# Patient Record
Sex: FEMALE | Race: WHITE | NOT HISPANIC OR LATINO | Employment: FULL TIME | URBAN - METROPOLITAN AREA
[De-identification: names, ages, dates, MRNs, and addresses within clinical notes are randomized per-mention and may not be internally consistent; named-entity substitution may affect disease eponyms.]

---

## 2017-05-30 ENCOUNTER — TELEPHONE (OUTPATIENT)
Dept: INTERNAL MEDICINE | Facility: CLINIC | Age: 32
End: 2017-05-30

## 2017-05-30 NOTE — TELEPHONE ENCOUNTER
----- Message from Melody Aviles MA sent at 5/29/2017  9:57 AM CDT -----  Regarding: appointment needed  Christie Montes asked me to help get her an appointment for her annual physical. Please call her at [phone] to schedule an appointment.     Thanks,  Melody Aviles MA  [facility]

## 2017-06-13 ENCOUNTER — TELEPHONE (OUTPATIENT)
Dept: INTERNAL MEDICINE | Facility: CLINIC | Age: 32
End: 2017-06-13

## 2017-06-13 NOTE — TELEPHONE ENCOUNTER
Left message for patient on the only phone number on file to call office.  Patient has to R/S appt on 6-14-17 due to Dr. Berman being out of the office.

## 2017-06-20 ENCOUNTER — OFFICE VISIT (OUTPATIENT)
Dept: OPTOMETRY | Facility: CLINIC | Age: 32
End: 2017-06-20
Payer: COMMERCIAL

## 2017-06-20 ENCOUNTER — TELEPHONE (OUTPATIENT)
Dept: INTERNAL MEDICINE | Facility: CLINIC | Age: 32
End: 2017-06-20

## 2017-06-20 DIAGNOSIS — Z46.0 FITTING AND ADJUSTMENT OF SPECTACLES AND CONTACT LENSES: Primary | ICD-10-CM

## 2017-06-20 DIAGNOSIS — H31.003 CHORIORETINAL SCAR, BILATERAL: ICD-10-CM

## 2017-06-20 DIAGNOSIS — H52.13 MYOPIA, BILATERAL: Primary | ICD-10-CM

## 2017-06-20 PROCEDURE — 99999 PR PBB SHADOW E&M-EST. PATIENT-LVL II: CPT | Mod: PBBFAC,,, | Performed by: OPTOMETRIST

## 2017-06-20 PROCEDURE — 92015 DETERMINE REFRACTIVE STATE: CPT | Mod: S$GLB,,, | Performed by: OPTOMETRIST

## 2017-06-20 PROCEDURE — 92014 COMPRE OPH EXAM EST PT 1/>: CPT | Mod: S$GLB,,, | Performed by: OPTOMETRIST

## 2017-06-20 PROCEDURE — 92310 CONTACT LENS FITTING OU: CPT | Mod: ,,, | Performed by: OPTOMETRIST

## 2017-06-20 NOTE — PROGRESS NOTES
HPI     Patient's age: 32 y.o.    Approximate date of last eye examination:  9/18/14  Name of last eye doctor seen: Dr. Esparza    Pt states that she is here for routine exam and contacts, not having any   trouble with eyes or contacts.    Wears glasses? Yes, left at home     Wears CLs?:  yes           If yes:              Type of CL worn:  [employer]              Wears full-time or part-time:  Full-time               Sleeps with contact lenses:  no                Headaches?  no  Eye pain/discomfort?  no                                                                                     Flashes?  no  Floaters?  no  Diplopia/Double vision?  no    Patient's Ocular History:         Any eye surgeries? no         Any eye injury?  no         Any treatment for eye disease?  no  Family history of eye disease?  no    Significant patient medical history:         1. Diabetes?  no       If yes, IDDM or NIDDM? no   2. HBP?  no                ! OTC eyedrops currently using:  none   ! Prescription eye meds currently using:  none        Last edited by Katrina Dawn MA on 6/20/2017  8:29 AM. (History)            Assessment /Plan     For exam results, see Encounter Report.    Myopia, bilateral    Chorioretinal scar, bilateral      Rx specs  CL fit today: good fit, vision and comfort OU   Changed Power OU   OK to order if happy     Discussed RD precautions and discussed yearly DFE to monitor    RTC 1 year

## 2017-06-20 NOTE — TELEPHONE ENCOUNTER
----- Message from Lisa Sullivan sent at 6/14/2017  7:49 AM CDT -----  Contact: Self/[phone] cell   Pt was returning a call to reschedule her appointment I offered pt an appointment for the next available she stated that date is too far out and she has already waited 3 weeks to be seen pt would like to know if she can be seen sooner. Please call and advise            Thank you

## 2017-07-06 ENCOUNTER — OFFICE VISIT (OUTPATIENT)
Dept: INTERNAL MEDICINE | Facility: CLINIC | Age: 32
End: 2017-07-06
Attending: FAMILY MEDICINE
Payer: COMMERCIAL

## 2017-07-06 ENCOUNTER — LAB VISIT (OUTPATIENT)
Dept: LAB | Facility: HOSPITAL | Age: 32
End: 2017-07-06
Attending: FAMILY MEDICINE
Payer: COMMERCIAL

## 2017-07-06 VITALS
OXYGEN SATURATION: 99 % | DIASTOLIC BLOOD PRESSURE: 60 MMHG | SYSTOLIC BLOOD PRESSURE: 90 MMHG | HEIGHT: 66 IN | HEART RATE: 67 BPM | WEIGHT: 161.19 LBS | BODY MASS INDEX: 25.9 KG/M2

## 2017-07-06 DIAGNOSIS — Z00.00 ANNUAL PHYSICAL EXAM: Primary | ICD-10-CM

## 2017-07-06 DIAGNOSIS — Z00.00 ANNUAL PHYSICAL EXAM: ICD-10-CM

## 2017-07-06 DIAGNOSIS — E01.0 THYROMEGALY: ICD-10-CM

## 2017-07-06 LAB
ANION GAP SERPL CALC-SCNC: 6 MMOL/L
BUN SERPL-MCNC: 8 MG/DL
CALCIUM SERPL-MCNC: 9.2 MG/DL
CHLORIDE SERPL-SCNC: 105 MMOL/L
CHOLEST/HDLC SERPL: 3.4 {RATIO}
CO2 SERPL-SCNC: 29 MMOL/L
CREAT SERPL-MCNC: 0.8 MG/DL
EST. GFR  (AFRICAN AMERICAN): >60 ML/MIN/1.73 M^2
EST. GFR  (NON AFRICAN AMERICAN): >60 ML/MIN/1.73 M^2
GLUCOSE SERPL-MCNC: 90 MG/DL
HDL/CHOLESTEROL RATIO: 29.7 %
HDLC SERPL-MCNC: 209 MG/DL
HDLC SERPL-MCNC: 62 MG/DL
LDLC SERPL CALC-MCNC: 137 MG/DL
NONHDLC SERPL-MCNC: 147 MG/DL
POTASSIUM SERPL-SCNC: 4.3 MMOL/L
SODIUM SERPL-SCNC: 140 MMOL/L
TRIGL SERPL-MCNC: 50 MG/DL

## 2017-07-06 PROCEDURE — 36415 COLL VENOUS BLD VENIPUNCTURE: CPT

## 2017-07-06 PROCEDURE — 99395 PREV VISIT EST AGE 18-39: CPT | Mod: S$GLB,,, | Performed by: FAMILY MEDICINE

## 2017-07-06 PROCEDURE — 99999 PR PBB SHADOW E&M-EST. PATIENT-LVL III: CPT | Mod: PBBFAC,,, | Performed by: FAMILY MEDICINE

## 2017-07-06 PROCEDURE — 80048 BASIC METABOLIC PNL TOTAL CA: CPT

## 2017-07-06 PROCEDURE — 80061 LIPID PANEL: CPT

## 2017-07-06 NOTE — PROGRESS NOTES
Subjective:       Patient ID: Christie Bay is a 32 y.o. female.    Chief Complaint: Annual Exam    Established patient for an annual wellness check/physical exam. No specific complaints, please see ROS.        Review of Systems   Constitutional: Negative for activity change, chills, fatigue, fever and unexpected weight change.   HENT: Negative for congestion, hearing loss, rhinorrhea and trouble swallowing.    Eyes: Negative for discharge, redness and visual disturbance.   Respiratory: Negative for cough, chest tightness, shortness of breath and wheezing.    Cardiovascular: Negative for chest pain, palpitations and leg swelling.   Gastrointestinal: Negative for abdominal pain, blood in stool, constipation, diarrhea and vomiting.   Endocrine: Negative for polydipsia and polyuria.   Genitourinary: Negative for difficulty urinating, dysuria, hematuria and menstrual problem.   Musculoskeletal: Negative for arthralgias, back pain, gait problem, joint swelling, myalgias and neck pain.   Skin: Negative for color change and rash.   Neurological: Positive for headaches. Negative for tremors, speech difficulty, weakness and numbness.   Hematological: Negative for adenopathy. Does not bruise/bleed easily.   Psychiatric/Behavioral: Negative for behavioral problems, confusion, dysphoric mood and sleep disturbance. The patient is not nervous/anxious.        Objective:      Physical Exam   Constitutional: She is oriented to person, place, and time. She appears well-developed and well-nourished.   Eyes: No scleral icterus.   Neck: Normal range of motion. Neck supple. No JVD present. No tracheal deviation present. No thyromegaly present.   Cardiovascular: Normal rate, normal heart sounds and intact distal pulses.  Exam reveals no gallop and no friction rub.    No murmur heard.  Pulmonary/Chest: Effort normal and breath sounds normal. No respiratory distress. She has no wheezes. She has no rales.   Abdominal: Soft.  Bowel sounds are normal. She exhibits no distension, no abdominal bruit and no mass. There is no tenderness. There is no rebound and no guarding.   Musculoskeletal: She exhibits no edema.   Lymphadenopathy:     She has no cervical adenopathy.   Neurological: She is alert and oriented to person, place, and time. She displays no tremor. No cranial nerve deficit. Coordination and gait normal.   Skin: Skin is warm and dry. No rash noted. She is not diaphoretic. No erythema.   Psychiatric: She has a normal mood and affect. Her behavior is normal. Judgment and thought content normal.   Nursing note and vitals reviewed.      Assessment:       1. Annual physical exam    2. Thyromegaly        Plan:   Christie was seen today for annual exam.    Diagnoses and all orders for this visit:    Annual physical exam  -     Basic metabolic panel; Future  -     Lipid panel; Future    Thyromegaly  -     US Soft Tissue Head Neck Thyroid; Future      See meds, orders, follow up, routing and instructions sections of encounter.  Old labs reviewed.

## 2017-07-07 ENCOUNTER — OFFICE VISIT (OUTPATIENT)
Dept: OBSTETRICS AND GYNECOLOGY | Facility: CLINIC | Age: 32
End: 2017-07-07
Payer: COMMERCIAL

## 2017-07-07 VITALS
SYSTOLIC BLOOD PRESSURE: 106 MMHG | HEIGHT: 66 IN | WEIGHT: 160.25 LBS | BODY MASS INDEX: 25.75 KG/M2 | DIASTOLIC BLOOD PRESSURE: 74 MMHG

## 2017-07-07 DIAGNOSIS — Z01.419 ENCOUNTER FOR GYNECOLOGICAL EXAMINATION WITHOUT ABNORMAL FINDING: Primary | ICD-10-CM

## 2017-07-07 DIAGNOSIS — Z31.9 PROCREATIVE MANAGEMENT: ICD-10-CM

## 2017-07-07 PROCEDURE — 88175 CYTOPATH C/V AUTO FLUID REDO: CPT

## 2017-07-07 PROCEDURE — 99999 PR PBB SHADOW E&M-EST. PATIENT-LVL III: CPT | Mod: PBBFAC,,, | Performed by: OBSTETRICS & GYNECOLOGY

## 2017-07-07 PROCEDURE — 99385 PREV VISIT NEW AGE 18-39: CPT | Mod: S$GLB,,, | Performed by: OBSTETRICS & GYNECOLOGY

## 2017-07-07 NOTE — PROGRESS NOTES
"CC: Well woman exam    Christie Bay is a 32 y.o. female  presents for a well woman exam.  She has no issues, problems, or complaints.    Past Medical History:   Diagnosis Date    History of shingles     Migraine headache     Ulcer        Past Surgical History:   Procedure Laterality Date    CHOLECYSTECTOMY      KNEE SURGERY Left     SHOULDER SURGERY Right        OB History    Para Term  AB Living   0 0 0 0 0 0   SAB TAB Ectopic Multiple Live Births   0 0 0 0               Family History   Problem Relation Age of Onset    Migraines Mother     Skin cancer Mother     Cancer Mother      skin    Hypertension Father     Diabetes Maternal Grandmother     Breast cancer Maternal Grandmother     Cancer Maternal Grandmother      breast    Brain cancer Paternal Grandfather     Cancer Paternal Grandfather      brain    Ovarian cancer Neg Hx     Colon cancer Neg Hx        Social History   Substance Use Topics    Smoking status: Never Smoker    Smokeless tobacco: Never Used    Alcohol use Yes      Comment: 2 per month.       /74   Ht 5' 6" (1.676 m)   Wt 72.7 kg (160 lb 4.4 oz)   LMP 2017 (Exact Date)   BMI 25.87 kg/m²     ROS:  GENERAL: Denies weight gain or weight loss. Feeling well overall.   SKIN: Denies rash or lesions.   HEAD: Denies head injury or headache.   NODES: Denies enlarged lymph nodes.   CHEST: Denies chest pain or shortness of breath.   CARDIOVASCULAR: Denies palpitations or left sided chest pain.   ABDOMEN: No abdominal pain, constipation, diarrhea, nausea, vomiting or rectal bleeding.   URINARY: No frequency, dysuria, hematuria, or burning on urination.  REPRODUCTIVE: See HPI.   BREASTS: The patient performs breast self-examination and denies pain, lumps, or nipple discharge.   HEMATOLOGIC: No easy bruisability or excessive bleeding.  MUSCULOSKELETAL: Denies joint pain or swelling.   NEUROLOGIC: Denies syncope or weakness.   PSYCHIATRIC: " Denies depression, anxiety or mood swings.    Physical Exam:    APPEARANCE: Well nourished, well developed, in no acute distress.  AFFECT: WNL, alert and oriented x 3  SKIN: No acne or hirsutism  NECK: Neck symmetric without masses or thyromegaly  NODES: No inguinal, cervical, axillary, or femoral lymph node enlargement  CHEST: Good respiratory effect  ABDOMEN: Soft.  No tenderness or masses.  No hepatosplenomegaly.  No hernias.  BREASTS: Symmetrical, no skin changes or visible lesions.  No palpable masses, nipple discharge bilaterally.  PELVIC: Normal external genitalia without lesions.  Normal hair distribution.  Adequate perineal body, normal urethral meatus.  Vagina moist and well rugated without lesions or discharge.  Cervix pink, without lesions, discharge or tenderness.  No significant cystocele or rectocele.  Bimanual exam shows uterus to be normal size, regular, mobile and nontender.  Adnexa without masses or tenderness.    EXTREMITIES: No edema.    ASSESSMENT AND PLAN  1. Encounter for gynecological examination without abnormal finding  Liquid-based pap smear, screening   2. Procreative management       PNV daily  Considering future fertility with Dr Manning  Patient was counseled today on A.C.S. Pap guidelines and recommendations for yearly pelvic exams, mammograms and monthly self breast exams; to see her PCP for other health maintenance.     F/U PRN

## 2017-08-01 ENCOUNTER — OFFICE VISIT (OUTPATIENT)
Dept: DERMATOLOGY | Facility: CLINIC | Age: 32
End: 2017-08-01
Payer: COMMERCIAL

## 2017-08-01 DIAGNOSIS — L81.4 LENTIGINES: ICD-10-CM

## 2017-08-01 DIAGNOSIS — D22.9 BENIGN NEVUS: ICD-10-CM

## 2017-08-01 DIAGNOSIS — L70.0 ACNE VULGARIS: ICD-10-CM

## 2017-08-01 DIAGNOSIS — D18.00 ANGIOMA: Primary | ICD-10-CM

## 2017-08-01 PROCEDURE — 99999 PR PBB SHADOW E&M-EST. PATIENT-LVL II: CPT | Mod: PBBFAC,,, | Performed by: DERMATOLOGY

## 2017-08-01 PROCEDURE — 99202 OFFICE O/P NEW SF 15 MIN: CPT | Mod: S$GLB,,, | Performed by: DERMATOLOGY

## 2017-08-01 NOTE — PROGRESS NOTES
"  Subjective:       Patient ID:  Christie Bay is a 32 y.o. female who presents for   Chief Complaint   Patient presents with    Skin Check     UBSE, no concerns     Ms. Bay is a 33 yo woman here as new patient for UBSE. She has never had a skin cancer or had any moles removed. She says her mom had a "pre-cancer" on her nose that was treated and resolved. Denies any dark/changing/painful/bleeding lesions. She is an  and spends a lot of time outdoors. She got a sunburn on her shoulders last week because she forgot to re-apply sunscreen.       Past Medical History:   Diagnosis Date    History of shingles     Migraine headache     Ulcer        Review of Systems   Constitutional: Negative for fever, chills, fatigue and malaise.   Skin: Positive for activity-related sunscreen use and recent sunburn. Negative for daily sunscreen use.   Hematologic/Lymphatic: Bruises/bleeds easily.        Objective:    Physical Exam   Constitutional: She appears well-developed and well-nourished. No distress.   Neurological: She is alert and oriented to person, place, and time. She is not disoriented.   Psychiatric: She has a normal mood and affect.   Skin:   Areas Examined (abnormalities noted in diagram):   Head / Face Inspection Performed  Neck Inspection Performed  Chest / Axilla Inspection Performed  Abdomen Inspection Performed  Back Inspection Performed  RUE Inspected  LUE Inspection Performed  RLE Inspected  LLE Inspection Performed              Diagram Legend     Erythematous scaling macule/papule c/w actinic keratosis       Vascular papule c/w angioma      Pigmented verrucoid papule/plaque c/w seborrheic keratosis      Yellow umbilicated papule c/w sebaceous hyperplasia      Irregularly shaped tan macule c/w lentigo     1-2 mm smooth white papules consistent with Milia      Movable subcutaneous cyst with punctum c/w epidermal inclusion cyst      Subcutaneous movable cyst c/w pilar cyst      " Firm pink to brown papule c/w dermatofibroma      Pedunculated fleshy papule(s) c/w skin tag(s)      Evenly pigmented macule c/w junctional nevus     Mildly variegated pigmented, slightly irregular-bordered macule c/w mildly atypical nevus      Flesh colored to evenly pigmented papule c/w intradermal nevus       Pink pearly papule/plaque c/w basal cell carcinoma      Erythematous hyperkeratotic cursted plaque c/w SCC      Surgical scar with no sign of skin cancer recurrence      Open and closed comedones      Inflammatory papules and pustules      Verrucoid papule consistent consistent with wart     Erythematous eczematous patches and plaques     Dystrophic onycholytic nail with subungual debris c/w onychomycosis     Umbilicated papule    Erythematous-base heme-crusted tan verrucoid plaque consistent with inflamed seborrheic keratosis     Erythematous Silvery Scaling Plaque c/w Psoriasis     See annotation      Assessment / Plan:        1) Solar lentigines:  - Noted on bilateral shoulders.  - Discussed that these are a marker of chronic sun exposure.  - Advised judicious sun protection with re-application of sunscreen every 2 hours.     2) Benign Nevus  Reassurance    3) Angioma  This is a benign vascular lesion. Reassurance given. No treatment required.     4) Acne  Asymptomatic.      RTC annually for TBSE

## 2017-08-01 NOTE — LETTER
August 1, 2017      Gera Berman MD  [address]  [place] [address]           [facility]  [address]  [place] [address]  Phone: [phone]  Fax: [phone]          Patient: Christie Bay   MR Number: [id number]   YOB: 1985   Date of Visit: 8/1/2017       Dear Dr. Gera Berman:    Thank you for referring Christie Bay to me for evaluation. Attached you will find relevant portions of my assessment and plan of care.    If you have questions, please do not hesitate to call me. I look forward to following Christie Bay along with you.    Sincerely,    Paradise Donovan MD    Enclosure  CC:  No Recipients    If you would like to receive this communication electronically, please contact [email or web address] or [phone] to request more information on [employer] access.    For providers and/or their staff who would like to refer a patient to [facility], please contact us through our one-stop-shop provider referral line, [facility], at [phone].    If you feel you have received this communication in error or would no longer like to receive these types of communications, please e-mail [email or web address]

## 2018-04-26 ENCOUNTER — OFFICE VISIT (OUTPATIENT)
Dept: URGENT CARE | Facility: CLINIC | Age: 33
End: 2018-04-26
Payer: COMMERCIAL

## 2018-04-26 VITALS
DIASTOLIC BLOOD PRESSURE: 78 MMHG | WEIGHT: 147 LBS | HEIGHT: 66 IN | HEART RATE: 65 BPM | OXYGEN SATURATION: 100 % | SYSTOLIC BLOOD PRESSURE: 125 MMHG | RESPIRATION RATE: 18 BRPM | TEMPERATURE: 99 F | BODY MASS INDEX: 23.63 KG/M2

## 2018-04-26 DIAGNOSIS — J06.9 URI WITH COUGH AND CONGESTION: Primary | ICD-10-CM

## 2018-04-26 DIAGNOSIS — J02.9 SORE THROAT: ICD-10-CM

## 2018-04-26 LAB
CTP QC/QA: YES
CTP QC/QA: YES
FLUAV AG NPH QL: NEGATIVE
FLUBV AG NPH QL: NEGATIVE
S PYO RRNA THROAT QL PROBE: NEGATIVE

## 2018-04-26 PROCEDURE — 87804 INFLUENZA ASSAY W/OPTIC: CPT | Mod: QW,S$GLB,, | Performed by: NURSE PRACTITIONER

## 2018-04-26 PROCEDURE — 87880 STREP A ASSAY W/OPTIC: CPT | Mod: QW,S$GLB,, | Performed by: NURSE PRACTITIONER

## 2018-04-26 PROCEDURE — 96372 THER/PROPH/DIAG INJ SC/IM: CPT | Mod: S$GLB,,, | Performed by: EMERGENCY MEDICINE

## 2018-04-26 PROCEDURE — 99214 OFFICE O/P EST MOD 30 MIN: CPT | Mod: 25,S$GLB,, | Performed by: NURSE PRACTITIONER

## 2018-04-26 RX ORDER — AZITHROMYCIN 250 MG/1
TABLET, FILM COATED ORAL
Qty: 6 TABLET | Refills: 0 | Status: SHIPPED | OUTPATIENT
Start: 2018-04-26 | End: 2019-07-31 | Stop reason: ALTCHOICE

## 2018-04-26 RX ORDER — BETAMETHASONE SODIUM PHOSPHATE AND BETAMETHASONE ACETATE 3; 3 MG/ML; MG/ML
6 INJECTION, SUSPENSION INTRA-ARTICULAR; INTRALESIONAL; INTRAMUSCULAR; SOFT TISSUE
Status: COMPLETED | OUTPATIENT
Start: 2018-04-26 | End: 2018-04-26

## 2018-04-26 RX ORDER — PROMETHAZINE HYDROCHLORIDE AND DEXTROMETHORPHAN HYDROBROMIDE 6.25; 15 MG/5ML; MG/5ML
5 SYRUP ORAL NIGHTLY PRN
Qty: 90 ML | Refills: 0 | Status: SHIPPED | OUTPATIENT
Start: 2018-04-26 | End: 2018-05-06

## 2018-04-26 RX ORDER — FLUTICASONE PROPIONATE 50 MCG
2 SPRAY, SUSPENSION (ML) NASAL DAILY
Qty: 1 BOTTLE | Refills: 0 | Status: SHIPPED | OUTPATIENT
Start: 2018-04-26 | End: 2019-07-31 | Stop reason: ALTCHOICE

## 2018-04-26 RX ADMIN — BETAMETHASONE SODIUM PHOSPHATE AND BETAMETHASONE ACETATE 6 MG: 3; 3 INJECTION, SUSPENSION INTRA-ARTICULAR; INTRALESIONAL; INTRAMUSCULAR; SOFT TISSUE at 01:04

## 2018-04-26 NOTE — PATIENT INSTRUCTIONS
YOU HAVE BEEN GIVEN A PRESCRIPTION FOR ANTIBIOTICS. IT WAS ADVISED TO DELAY THE COURSE OF ANTIBIOTICS FOR 3-5 DAYS IF SYMPTOMS DO NOT RESOLVE. IT IMPORTANT TO FOLLOW THESE INSTRUCTIONS AS ANTIBIOTIC RESISTANCE IS HIGH. YOUR SYMPTOMS WILL LIKELY RESOLVE WITHOUT THIS PRESCRIPTION.         Please drink plenty of fluids.  Please get plenty of rest.  Please return here or go to the Emergency Department for any concerns or worsening of condition.  If you were prescribed antibiotics, please take them to completion.  If you were given wait & see antibiotics, please wait 5-7 days before taking them, and only take them if your symptoms have worsened or not improved.  If you do begin taking the antibiotics, please take them to completion.  If you were prescribed a narcotic medication, do not drive or operate heavy equipment or machinery while taking these medications.  If you were given a steroid shot in the clinic and have also been given a prescription for a steroid such as Prednisone or a Medrol Dose Pack, please begin taking them tomorrow.  If you do not have Hypertension or any history of palpitations, it is ok to take over the counter Sudafed or Mucinex D or Allegra-D or Claritin-D or Zyrtec-D.  If you do take one of the above, it is ok to combine that with plain over the counter Mucinex or Allegra or Claritin or Zyrtec.  If for example you are taking Zyrtec -D, you can combine that with Mucinex, but not Mucinex-D.  If you are taking Mucinex-D, you can combine that with plain Allegra or Claritin or Zyrtec.   If you do have Hypertension or palpitations, it is safe to take Coricidin HBP for relief of sinus symptoms.  If not allergic, please take over the counter Tylenol (Acetaminophen) and/or Motrin (Ibuprofen) as directed for control of pain and/or fever.  Please follow up with your primary care doctor or specialist as needed.    If you  smoke, please stop smoking.      Viral Upper Respiratory Illness (Adult)  You have a  viral upper respiratory illness (URI), which is another term for the common cold. This illness is contagious during the first few days. It is spread through the air by coughing and sneezing. It may also be spread by direct contact (touching the sick person and then touching your own eyes, nose, or mouth). Frequent handwashing will decrease risk of spread. Most viral illnesses go away within 7 to 10 days with rest and simple home remedies. Sometimes the illness may last for several weeks. Antibiotics will not kill a virus, and they are generally not prescribed for this condition.    Home care  · If symptoms are severe, rest at home for the first 2 to 3 days. When you resume activity, don't let yourself get too tired.  · Avoid being exposed to cigarette smoke (yours or others).  · You may use acetaminophen or ibuprofen to control pain and fever, unless another medicine was prescribed. (Note: If you have chronic liver or kidney disease, have ever had a stomach ulcer or gastrointestinal bleeding, or are taking blood-thinning medicines, talk with your healthcare provider before using these medicines.) Aspirin should never be given to anyone under 18 years of age who is ill with a viral infection or fever. It may cause severe liver or brain damage.  · Your appetite may be poor, so a light diet is fine. Avoid dehydration by drinking 6 to 8 glasses of fluids per day (water, soft drinks, juices, tea, or soup). Extra fluids will help loosen secretions in the nose and lungs.  · Over-the-counter cold medicines will not shorten the length of time youre sick, but they may be helpful for the following symptoms: cough, sore throat, and nasal and sinus congestion. (Note: Do not use decongestants if you have high blood pressure.)  Follow-up care  Follow up with your healthcare provider, or as advised.  When to seek medical advice  Call your healthcare provider right away if any of these occur:  · Cough with lots of colored sputum  (mucus)  · Severe headache; face, neck, or ear pain  · Difficulty swallowing due to throat pain  · Fever of 100.4°F (38°C)  Call 911, or get immediate medical care  Call emergency services right away if any of these occur:  · Chest pain, shortness of breath, wheezing, or difficulty breathing  · Coughing up blood  · Inability to swallow due to throat pain  Date Last Reviewed: 9/13/2015  © 6252-9168 [employer]. [address] [place] [address], [place] [address]. All rights reserved. This information is not intended as a substitute for professional medical care. Always follow your healthcare professional's instructions.        Self-Care for Sore Throats    Sore throats happen for many reasons, such as colds, allergies, and infections caused by viruses or bacteria. In any case, your throat becomes red and sore. Your goal for self-care is to reduce your discomfort while giving your throat a chance to heal.  Moisten and soothe your throat  Tips include the following:  · Try a sip of water first thing after waking up.  · Keep your throat moist by drinking 6 or more glasses of clear liquids every day.  · Run a cool-air humidifier in your room overnight.  · Avoid cigarette smoke.   · Suck on throat lozenges, cough drops, hard candy, ice chips, or frozen fruit-juice bars. Use the sugar-free versions if your diet or medical condition requires them.  Gargle to ease irritation  Gargling every hour or 2 can ease irritation. Try gargling with 1 of these solutions:  · 1/4 teaspoon of salt in 1/2 cup of warm water  · An over-the-counter anesthetic gargle  Use medicine for more relief  Over-the-counter medicine can reduce sore throat symptoms. Ask your pharmacist if you have questions about which medicine to use:  · Ease pain with anesthetic sprays. Aspirin or an aspirin substitute also helps. Remember, never give aspirin to anyone 18 or younger, or if you are already taking blood thinners.   · For sore throats caused by  allergies, try antihistamines to block the allergic reaction.  · Remember: unless a sore throat is caused by a bacterial infection, antibiotics wont help you.  Prevent future sore throats  Prevention tips include the following:  · Stop smoking or reduce contact with secondhand smoke. Smoke irritates the tender throat lining.  · Limit contact with pets and with allergy-causing substances, such as pollen and mold.  · When youre around someone with a sore throat or cold, wash your hands often to keep viruses or bacteria from spreading.  · Dont strain your vocal cords.  Call your healthcare provider  Contact your healthcare provider if you have:  · A temperature over 101°F (38.3°C)  · White spots on the throat  · Great difficulty swallowing  · Trouble breathing  · A skin rash  · Recent exposure to someone else with strep bacteria  · Severe hoarseness and swollen glands in the neck or jaw   Date Last Reviewed: 8/1/2016  © 2152-6098 [employer]. [address] [place] [address], [place] [address]. All rights reserved. This information is not intended as a substitute for professional medical care. Always follow your healthcare professional's instructions.

## 2018-04-26 NOTE — PROGRESS NOTES
"Subjective:       Patient ID: Christie Bay is a 33 y.o. female.    Vitals:  height is 5' 6" (1.676 m) and weight is 66.7 kg (147 lb). Her temperature is 98.6 °F (37 °C). Her blood pressure is 125/78 and her pulse is 65. Her respiration is 18 and oxygen saturation is 100%.     Chief Complaint: Sinus Problem    Reports fever os 101.4F the other night.   S/s- URI with cough and congestion. + nasal congestion.           Sinus Problem   This is a new problem. Episode onset: 3 Days. The problem has been gradually worsening since onset. There has been no fever. Her pain is at a severity of 3/10. The pain is mild. Associated symptoms include congestion, coughing, shortness of breath and a sore throat. Pertinent negatives include no chills, ear pain, headaches or hoarse voice. Past treatments include oral decongestants. The treatment provided mild relief.     Review of Systems   Constitution: Positive for malaise/fatigue. Negative for chills and fever.   HENT: Positive for congestion and sore throat. Negative for ear pain and hoarse voice.    Eyes: Negative for discharge and redness.   Cardiovascular: Negative for chest pain, dyspnea on exertion and leg swelling.   Respiratory: Positive for cough, shortness of breath and sputum production. Negative for wheezing.    Musculoskeletal: Negative for myalgias.   Gastrointestinal: Negative for abdominal pain and nausea.   Neurological: Negative for headaches.       Objective:      Physical Exam   Constitutional: She is oriented to person, place, and time. She appears well-developed and well-nourished. She is cooperative.  Non-toxic appearance. She does not appear ill. No distress.   Afebrile   Constantly clearing throat during exam  Nonproductive cough    HENT:   Head: Normocephalic and atraumatic.   Right Ear: Hearing, tympanic membrane, external ear and ear canal normal.   Left Ear: Hearing, tympanic membrane, external ear and ear canal normal.   Nose: Mucosal " edema and rhinorrhea present. No nasal deformity. No epistaxis. Right sinus exhibits maxillary sinus tenderness (very mild). Right sinus exhibits no frontal sinus tenderness. Left sinus exhibits maxillary sinus tenderness (very mild). Left sinus exhibits no frontal sinus tenderness.   Mouth/Throat: Uvula is midline and mucous membranes are normal. No trismus in the jaw. Normal dentition. No uvula swelling. Posterior oropharyngeal edema and posterior oropharyngeal erythema present. No oropharyngeal exudate. Tonsils are 1+ on the left. No tonsillar exudate.   Eyes: Conjunctivae and lids are normal. No scleral icterus.   Sclera clear bilat   Neck: Trachea normal, normal range of motion, full passive range of motion without pain and phonation normal. Neck supple.   Cardiovascular: Normal rate, regular rhythm, normal heart sounds, intact distal pulses and normal pulses.    Pulmonary/Chest: Effort normal and breath sounds normal. No respiratory distress.   Abdominal: Soft. Normal appearance and bowel sounds are normal. She exhibits no distension. There is no tenderness.   Musculoskeletal: Normal range of motion. She exhibits no edema or deformity.   Lymphadenopathy:        Head (right side): Tonsillar adenopathy present.        Head (left side): Tonsillar adenopathy present.     She has cervical adenopathy.   Neurological: She is alert and oriented to person, place, and time. She exhibits normal muscle tone. Coordination normal.   Skin: Skin is warm, dry and intact. She is not diaphoretic. No pallor.   Psychiatric: She has a normal mood and affect. Her speech is normal and behavior is normal. Judgment and thought content normal. Cognition and memory are normal.   Nursing note and vitals reviewed.         Results for orders placed or performed in visit on 04/26/18   POCT Influenza A/B   Result Value Ref Range    Rapid Influenza A Ag Negative Negative    Rapid Influenza B Ag Negative Negative     Acceptable  Yes    POCT rapid strep A   Result Value Ref Range    Rapid Strep A Screen Negative Negative     Acceptable Yes        Assessment:       1. URI with cough and congestion    2. Sore throat        Plan:         URI with cough and congestion  -     betamethasone acetate-betamethasone sodium phosphate injection 6 mg; Inject 1 mL (6 mg total) into the muscle one time.  -     promethazine-dextromethorphan (PROMETHAZINE-DM) 6.25-15 mg/5 mL Syrp; Take 5 mLs by mouth nightly as needed.  Dispense: 90 mL; Refill: 0  -     azithromycin (ZITHROMAX Z-SONIA) 250 MG tablet; Take 2 tablets (500 mg) on  Day 1,  followed by 1 tablet (250 mg) once daily on Days 2 through 5.  Dispense: 6 tablet; Refill: 0  -     fluticasone (FLONASE) 50 mcg/actuation nasal spray; 2 sprays (100 mcg total) by Each Nare route once daily.  Dispense: 1 Bottle; Refill: 0    Sore throat  -     POCT Influenza A/B  -     POCT rapid strep A      Patient Instructions     YOU HAVE BEEN GIVEN A PRESCRIPTION FOR ANTIBIOTICS. IT WAS ADVISED TO DELAY THE COURSE OF ANTIBIOTICS FOR 3-5 DAYS IF SYMPTOMS DO NOT RESOLVE. IT IMPORTANT TO FOLLOW THESE INSTRUCTIONS AS ANTIBIOTIC RESISTANCE IS HIGH. YOUR SYMPTOMS WILL LIKELY RESOLVE WITHOUT THIS PRESCRIPTION.         Please drink plenty of fluids.  Please get plenty of rest.  Please return here or go to the Emergency Department for any concerns or worsening of condition.  If you were prescribed antibiotics, please take them to completion.  If you were given wait & see antibiotics, please wait 5-7 days before taking them, and only take them if your symptoms have worsened or not improved.  If you do begin taking the antibiotics, please take them to completion.  If you were prescribed a narcotic medication, do not drive or operate heavy equipment or machinery while taking these medications.  If you were given a steroid shot in the clinic and have also been given a prescription for a steroid such as Prednisone or a  Medrol Dose Pack, please begin taking them tomorrow.  If you do not have Hypertension or any history of palpitations, it is ok to take over the counter Sudafed or Mucinex D or Allegra-D or Claritin-D or Zyrtec-D.  If you do take one of the above, it is ok to combine that with plain over the counter Mucinex or Allegra or Claritin or Zyrtec.  If for example you are taking Zyrtec -D, you can combine that with Mucinex, but not Mucinex-D.  If you are taking Mucinex-D, you can combine that with plain Allegra or Claritin or Zyrtec.   If you do have Hypertension or palpitations, it is safe to take Coricidin HBP for relief of sinus symptoms.  If not allergic, please take over the counter Tylenol (Acetaminophen) and/or Motrin (Ibuprofen) as directed for control of pain and/or fever.  Please follow up with your primary care doctor or specialist as needed.    If you  smoke, please stop smoking.      Viral Upper Respiratory Illness (Adult)  You have a viral upper respiratory illness (URI), which is another term for the common cold. This illness is contagious during the first few days. It is spread through the air by coughing and sneezing. It may also be spread by direct contact (touching the sick person and then touching your own eyes, nose, or mouth). Frequent handwashing will decrease risk of spread. Most viral illnesses go away within 7 to 10 days with rest and simple home remedies. Sometimes the illness may last for several weeks. Antibiotics will not kill a virus, and they are generally not prescribed for this condition.    Home care  · If symptoms are severe, rest at home for the first 2 to 3 days. When you resume activity, don't let yourself get too tired.  · Avoid being exposed to cigarette smoke (yours or others).  · You may use acetaminophen or ibuprofen to control pain and fever, unless another medicine was prescribed. (Note: If you have chronic liver or kidney disease, have ever had a stomach ulcer or  gastrointestinal bleeding, or are taking blood-thinning medicines, talk with your healthcare provider before using these medicines.) Aspirin should never be given to anyone under 18 years of age who is ill with a viral infection or fever. It may cause severe liver or brain damage.  · Your appetite may be poor, so a light diet is fine. Avoid dehydration by drinking 6 to 8 glasses of fluids per day (water, soft drinks, juices, tea, or soup). Extra fluids will help loosen secretions in the nose and lungs.  · Over-the-counter cold medicines will not shorten the length of time youre sick, but they may be helpful for the following symptoms: cough, sore throat, and nasal and sinus congestion. (Note: Do not use decongestants if you have high blood pressure.)  Follow-up care  Follow up with your healthcare provider, or as advised.  When to seek medical advice  Call your healthcare provider right away if any of these occur:  · Cough with lots of colored sputum (mucus)  · Severe headache; face, neck, or ear pain  · Difficulty swallowing due to throat pain  · Fever of 100.4°F (38°C)  Call 911, or get immediate medical care  Call emergency services right away if any of these occur:  · Chest pain, shortness of breath, wheezing, or difficulty breathing  · Coughing up blood  · Inability to swallow due to throat pain  Date Last Reviewed: 9/13/2015 © 2000-2017 [employer]. [address] [place] [address]. All rights reserved. This information is not intended as a substitute for professional medical care. Always follow your healthcare professional's instructions.        Self-Care for Sore Throats    Sore throats happen for many reasons, such as colds, allergies, and infections caused by viruses or bacteria. In any case, your throat becomes red and sore. Your goal for self-care is to reduce your discomfort while giving your throat a chance to heal.  Moisten and soothe your throat  Tips include the  following:  · Try a sip of water first thing after waking up.  · Keep your throat moist by drinking 6 or more glasses of clear liquids every day.  · Run a cool-air humidifier in your room overnight.  · Avoid cigarette smoke.   · Suck on throat lozenges, cough drops, hard candy, ice chips, or frozen fruit-juice bars. Use the sugar-free versions if your diet or medical condition requires them.  Gargle to ease irritation  Gargling every hour or 2 can ease irritation. Try gargling with 1 of these solutions:  · 1/4 teaspoon of salt in 1/2 cup of warm water  · An over-the-counter anesthetic gargle  Use medicine for more relief  Over-the-counter medicine can reduce sore throat symptoms. Ask your pharmacist if you have questions about which medicine to use:  · Ease pain with anesthetic sprays. Aspirin or an aspirin substitute also helps. Remember, never give aspirin to anyone 18 or younger, or if you are already taking blood thinners.   · For sore throats caused by allergies, try antihistamines to block the allergic reaction.  · Remember: unless a sore throat is caused by a bacterial infection, antibiotics wont help you.  Prevent future sore throats  Prevention tips include the following:  · Stop smoking or reduce contact with secondhand smoke. Smoke irritates the tender throat lining.  · Limit contact with pets and with allergy-causing substances, such as pollen and mold.  · When youre around someone with a sore throat or cold, wash your hands often to keep viruses or bacteria from spreading.  · Dont strain your vocal cords.  Call your healthcare provider  Contact your healthcare provider if you have:  · A temperature over 101°F (38.3°C)  · White spots on the throat  · Great difficulty swallowing  · Trouble breathing  · A skin rash  · Recent exposure to someone else with strep bacteria  · Severe hoarseness and swollen glands in the neck or jaw   Date Last Reviewed: 8/1/2016  © 6592-0313 [employer]. [address]  [place] [address]. All rights reserved. This information is not intended as a substitute for professional medical care. Always follow your healthcare professional's instructions.

## 2018-05-07 ENCOUNTER — OFFICE VISIT (OUTPATIENT)
Dept: URGENT CARE | Facility: CLINIC | Age: 33
End: 2018-05-07
Payer: COMMERCIAL

## 2018-05-07 VITALS
RESPIRATION RATE: 18 BRPM | OXYGEN SATURATION: 100 % | TEMPERATURE: 102 F | WEIGHT: 145 LBS | DIASTOLIC BLOOD PRESSURE: 72 MMHG | HEIGHT: 66 IN | SYSTOLIC BLOOD PRESSURE: 109 MMHG | BODY MASS INDEX: 23.3 KG/M2 | HEART RATE: 109 BPM

## 2018-05-07 DIAGNOSIS — A08.4 VIRAL GASTROENTERITIS: Primary | ICD-10-CM

## 2018-05-07 PROCEDURE — 99214 OFFICE O/P EST MOD 30 MIN: CPT | Mod: S$GLB,,, | Performed by: NURSE PRACTITIONER

## 2018-05-07 PROCEDURE — 3008F BODY MASS INDEX DOCD: CPT | Mod: CPTII,S$GLB,, | Performed by: NURSE PRACTITIONER

## 2018-05-07 RX ORDER — ONDANSETRON 4 MG/1
4 TABLET, ORALLY DISINTEGRATING ORAL EVERY 6 HOURS PRN
Qty: 20 TABLET | Refills: 0 | Status: SHIPPED | OUTPATIENT
Start: 2018-05-07 | End: 2019-07-31 | Stop reason: ALTCHOICE

## 2018-05-07 RX ORDER — ACETAMINOPHEN 325 MG/1
650 TABLET ORAL
Status: COMPLETED | OUTPATIENT
Start: 2018-05-07 | End: 2018-05-07

## 2018-05-07 RX ORDER — ONDANSETRON 4 MG/1
4 TABLET, ORALLY DISINTEGRATING ORAL
Status: COMPLETED | OUTPATIENT
Start: 2018-05-07 | End: 2018-05-07

## 2018-05-07 RX ADMIN — ACETAMINOPHEN 650 MG: 325 TABLET ORAL at 01:05

## 2018-05-07 RX ADMIN — ONDANSETRON 4 MG: 4 TABLET, ORALLY DISINTEGRATING ORAL at 01:05

## 2018-05-07 NOTE — PROGRESS NOTES
"Subjective:       Patient ID: Christie Bay is a 33 y.o. female.    Vitals:  height is 5' 6" (1.676 m) and weight is 65.8 kg (145 lb). Her temperature is 102 °F (38.9 °C) (abnormal). Her blood pressure is 109/72 and her pulse is 109. Her respiration is 18 and oxygen saturation is 100%.     Chief Complaint: Nausea and Back Pain    Pt states that she woke up at 4am today with all her symptoms      Nausea   This is a new problem. The current episode started today. The problem occurs constantly. The problem has been gradually worsening. Associated symptoms include a change in bowel habit, chills, fatigue, a fever, headaches, myalgias, nausea and vomiting. Pertinent negatives include no abdominal pain, chest pain, numbness or rash. The symptoms are aggravated by drinking. She has tried drinking for the symptoms. The treatment provided no relief.   Back Pain   This is a new problem. The current episode started today. The problem occurs constantly. The problem has been gradually worsening since onset. The pain is present in the lumbar spine. The quality of the pain is described as aching. The pain is moderate. The symptoms are aggravated by twisting and standing. Associated symptoms include a fever and headaches. Pertinent negatives include no abdominal pain, bladder incontinence, bowel incontinence, chest pain, dysuria or numbness. She has tried nothing for the symptoms.     Review of Systems   Constitution: Positive for chills, fatigue, fever and malaise/fatigue.   Cardiovascular: Negative for chest pain.   Respiratory: Negative for shortness of breath.    Skin: Negative for color change and rash.   Musculoskeletal: Positive for back pain and myalgias. Negative for muscle cramps, muscle weakness and stiffness.   Gastrointestinal: Positive for change in bowel habit, diarrhea, nausea and vomiting. Negative for abdominal pain, bowel incontinence, constipation, hematochezia and melena.   Genitourinary: " Negative for bladder incontinence, dysuria, hematuria and urgency.   Neurological: Positive for headaches. Negative for disturbances in coordination and numbness.       Objective:      Physical Exam   Constitutional: She is oriented to person, place, and time. She appears well-developed and well-nourished.   HENT:   Head: Normocephalic and atraumatic.   Right Ear: External ear normal.   Left Ear: External ear normal.   Nose: Nose normal.   Mouth/Throat: Mucous membranes are normal.   Eyes: Conjunctivae and lids are normal.   Neck: Trachea normal and full passive range of motion without pain. Neck supple.   Cardiovascular: Normal rate, regular rhythm and normal heart sounds.    Pulmonary/Chest: Effort normal and breath sounds normal. No respiratory distress.   Abdominal: Soft. Normal appearance and bowel sounds are normal. She exhibits no distension, no abdominal bruit, no pulsatile midline mass and no mass. There is no tenderness. There is no rigidity, no rebound, no guarding and no CVA tenderness.   Musculoskeletal: Normal range of motion. She exhibits no edema.   Neurological: She is alert and oriented to person, place, and time. She has normal strength.   Skin: Skin is warm, dry and intact. She is not diaphoretic. No pallor.   Psychiatric: She has a normal mood and affect. Her speech is normal and behavior is normal. Judgment and thought content normal. Cognition and memory are normal.   Nursing note and vitals reviewed.          Assessment:       1. Viral gastroenteritis        Plan:         Viral gastroenteritis  -     ondansetron disintegrating tablet 4 mg; Take 1 tablet (4 mg total) by mouth one time.  -     acetaminophen tablet 650 mg; Take 2 tablets (650 mg total) by mouth one time.  -     ondansetron (ZOFRAN-ODT) 4 MG TbDL; Take 1 tablet (4 mg total) by mouth every 6 (six) hours as needed (nausea).  Dispense: 20 tablet; Refill: 0      Patient Instructions   Use gatorade/pedialyte/coconut water or  rehydration packets to help stay hydrated. Vitamin water and plain water do not contain rehydrating electrolytes.  Increase clear liquids (water, gatorade, pedialyte, broths, jello, etc) Hold off on solids for 12-18 hours. Then advance to BRAT diet (banana, rice, applesauce, tea, toast/crackers), then advance further as tolerated.  Use Peptobismol/Immodium to help alleviate your diarrhea symptoms especially if greater than 6 stools per day to avoid dehydration.      Please follow up with your Primary care provider within 2-5 days if your signs and symptoms have not resolved or worsen.     If your condition worsens or fails to improve we recommend that you receive another evaluation at the emergency room immediately or contact your primary medical clinic to discuss your concerns.   You must understand that you have received an Urgent Care treatment only and that you may be released before all of your medical problems are known or treated. You, the patient, will arrange for follow up care as instructed.         Viral Gastroenteritis (Adult)    Gastroenteritis is commonly called the stomach flu. It is most often caused by a virus that affects the stomach and intestinal tract and usually lasts from 2 to 7 days. Common viruses causing gastroenteritis include norovirus, rotavirus, and hepatitis A. Non-viral causes of gastroenteritis include bacteria, parasites, and toxins.  The danger from repeated vomiting or diarrhea is dehydration. This is the loss of too much fluid from the body. When this occurs, body fluids must be replaced. Antibiotics do not help with this illness because it is usually viral.Simple home treatment will be helpful.  Symptoms of viral gastroenteritis may include:  · Watery, loose stools  · Stomach pain or abdominal cramps  · Fever and chills  · Nausea and vomiting  · Loss of bowel control  · Headache  Home care  Gastroenteritis is transmitted by contact with the stool or vomit of an infected person.  This can occur from person to person or from contact with a contaminated surface.  Follow these guidelines when caring for yourself at home:  · If symptoms are severe, rest at home for the next 24 hours or until you are feeling better.  · Wash your hands with soap and water or use alcohol-based  to prevent the spread of infection. Wash your hands after touching anyone who is sick.  · Wash your hands or use alcohol-based  after using the toilet and before meals. Clean the toilet after each use.  Remember these tips when preparing food:  · People with diarrhea should not prepare or serve food to others. When preparing foods, wash your hands before and after.  · Wash your hands after using cutting boards, countertops, knives, or utensils that have been in contact with raw food.  · Keep uncooked meats away from cooked and ready-to-eat foods.  Medicine  You may use acetaminophen or NSAID medicines like ibuprofen or naproxen to control fever unless another medicine was given. If you have chronic liver or kidney disease, talk with your healthcare provider before using these medicines. Also talk with your provider if you've had a stomach ulcer or gastrointestinal bleeding. Don't give aspirin to anyone under 18 years of age who is ill with a fever. It may cause severe liver damage. Don't use NSAIDS is you are already taking one for another condition (like arthritis) or are on aspirin (such as for heart disease or after a stroke).  If medicine for vomiting or diarrhea are prescribed, take these only as directed. Do not take over-the-counter medicines for vomiting or diarrhea unless instructed by your healthcare provider.  Diet  Follow these guidelines for food:  · Water and liquids are important so you don't get dehydrated. Drink a small amount at a time or suck on ice chips if you are vomiting.  · If you eat, avoid fatty, greasy, spicy, or fried foods.  · Don't eat dairy if you have diarrhea. This can make  diarrhea worse.  · Avoid tobacco, alcohol, and caffeine which may worsen symptoms.  During the first 24 hours (the first full day), follow the diet below:  · Beverages. Sports drinks, soft drinks without caffeine, ginger ale, mineral water (plain or flavored), decaffeinated tea and coffee. If you are very dehydrated, sports drinks aren't a good choice. They have too much sugar and not enough electrolytes. In this case, commercially available products called oral rehydration solutions, are best.  · Soups. Eat clear broth, consommé, and bouillon.  · Desserts. Eat gelatin, popsicles, and fruit juice bars.  During the next 24 hours (the second day), you may add the following to the above:  · Hot cereal, plain toast, bread, rolls, and crackers  · Plain noodles, rice, mashed potatoes, chicken noodle or rice soup  · Unsweetened canned fruit (avoid pineapple), bananas  · Limit fat intake to less than 15 grams per day. Do this by avoiding margarine, butter, oils, mayonnaise, sauces, gravies, fried foods, peanut butter, meat, poultry, and fish.  · Limit fiber and avoid raw or cooked vegetables, fresh fruits (except bananas), and bran cereals.  · Limit caffeine and chocolate. Don't use spices or seasonings other than salt.  · Limit dairy products.  · Avoid alcohol.  During the next 24 hours:  · Gradually resume a normal diet as you feel better and your symptoms improve.  · If at any time it starts getting worse again, go back to clear liquids until you feel better.  Follow-up care  Follow up with your healthcare provider, or as advised. Call your provider if you don't get better within 24 hours or if diarrhea lasts more than a week. Also follow up if you are unable to keep down liquids and get dehydrated. If a stool (diarrhea) sample was taken, call as directed for the results.  Call 911  Call 911 if any of these occur:  · Trouble breathing  · Chest pain  · Confused  · Severe drowsiness or trouble awakening  · Fainting or loss  "of consciousness  · Rapid heart rate  · Seizure  · Stiff neck  When to seek medical advice  Call your healthcare provider right away if any of these occur:  · Abdominal pain that gets worse  · Continued vomiting (unable to keep liquids down)  · Frequent diarrhea (more than 5 times a day)  · Blood in vomit or stool (black or red color)  · Dark urine, reduced urine output, or extreme thirst  · Weakness or dizziness  · Drowsiness  · Fever of 100.4°F (38°C) oral or higher that does not get better with fever medicine  · New rash  Date Last Reviewed: 1/3/2016  © 3312-2712 [employer]. [address] [place] [id number]. All rights reserved. This information is not intended as a substitute for professional medical care. Always follow your healthcare professional's instructions.        [place] Diet  Your healthcare provider may recommend a bland diet if you have an upset stomach. It consists of foods that are mild and easy to digest. It is better to eat small frequent meals rather than 3 large meals a day.    Beverages  OK: Fruit juices, non-caffeinated teas and coffee, non-carbonated jimenez  Avoid: Carbonated beverage, caffeinated tea and coffee, all alcoholic beverages  Bread  OK: Refined white, wheat or rye bread, irasema or soda crackers, Benita toast, plain rolls, bagels  Avoid: Whole-grain bread  Cereal  OK: Refined cereals: cooked or ready to eat  Avoid: Whole-grain cereals and granola, or those containing bran, seeds or nuts  Desserts  OK: Peanut butter and all others except those to "avoid"  Avoid: Chocolate, cocoa, coconut, popcorn, nuts, seeds, jam, marmalade  Fruits  OK: Canned, cooked, frozen or fresh fruits without seeds or tough skin  Avoid: Olives, skin and seeds of fruit  Meats  OK: All fresh or preserved meat, fish and fowl  Avoid: Any that are prepared with those spices to "avoid"  Cheese and eggs  OK: Eggs, cottage cheese, cream cheese, other cheeses  Avoid: All cheeses made with those " "spices to "avoid"  Potatoes and pasta  OK: Potato, rice, macaroni, noodles, spaghetti  Avoid: None  Soups  OK: All soups without heavy seasoning  Avoid: Soups made with those spices to "avoid"  Vegetables  OK: Canned, cooked, fresh or frozen mildly flavored vegetables without seeds, skins or coarse fiber  Avoid: Vegetables prepared with those spices to "avoid"; skin and seeds of vegetables and those with coarse fiber  Spices  OK: Salt, lemon and lime juice, vinegar, all extracts, barbie, cinnamon, thyme, mace, allspice, paprika  Avoid: Chili powder, cloves, pepper, seed spices, garlic, gravy pickles, highly seasoned salad dressings  Date Last Reviewed: 11/20/2015  © 0330-6943 [employer]. [address] [place] [address]. All rights reserved. This information is not intended as a substitute for professional medical care. Always follow your healthcare professional's instructions.            "

## 2018-05-07 NOTE — PATIENT INSTRUCTIONS
Use gatorade/pedialyte/coconut water or rehydration packets to help stay hydrated. Vitamin water and plain water do not contain rehydrating electrolytes.  Increase clear liquids (water, gatorade, pedialyte, broths, jello, etc) Hold off on solids for 12-18 hours. Then advance to BRAT diet (banana, rice, applesauce, tea, toast/crackers), then advance further as tolerated.  Use Peptobismol/Immodium to help alleviate your diarrhea symptoms especially if greater than 6 stools per day to avoid dehydration.      Please follow up with your Primary care provider within 2-5 days if your signs and symptoms have not resolved or worsen.     If your condition worsens or fails to improve we recommend that you receive another evaluation at the emergency room immediately or contact your primary medical clinic to discuss your concerns.   You must understand that you have received an Urgent Care treatment only and that you may be released before all of your medical problems are known or treated. You, the patient, will arrange for follow up care as instructed.         Viral Gastroenteritis (Adult)    Gastroenteritis is commonly called the stomach flu. It is most often caused by a virus that affects the stomach and intestinal tract and usually lasts from 2 to 7 days. Common viruses causing gastroenteritis include norovirus, rotavirus, and hepatitis A. Non-viral causes of gastroenteritis include bacteria, parasites, and toxins.  The danger from repeated vomiting or diarrhea is dehydration. This is the loss of too much fluid from the body. When this occurs, body fluids must be replaced. Antibiotics do not help with this illness because it is usually viral.Simple home treatment will be helpful.  Symptoms of viral gastroenteritis may include:  · Watery, loose stools  · Stomach pain or abdominal cramps  · Fever and chills  · Nausea and vomiting  · Loss of bowel control  · Headache  Home care  Gastroenteritis is transmitted by contact with  the stool or vomit of an infected person. This can occur from person to person or from contact with a contaminated surface.  Follow these guidelines when caring for yourself at home:  · If symptoms are severe, rest at home for the next 24 hours or until you are feeling better.  · Wash your hands with soap and water or use alcohol-based  to prevent the spread of infection. Wash your hands after touching anyone who is sick.  · Wash your hands or use alcohol-based  after using the toilet and before meals. Clean the toilet after each use.  Remember these tips when preparing food:  · People with diarrhea should not prepare or serve food to others. When preparing foods, wash your hands before and after.  · Wash your hands after using cutting boards, countertops, knives, or utensils that have been in contact with raw food.  · Keep uncooked meats away from cooked and ready-to-eat foods.  Medicine  You may use acetaminophen or NSAID medicines like ibuprofen or naproxen to control fever unless another medicine was given. If you have chronic liver or kidney disease, talk with your healthcare provider before using these medicines. Also talk with your provider if you've had a stomach ulcer or gastrointestinal bleeding. Don't give aspirin to anyone under 18 years of age who is ill with a fever. It may cause severe liver damage. Don't use NSAIDS is you are already taking one for another condition (like arthritis) or are on aspirin (such as for heart disease or after a stroke).  If medicine for vomiting or diarrhea are prescribed, take these only as directed. Do not take over-the-counter medicines for vomiting or diarrhea unless instructed by your healthcare provider.  Diet  Follow these guidelines for food:  · Water and liquids are important so you don't get dehydrated. Drink a small amount at a time or suck on ice chips if you are vomiting.  · If you eat, avoid fatty, greasy, spicy, or fried foods.  · Don't eat  dairy if you have diarrhea. This can make diarrhea worse.  · Avoid tobacco, alcohol, and caffeine which may worsen symptoms.  During the first 24 hours (the first full day), follow the diet below:  · Beverages. Sports drinks, soft drinks without caffeine, ginger ale, mineral water (plain or flavored), decaffeinated tea and coffee. If you are very dehydrated, sports drinks aren't a good choice. They have too much sugar and not enough electrolytes. In this case, commercially available products called oral rehydration solutions, are best.  · Soups. Eat clear broth, consommé, and bouillon.  · Desserts. Eat gelatin, popsicles, and fruit juice bars.  During the next 24 hours (the second day), you may add the following to the above:  · Hot cereal, plain toast, bread, rolls, and crackers  · Plain noodles, rice, mashed potatoes, chicken noodle or rice soup  · Unsweetened canned fruit (avoid pineapple), bananas  · Limit fat intake to less than 15 grams per day. Do this by avoiding margarine, butter, oils, mayonnaise, sauces, gravies, fried foods, peanut butter, meat, poultry, and fish.  · Limit fiber and avoid raw or cooked vegetables, fresh fruits (except bananas), and bran cereals.  · Limit caffeine and chocolate. Don't use spices or seasonings other than salt.  · Limit dairy products.  · Avoid alcohol.  During the next 24 hours:  · Gradually resume a normal diet as you feel better and your symptoms improve.  · If at any time it starts getting worse again, go back to clear liquids until you feel better.  Follow-up care  Follow up with your healthcare provider, or as advised. Call your provider if you don't get better within 24 hours or if diarrhea lasts more than a week. Also follow up if you are unable to keep down liquids and get dehydrated. If a stool (diarrhea) sample was taken, call as directed for the results.  Call 911  Call 911 if any of these occur:  · Trouble breathing  · Chest pain  · Confused  · Severe  "drowsiness or trouble awakening  · Fainting or loss of consciousness  · Rapid heart rate  · Seizure  · Stiff neck  When to seek medical advice  Call your healthcare provider right away if any of these occur:  · Abdominal pain that gets worse  · Continued vomiting (unable to keep liquids down)  · Frequent diarrhea (more than 5 times a day)  · Blood in vomit or stool (black or red color)  · Dark urine, reduced urine output, or extreme thirst  · Weakness or dizziness  · Drowsiness  · Fever of 100.4°F (38°C) oral or higher that does not get better with fever medicine  · New rash  Date Last Reviewed: 1/3/2016  © 2777-6316 [employer]. [address] [place] [address], [place] [address]. All rights reserved. This information is not intended as a substitute for professional medical care. Always follow your healthcare professional's instructions.        [place] Diet  Your healthcare provider may recommend a bland diet if you have an upset stomach. It consists of foods that are mild and easy to digest. It is better to eat small frequent meals rather than 3 large meals a day.    Beverages  OK: Fruit juices, non-caffeinated teas and coffee, non-carbonated jimenez  Avoid: Carbonated beverage, caffeinated tea and coffee, all alcoholic beverages  Bread  OK: Refined white, wheat or rye bread, irasema or soda crackers, Benita toast, plain rolls, bagels  Avoid: Whole-grain bread  Cereal  OK: Refined cereals: cooked or ready to eat  Avoid: Whole-grain cereals and granola, or those containing bran, seeds or nuts  Desserts  OK: Peanut butter and all others except those to "avoid"  Avoid: Chocolate, cocoa, coconut, popcorn, nuts, seeds, jam, marmalade  Fruits  OK: Canned, cooked, frozen or fresh fruits without seeds or tough skin  Avoid: Olives, skin and seeds of fruit  Meats  OK: All fresh or preserved meat, fish and fowl  Avoid: Any that are prepared with those spices to "avoid"  Cheese and eggs  OK: Eggs, cottage cheese, cream cheese, " "other cheeses  Avoid: All cheeses made with those spices to "avoid"  Potatoes and pasta  OK: Potato, rice, macaroni, noodles, spaghetti  Avoid: None  Soups  OK: All soups without heavy seasoning  Avoid: Soups made with those spices to "avoid"  Vegetables  OK: Canned, cooked, fresh or frozen mildly flavored vegetables without seeds, skins or coarse fiber  Avoid: Vegetables prepared with those spices to "avoid"; skin and seeds of vegetables and those with coarse fiber  Spices  OK: Salt, lemon and lime juice, vinegar, all extracts, barbie, cinnamon, thyme, mace, allspice, paprika  Avoid: Chili powder, cloves, pepper, seed spices, garlic, gravy pickles, highly seasoned salad dressings  Date Last Reviewed: 11/20/2015  © 4725-5325 [employer]. [address] [place] [address]. All rights reserved. This information is not intended as a substitute for professional medical care. Always follow your healthcare professional's instructions.        "

## 2018-05-07 NOTE — LETTER
May 7, 2018      [facility]  [address] [address]  Phone: [phone]  Fax: [phone]       Patient: Christie Bay   YOB: 1985  Date of Visit: 05/07/2018    To Whom It May Concern:    Bandar Bay  was at [facility] on 05/07/2018. She may return to work/school on 05/09/2018 with no restrictions. If you have any questions or concerns, or if I can be of further assistance, please do not hesitate to contact me.    Sincerely,        Olive Wbeb NP

## 2018-10-04 ENCOUNTER — OFFICE VISIT (OUTPATIENT)
Dept: SPORTS MEDICINE | Facility: CLINIC | Age: 33
End: 2018-10-04
Payer: COMMERCIAL

## 2018-10-04 ENCOUNTER — TELEPHONE (OUTPATIENT)
Dept: SPORTS MEDICINE | Facility: CLINIC | Age: 33
End: 2018-10-04

## 2018-10-04 VITALS — HEIGHT: 66 IN | TEMPERATURE: 98 F | WEIGHT: 145 LBS | BODY MASS INDEX: 23.3 KG/M2

## 2018-10-04 DIAGNOSIS — Z00.00 ROUTINE MEDICAL EXAM: Primary | ICD-10-CM

## 2018-10-04 DIAGNOSIS — M72.2 PLANTAR FASCIA SYNDROME: Primary | ICD-10-CM

## 2018-10-04 PROCEDURE — 99499 UNLISTED E&M SERVICE: CPT | Mod: S$GLB,,, | Performed by: FAMILY MEDICINE

## 2018-10-04 PROCEDURE — 99999 PR PBB SHADOW E&M-EST. PATIENT-LVL II: CPT | Mod: PBBFAC,,, | Performed by: FAMILY MEDICINE

## 2018-10-04 RX ORDER — MELOXICAM 7.5 MG/1
7.5 TABLET ORAL DAILY
Qty: 15 TABLET | Refills: 0 | Status: SHIPPED | OUTPATIENT
Start: 2018-10-04 | End: 2019-04-18 | Stop reason: DRUGHIGH

## 2018-10-04 NOTE — TELEPHONE ENCOUNTER
Patient communication:    1. Patient is TTP over the calcaneal insertion of plantar fascia L.   2. Patient reports that this is a chronic issue.    Prescription for Meloxicam  [facility] plantar fasciitis sock recommendation.

## 2018-10-04 NOTE — TELEPHONE ENCOUNTER
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, left    Imaging studies reviewed as part of medical evaluation:   none    Plan:  #1  A)   Relative rest (no running)  Begin 14 d course of meloxicam  Continue hgPT re: stretching PF  Begin use of nocturnal Achilles / PF stretching splint  Above x 14 d, then reassess    We discussed options including:  #1 watchful waiting  #2 continue physical therapy aimed at:   Stretching plantar fascia   #3 injection therapy:   orthobiologics   #4 x-ray/MRI for further evaluation     The patient chooses #1 and #2 as above     Pain management: handout given  Bracing: Achilles / PF stretching splint, use nocturnally as above   Physical therapy: hgPT, w/ univAT, continue as above     F/u in 14d as above

## 2018-12-14 ENCOUNTER — NUTRITION (OUTPATIENT)
Dept: NUTRITION | Facility: CLINIC | Age: 33
End: 2018-12-14
Payer: COMMERCIAL

## 2018-12-14 DIAGNOSIS — Z71.3 DIETARY COUNSELING AND SURVEILLANCE: Primary | ICD-10-CM

## 2018-12-14 DIAGNOSIS — Z00.00 WELLNESS EXAMINATION: ICD-10-CM

## 2018-12-14 PROCEDURE — 97802 MEDICAL NUTRITION INDIV IN: CPT | Mod: S$GLB,,, | Performed by: DIETITIAN, REGISTERED

## 2018-12-14 NOTE — PROGRESS NOTES
"Nutrition Assessment for Initial Medical Nutrition Therapy    Referring professional: self    Reason for MNT visit: Pt in for education and nutrition counseling regarding dietary counseling + surveillance.       ASSESSMENT    Age: 33 y.o.  Wt:   Wt Readings from Last 1 Encounters:   10/04/18 65.8 kg (145 lb)     Ht:   Ht Readings from Last 1 Encounters:   10/04/18 5' 6" (1.676 m)     BMI:   BMI Readings from Last 1 Encounters:   10/04/18 23.40 kg/m²       Clinical signs/symptoms: none to report at this time     Medical History:   Past Medical History:   Diagnosis Date    History of shingles     Migraine headache     Ulcer      Problem List           Resolved    Migraine headache               Medications:   Current Outpatient Medications:     azithromycin (ZITHROMAX Z-SONIA) 250 MG tablet, Take 2 tablets (500 mg) on  Day 1,  followed by 1 tablet (250 mg) once daily on Days 2 through 5., Disp: 6 tablet, Rfl: 0    fluticasone (FLONASE) 50 mcg/actuation nasal spray, 2 sprays (100 mcg total) by Each Nare route once daily., Disp: 1 Bottle, Rfl: 0    meloxicam (MOBIC) 7.5 MG tablet, Take 1 tablet (7.5 mg total) by mouth once daily., Disp: 15 tablet, Rfl: 0    ondansetron (ZOFRAN-ODT) 4 MG TbDL, Take 1 tablet (4 mg total) by mouth every 6 (six) hours as needed (nausea)., Disp: 20 tablet, Rfl: 0    Supplements: none     Food/medication interactions:   mobic- caution with GI irritants like K supplements, limit caffeine to reduce GI effects, avoid or limit natural products like garlic, ginger, ginseng, gingko, horse chestnut)  Zofran - no known food interactions    Food allergies  intolerances: NKA    Labs:  Reviewed   Hemoglobin A1C   Date Value Ref Range Status   07/28/2014 5.0 4.5 - 6.2 % Final     Cholesterol   Date Value Ref Range Status   07/06/2017 209 (H) 120 - 199 mg/dL Final     Comment:     The National Cholesterol Education Program (NCEP) has set the  following guidelines (reference ranges) for " Cholesterol:  Optimal.....................<200 mg/dL  Borderline High.............200-239 mg/dL  High........................> or = 240 mg/dL     07/28/2014 225 (H) 120 - 199 mg/dL Final     Comment:     The National Cholesterol Education Program (NCEP) has set the  following guidelines (reference ranges) for Cholesterol:  Optimal.....................<200 mg/dL  Borderline High.............200-239 mg/dL  High........................> or = 240 mg/dL       Triglycerides   Date Value Ref Range Status   07/06/2017 50 30 - 150 mg/dL Final     Comment:     The National Cholesterol Education Program (NCEP) has set the  following guidelines (reference values) for triglycerides:  Normal......................<150 mg/dL  Borderline High.............150-199 mg/dL  High........................200-499 mg/dL     07/28/2014 53 30 - 150 mg/dL Final     Comment:     The National Cholesterol Education Program (NCEP) has set the  following guidelines (reference values) for triglycerides:  Normal......................<150 mg/dL  Borderline High.............150-199 mg/dL  High........................200-499 mg/dL       HDL   Date Value Ref Range Status   07/06/2017 62 40 - 75 mg/dL Final     Comment:     The National Cholesterol Education Program (NCEP) has set the  following guidelines (reference values) for HDL Cholesterol:  Low...............<40 mg/dL  Optimal...........>60 mg/dL     07/28/2014 73 40 - 75 mg/dL Final     Comment:     The National Cholesterol Education Program (NCEP) has set the  following guidelines (reference values) for HDL Cholesterol:  Low...............<40 mg/dL  Optimal...........>60 mg/dL       LDL Cholesterol   Date Value Ref Range Status   07/06/2017 137.0 63.0 - 159.0 mg/dL Final     Comment:     The National Cholesterol Education Program (NCEP) has set the  following guidelines (reference values) for LDL Cholesterol:  Optimal.......................<130 mg/dL  Borderline High...............130-159  mg/dL  High..........................160-189 mg/dL  Very High.....................>190 mg/dL     07/28/2014 141.4 63.0 - 159.0 mg/dL Final     Comment:     The National Cholesterol Education Program (NCEP) has set the  following guidelines (reference values) for LDL Cholesterol:  Optimal.......................<130 mg/dL  Borderline High...............130-159 mg/dL  High..........................160-189 mg/dL  Very High.....................>190 mg/dL         Typical day recall:  Up at 7-7:30am  Breakfast: whole wheat + PB toast    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 ()   Grabs snacks like PB sandwich, gold fish, pretzels    Dinner: frozen bag - one pan saute    Home around 9 pm- shower + bed    Reports will eat vegetables, but doesn't really like them   Likes salads      Beverages:   Water  Occasional OJ  Black coffee    Dining out: pretty regularly - especially if family is in town visiting    Lifestyle Influences  Support System: spouse     Meal preparation/shopping: self; Sophie    Current Activity Level: 30-45 minute run in am    Patient motivation, anticipated barriers, expected compliance: Patient verbalized her motivation and is willing to comply     DIAGNOSIS   Problem: Food and nutrition related knowledge deficit  Etiology: lack of prior exposure to accurate nutrition related information  Signs/Symptoms: food recall     INTERVENTION  Nutrition prescription: estimated energy requirements: ~1500 calories/day (22 kcal/kg bw)  Estimated minimum daily protein needs: (55-68 g/kg bw)      Recommendations & Goals:  Patient goals and recommendations are tailored to the specific patient's needs, readiness to change, lifestyle, culture, skills, resources, & abilities. Strategies to help achieve these nutrition-related goals were discussed which can include but are not limited to SMART goal setting & mindful eating.    ? Try to eat breakfast within 1-2 hours of waking up  ? Try not to skip  any meals or snacks, not going more than 3-4 hours without eating.   ? At each meal and snack, try to include a source of lean protein + fiber-rich carb (see next bullet) + heart-healthy plant-based fat   ? Consider obtaining 15-20 grams carbohydrates (1 serving) per meal and snack:  One serving/15-20 grams =      1 cup fruit   1 cup milk or flavored yogurt  ½ cup grains (ideally whole grains like brown rice, quinoa, whole wheat pasta, etc)       ½ cup starchy veggies (corn, peas, potatoes)      1-2 slices of 100% whole grain or whole wheat bread    ? Aim to exercise 60 minutes most days or aim to obtain 10,000 steps per day throughout work day  ? Aim for a minimum of 7-8 hours sleep nightly    Breakfast     2 eggs muffins (or other preferred cooked eggs) + carb choice   Oatmeal   o ½ cup oats + vanilla extract + cinnamon + Fairlife Milk + 1 teaspoon honey or brown sugar   o Fresh or frozen fruit of choice   Smoothie   o Fruit (1 cup)   o 1 scoop low sugar protein powder ( Whey, Pure,) or 1 cup Greek yogurt  o Spinach leaves  o 3-4 ice cubes (not needed if using frozen fruit)  o ½-1 cup liquid of choice like milk, coconut water, et.   Breakfast Whitmore  o 100% whole grain bread (Hemal 100% whole wheat English muffin)   o 1 egg + extra egg whites  o Unlimited veggies (I recommend sautéed)  o 1 slice cheese or ¼ avocado   o Tomato + spinach   Breakfast burrito  o 1 egg + extra egg whites  o Unlimited veggies (I recommend sautéed)  o Salsa  o 1 whole grain or corn tortillas (like Surinder Elaine)  o ¼ avocado   Kashi 7 grain or blueberry waffle/s + soy or sun butter (2 tablespoons) + jam (2 tablespoons)  o Recommended jelly/jam: [place], [place], Smuckers Simply Fruit  o Other waffle brands (on grocery list): Vans 8 grains, [place] Cakes vanilla buttermilk    Snacks      ¼ cup nuts   2 hard boiled or devilled eggs   Fruit of choice (1 piece or size of a tennis ball, i.e. orange, pear, peach, etc or 1  cup melon/berries) + protein:  o nuts (2 tablespoons or 100 calorie pack)   o nut butter (1 tablespoon)  o cheese (reduced fat, light, part-skim, 2% cheese options)  o jerky (see grocery list for recommended brands)  o hard boiled egg/s (1 yolk)    Yogurt Bowl  o Fruit (1/2 cup)  o 6-8 ounces plain (Fage 2%) Greek yogurt  o splash of vanilla extract, stevia extract, madhava agave 5 as needed   o 2 tbsp nuts or seeds   Flavored Greek Yogurt (no added sugar)   o Chobani simply 100  o Dannon oikos triple zero  o Chobani simply 100 crunch   10-12 Beanitos + 100 calorie guacamole cup or laughing cow wedge (2)   Protein drink (less than 5 grams sugar + 20-30 grams protein)   o Iconic  o Premier  o Orgain  o Muscle Milk Light    Protein bar (less than 5-6 grams sugar; 10-20 grams protein; ~ 150-200 calories)  o Nature Valley Protein  o Powercrunch  o Oatmega  o Think Thin   Kashi Go Lean Cereal    Sargento Balance Breaks    Vans Say Cheese Crackers     lunch // dinner     4- 6 ounces lean protein (1/4 plate portion) + unlimited non-starchy veggie (2 cups or ½ plate portion) + optional 1 carb choice as needed (1/2 cup or ¼ plate portion)   Plate method portions shown  Click hyperlinks for sample recipes       Stir martínez or other saute style one pan dinners with lean proteins + veggies   o Chicken sausage, veggies + pesto saute (with lentil pasta? or zucchini noodles?)   Slow Cooker:  o Flank Steak Fajitas   o Shredded Chicken Tacos   o Pulled Pork Tenderloin     Written Materials Provided  These resources are intended to assist the patient in making it easier to choose recommended options when eating out & to identify better-for-you brands at the grocery store:     Meal Planning Guide with recommendations discussed along with portion sizes and a customized meal plan    Eat Fit miriam card as a reminder to download the miriam to ones smart phone which provides: current Eat Fit partners, approved menu options, food  labels for carb counting, & recipes    Fast Food Lite Guide   Eat Fit Grocery Product list    RD contact information- for patient to contact regarding any questions, needs, and/or concerns that may arise     MONITORING & EVALUATION    Communicated with healthcare provider: not needed at this time     Documented plan for referral to appropriate agency/healthcare provider as needed: not needed at this time     Comprehension: good     Motivation to change: moderate    Follow-up: 1 year if not needed before     Counseling time: 1 hour

## 2019-04-18 ENCOUNTER — OFFICE VISIT (OUTPATIENT)
Dept: ORTHOPEDICS | Facility: CLINIC | Age: 34
End: 2019-04-18
Payer: COMMERCIAL

## 2019-04-18 VITALS
DIASTOLIC BLOOD PRESSURE: 64 MMHG | SYSTOLIC BLOOD PRESSURE: 104 MMHG | HEART RATE: 69 BPM | HEIGHT: 66 IN | BODY MASS INDEX: 23.3 KG/M2 | WEIGHT: 145 LBS

## 2019-04-18 DIAGNOSIS — M99.03 SOMATIC DYSFUNCTION OF LUMBAR REGION: ICD-10-CM

## 2019-04-18 DIAGNOSIS — M99.06 SOMATIC DYSFUNCTION OF LOWER EXTREMITY: ICD-10-CM

## 2019-04-18 DIAGNOSIS — M54.59 MECHANICAL LOW BACK PAIN: Primary | ICD-10-CM

## 2019-04-18 DIAGNOSIS — M99.04 SOMATIC DYSFUNCTION OF SACRAL REGION: ICD-10-CM

## 2019-04-18 DIAGNOSIS — M72.2 PLANTAR FASCIITIS OF LEFT FOOT: ICD-10-CM

## 2019-04-18 DIAGNOSIS — M99.05 SOMATIC DYSFUNCTION OF PELVIS REGION: ICD-10-CM

## 2019-04-18 DIAGNOSIS — M79.10 MYALGIA: ICD-10-CM

## 2019-04-18 PROCEDURE — 3008F PR BODY MASS INDEX (BMI) DOCUMENTED: ICD-10-PCS | Mod: CPTII,S$GLB,, | Performed by: ORTHOPAEDIC SURGERY

## 2019-04-18 PROCEDURE — 99999 PR PBB SHADOW E&M-EST. PATIENT-LVL III: ICD-10-PCS | Mod: PBBFAC,,, | Performed by: ORTHOPAEDIC SURGERY

## 2019-04-18 PROCEDURE — 98926 PR OSTEOPATHIC MANIP,3-4 BODY REGN: ICD-10-PCS | Mod: S$GLB,,, | Performed by: ORTHOPAEDIC SURGERY

## 2019-04-18 PROCEDURE — 98926 OSTEOPATH MANJ 3-4 REGIONS: CPT | Mod: S$GLB,,, | Performed by: ORTHOPAEDIC SURGERY

## 2019-04-18 PROCEDURE — 3008F BODY MASS INDEX DOCD: CPT | Mod: CPTII,S$GLB,, | Performed by: ORTHOPAEDIC SURGERY

## 2019-04-18 PROCEDURE — 99214 OFFICE O/P EST MOD 30 MIN: CPT | Mod: 25,S$GLB,, | Performed by: ORTHOPAEDIC SURGERY

## 2019-04-18 PROCEDURE — 99214 PR OFFICE/OUTPT VISIT, EST, LEVL IV, 30-39 MIN: ICD-10-PCS | Mod: 25,S$GLB,, | Performed by: ORTHOPAEDIC SURGERY

## 2019-04-18 PROCEDURE — 99999 PR PBB SHADOW E&M-EST. PATIENT-LVL III: CPT | Mod: PBBFAC,,, | Performed by: ORTHOPAEDIC SURGERY

## 2019-04-18 RX ORDER — MELOXICAM 15 MG/1
15 TABLET ORAL DAILY
Qty: 30 TABLET | Refills: 0 | Status: SHIPPED | OUTPATIENT
Start: 2019-04-18 | End: 2019-07-31 | Stop reason: ALTCHOICE

## 2019-04-18 NOTE — PROGRESS NOTES
"CC: right low back pain, left foot pain    34 y.o. Female presents today for evaluation of her right low back pain and left foot pain.   How long: Patient reports her back pain has been present for years but became worse yesterday. Patient denies any abnormal activities. She denies any injury or trauma. Patient describes her pain as being "stuck in the hip with a knife".  She is an  who spends most per day on her feet at the soccer field and feels like this is contributing to her pain. She states that the new sharp twinge happened when she was bending down to  her equipment.  This happened yesterday and she denies any radiation of pain away from her right lateral low back.  What makes it better: Patient reports she feels better when laying flat on her back.   What makes it worse: Patient reports she feels worse when sitting.   Does it radiate: Patient denies any radiating pain.   Numbness/tingling down legs: Patient denies any numbness or tingling.   Attempted treatments: Patient iced last night. Patient is taking Advil for pain but states it is not helping with her pain.   Pain score:  She rates her pain as a 6/10 on the pain scale at this time.  Any mechanical symptoms: Patient denies any mechanical pain.   Feelings of instability: Patient denies any instability.   Problems with ADLs: Patient reports this is not affecting her ADLs.     As for her foot, she states that she has previously seen Dr. Mercedes for this and was started on Mobic 7.5 mg daily for 2 weeks, was given a night splint to wear, and has been provided with home exercises.  She states that she is doing her exercises regularly and and from the above treatment plan she admits to about 60-65% improvement overall in her symptoms. Her pain still bothers her every day, especially in the morning.  She has not tried physical therapy and has not done injections yet.  She denies any right foot problems.    REVIEW OF SYSTEMS: "   Constitution: Patient denies fever, chills, night sweats, and weight changes.  Eyes: Patient denies eye pain or vision changes.  HENT: Patient denies headache, ear pain, sore throat, or nasal discharge.  CVS: Patient denies chest pain.  Lungs: Patient denies shortness of breath or cough.  Abd: Patient denies stomach pain, nausea, or vomiting.  Skin: Patient denies skin rash or itching.    Hematologic/Lymphatic: Patient denies easy bruising.   Musculoskeletal: Patient denies recent falls. See HPI.  Psych: Patient denies any current anxiety or nervousness.    PAST MEDICAL HISTORY:   Past Medical History:   Diagnosis Date    History of shingles     Migraine headache     Ulcer        PAST SURGICAL HISTORY:   Past Surgical History:   Procedure Laterality Date    CHOLECYSTECTOMY      KNEE SURGERY Left     SHOULDER SURGERY Right        FAMILY HISTORY:   Family History   Problem Relation Age of Onset    Migraines Mother     Skin cancer Mother     Cancer Mother         skin    Melanoma Mother     Hypertension Father     Diabetes Maternal Grandmother     Breast cancer Maternal Grandmother     Cancer Maternal Grandmother         breast    Brain cancer Paternal Grandfather     Cancer Paternal Grandfather         brain    Ovarian cancer Neg Hx     Colon cancer Neg Hx        SOCIAL HISTORY:   Social History     Socioeconomic History    Marital status:      Spouse name: Not on file    Number of children: 0    Years of education: Not on file    Highest education level: Not on file   Occupational History    Occupation: ATC     Employer: [employer] ([employer])   Social Needs    Financial resource strain: Not on file    Food insecurity:     Worry: Not on file     Inability: Not on file    Transportation needs:     Medical: Not on file     Non-medical: Not on file   Tobacco Use    Smoking status: Never Smoker    Smokeless tobacco: Never Used   Substance and Sexual Activity    Alcohol use:  "Yes     Comment: 2 per month.    Drug use: Yes    Sexual activity: Yes   Lifestyle    Physical activity:     Days per week: Not on file     Minutes per session: Not on file    Stress: Not on file   Relationships    Social connections:     Talks on phone: Not on file     Gets together: Not on file     Attends Scientologist service: Not on file     Active member of club or organization: Not on file     Attends meetings of clubs or organizations: Not on file     Relationship status: Not on file   Other Topics Concern    Are you pregnant or think you may be? Not Asked    Breast-feeding Not Asked   Social History Narrative    Not on file       MEDICATIONS:     Current Outpatient Medications:     azithromycin (ZITHROMAX Z-SONIA) 250 MG tablet, Take 2 tablets (500 mg) on  Day 1,  followed by 1 tablet (250 mg) once daily on Days 2 through 5., Disp: 6 tablet, Rfl: 0    fluticasone (FLONASE) 50 mcg/actuation nasal spray, 2 sprays (100 mcg total) by Each Nare route once daily., Disp: 1 Bottle, Rfl: 0    meloxicam (MOBIC) 15 MG tablet, Take 1 tablet (15 mg total) by mouth once daily., Disp: 30 tablet, Rfl: 0    ondansetron (ZOFRAN-ODT) 4 MG TbDL, Take 1 tablet (4 mg total) by mouth every 6 (six) hours as needed (nausea)., Disp: 20 tablet, Rfl: 0    ALLERGIES:   Review of patient's allergies indicates:   Allergen Reactions    Codeine Anaphylaxis    Demerol [meperidine] Anaphylaxis    Morphine Anaphylaxis        PHYSICAL EXAMINATION:  /64   Pulse 69   Ht 5' 6" (1.676 m)   Wt 65.8 kg (145 lb)   BMI 23.40 kg/m²   Vitals signs and nursing note have been reviewed.  General: In no acute distress, well developed, well nourished, no diaphoresis  Eyes: EOM full and smooth, no eye redness or discharge  HENT: normocephalic and atraumatic, neck supple, trachea midline, no nasal discharge, no external ear redness or discharge  Cardiovascular: no LE edema  Lungs: respirations non-labored, no conversational dyspnea   Abd: " non-distended, no rigidity  MSK: no amputation or deformity, no swelling of extremities  Neuro: AAOx3, CN2-12 grossly intact  Skin: No rashes, warm and dry  Psychiatric: cooperative, pleasant, mood and affect appropriate for age    Lumbar Spine: right lumbar region    Observation:    Normal cervicothoracolumbar curves.    No obvious pelvic obliquity while standing.    No edema, erythema, or ecchymosis noted in lumbosacral region.    No midline skin abnormalities.    No atrophy of lower limb musculature.    Tenderness:  No tenderness throughout the lumbar spine, iliolumbar region, posterior pelvis.  No tenderness over the sacrum, piriformis, greater/lesser trochanters.  No bony deformities or step-offs palpated.   Tenderness over palpable fascial herniated trigger point just superior to PSIS on the right.    Range of Motion:  Active flexion to 60°.   Active extension to 25°.   Active rotation to 30° on left and 30° on right.    Active sidebending to 25° on left and 25° on right.  Passive hip flexion to 135° on left and 135° on right.    Passive hip internal rotation to 45° on left and 45° on right.    Passive hip external rotation to 45° on left and 45° on right.    All ROM testing is without pain.    Strength Testing:  Hip flexion - 5/5 on left and 5/5 on right  Hip extension - 5/5 on left and 5/5 on right  Knee flexion - 5/5 on left and 5/5 on right  Knee extension - 5/5 on left and 5/5 on right  Dorsiflexion - 5/5 on left and 5/5 on right  Plantarflexion - 5/5 on left and 5/5 on right  Great toe extension - 5/5 on left and 5/5 on right    Special Tests:  Standing Delarosa's test - negative on left and negative on right  Stork test - negative on left and negative on right    Seated straight leg raise - negative on left and negative on right  Supine straight leg raise - negative on left and negative on right  Slump test - negative on left and negative on right  Provocation maneuvers exhibit no worsening of symptoms on  left or on right.    FOOT: left   The affected foot is compared to the contralateral foot.    Observation:    There is no edema, erythema, or ecchymosis.   Shoes reveal a normal wear pattern.  No structural deformities including pes planus/cavus, hallux valgus, or toe deformities.  Normal callus pattern on the feet bilaterally.    Achilles tendon and calcaneal insertion reveals no deformities  No leg or intrinsic foot muscle atrophy.    ROM: (expected degree)  Active dorsiflexion to 20° bilaterally.    Active plantarflexion to 50° bilaterally.    Active ankle inversion to 35° bilaterally.    Active ankle eversion to 15° bilaterally.    Full active flexion/extension of the toes bilaterally.   Heel cords without tightness bilaterally.    Tenderness To Palpation:  No tenderness at the ATFL, CFL, PTFL, or deltoid ligaments  No tenderness over the distal anterior syndesmosis, distal tibia/fibula, fibular head/shaft  No tenderness at medial or lateral malleoli   No tenderness at navicular, cuboid, cuneiforms, talus, or calcaneous  No tenderness along the metatarsals or phalanges  No tenderness at the Achilles tendon calcaneal insertion  No tenderness at the posterior tibial or peroneal tendons  Tenderness at plantar fascia attachment at medial hindfoot    Strength Testing:  Dorsiflexion - 5/5  Platarflexion - 5/5  Resisted Inversion - 5/5  Resisted Eversion - 5/5  Great Toe Extension - 5/5  Great Toe Flexion - 5/5    Posture:  Upright  Gait: Non-antalgic with Neutral ankle mechanics     TART (Tissue texture abnormality, Asymmetry,  Restriction of motion and/or Tenderness) changes:     Cervical Spine  Thoracic Spine  Lumbar Spine   C1  T1  L1 Neutral   C2  T2  L2 ERS L   C3  T3  L3 Neutral   C4  T4  L4 Neutral   C5  T5  L5 ERS R   C6  T6 Neutral     C7  T7 Neutral       T8 Neutral       T9 Neutral       T10 Neutral       T11 Neutral       T12 Neutral       Pelvis:  · Innominate:Right posterior rotation  · Pubic  bone:Neutral    Sacrum:Left on Left sacral torsion    Lower extremity:  Joint restriction at tibiotalar joint and multiple midfoot joints on left  Myofascial restriction left lower extremity    Key   F= Flexed   E = Extended   R = Rotated   S = Sidebent   TTA = tissue texture abnormality       Neurovascular Exam:    Sensation intact to light touch in the L2-S2 dermatomes bilaterally.   No pretibial edema or abnormal hair pattern of the shin.    Intact and symmetric DP and PT pulses bilaterally.  Normal gait without trendelenberg, heel walking, toe walking, and tandem walking.    IMAGING:  No imaging obtained today.    ASSESSMENT:      ICD-10-CM ICD-9-CM   1. Mechanical low back pain M54.5 724.2   2. Myalgia M79.10 729.1   3. Plantar fasciitis of left foot M72.2 728.71   4. Somatic dysfunction of lumbar region M99.03 739.3   5. Somatic dysfunction of pelvis region M99.05 739.5   6. Somatic dysfunction of lower extremity M99.06 739.6   7. Somatic dysfunction of sacral region M99.04 739.4       PLAN:  1-2.  Mechanical low back pain/myalgia -     - Christie has had right-sided low back pain for many years, but while at the soccer field yesterday she bent forward to  a piece of her equipment and felt a sharp twinge in her lower back.  This pain did not radiate and his state in her right lateral low back since.    - Based on her description of her pain and body language, I discussed osteopathic manipulation with her today. She is interested in this and consents to treatment.  See below for further details.    - Of note, after addressing the somatic dysfunction with FDM, she admitted to improvement in her symptoms.      3.  Plantar fasciitis left foot -     - She has found about 65% relief with Mobic 7.5 mg, a night splint, and exercises, but is still dealing with symptoms on a daily basis.  She is interested in getting head of this further and has not wanted to get as bad as it was before.  It is also very likely  that this is compensatory from her chronic right-sided low back pain as she has likely been favoring her right side and putting more stress on her left.    - Prescription for Mobic 15 mg tablets to be taken daily for 14 days sent to her pharmacy.  I advised 2 weeks of the medicine for a good anti-inflammatory effect.  This will likely also help with her low back pain.    - I recommend physical therapy at this time. She would like to do this at the elbow location. OMT was also directed at this area after consent was obtained.    - Continue with home stretches and ice massage.  Also continue with nighttime splint as tolerated.      4-7.  Somatic dysfunction of lumbar, pelvis, lower extremity, sacral regions -     - OMT 3-4 regions. Oral consent obtained. Reviewed benefits and potential side effects. OMT indicated today due to signs and symptoms as well as local and remote somatic dysfunction findings and their related neurokinetic, lymphatic, fascial and/or arteriovenous body connections. OMT techniques used: Soft Tissue, Myofascial Release, Muscle Energy, High Velocity Low Amplitude, Fascial Distortion Model and Articulatory. Treatment was tolerated well. Improvement noted in segmental mobility post-treatment in dysfunctional regions. There were no adverse events and no complications immediately following treatment. Advised plenty of water to help alleviate soreness.      Future planning includes - PT for plantar fasciitis.  Possibly more OMT if helpful and indicated for her low back pain.    All questions were answered to the best of my ability and all concerns were addressed at this time.    Follow up pending response to OMT today. She will get in touch with us in the next couple weeks and we will determine next follow-up, if needed, at that time.

## 2019-04-18 NOTE — LETTER
April 18, 2019      [facility]            [facility]  [address] [place] [address]  Phone: [phone]          Patient: Christie Bay   MR Number: [id number]   YOB: 1985   Date of Visit: 4/18/2019       Dear [facility] :    Thank you for referring Christie Bay to me for evaluation. Attached you will find relevant portions of my assessment and plan of care.    If you have questions, please do not hesitate to call me. I look forward to following Christie Bay along with you.    Sincerely,    Jac Khan, DO    Enclosure  CC:  No Recipients    If you would like to receive this communication electronically, please contact [email or web address] or [phone] to request more information on [employer] Link access.    For providers and/or their staff who would like to refer a patient to [facility], please contact us through our one-stop-shop provider referral line, Sj Olson, at [phone].    If you feel you have received this communication in error or would no longer like to receive these types of communications, please e-mail [email or web address]

## 2019-07-29 DIAGNOSIS — Z31.49 PROCREATION MANAGEMENT INVESTIGATION AND TESTING: Primary | ICD-10-CM

## 2019-07-31 ENCOUNTER — OFFICE VISIT (OUTPATIENT)
Dept: INTERNAL MEDICINE | Facility: CLINIC | Age: 34
End: 2019-07-31
Attending: FAMILY MEDICINE
Payer: COMMERCIAL

## 2019-07-31 ENCOUNTER — LAB VISIT (OUTPATIENT)
Dept: LAB | Facility: HOSPITAL | Age: 34
End: 2019-07-31
Attending: OBSTETRICS & GYNECOLOGY
Payer: COMMERCIAL

## 2019-07-31 VITALS
HEIGHT: 66 IN | TEMPERATURE: 98 F | OXYGEN SATURATION: 95 % | WEIGHT: 151.56 LBS | DIASTOLIC BLOOD PRESSURE: 62 MMHG | BODY MASS INDEX: 24.36 KG/M2 | HEART RATE: 65 BPM | SYSTOLIC BLOOD PRESSURE: 96 MMHG

## 2019-07-31 DIAGNOSIS — Z31.49 PROCREATION MANAGEMENT INVESTIGATION AND TESTING: ICD-10-CM

## 2019-07-31 DIAGNOSIS — Z00.00 ANNUAL PHYSICAL EXAM: Primary | ICD-10-CM

## 2019-07-31 DIAGNOSIS — Z00.00 ANNUAL PHYSICAL EXAM: ICD-10-CM

## 2019-07-31 LAB
ALBUMIN SERPL BCP-MCNC: 4.4 G/DL (ref 3.5–5.2)
ALP SERPL-CCNC: 68 U/L (ref 55–135)
ALT SERPL W/O P-5'-P-CCNC: 14 U/L (ref 10–44)
ANION GAP SERPL CALC-SCNC: 7 MMOL/L (ref 8–16)
AST SERPL-CCNC: 19 U/L (ref 10–40)
BILIRUB SERPL-MCNC: 0.7 MG/DL (ref 0.1–1)
BUN SERPL-MCNC: 13 MG/DL (ref 6–20)
CALCIUM SERPL-MCNC: 9.6 MG/DL (ref 8.7–10.5)
CHLORIDE SERPL-SCNC: 104 MMOL/L (ref 95–110)
CHOLEST SERPL-MCNC: 178 MG/DL (ref 120–199)
CHOLEST/HDLC SERPL: 2.7 {RATIO} (ref 2–5)
CO2 SERPL-SCNC: 30 MMOL/L (ref 23–29)
CREAT SERPL-MCNC: 0.8 MG/DL (ref 0.5–1.4)
ERYTHROCYTE [DISTWIDTH] IN BLOOD BY AUTOMATED COUNT: 11.7 % (ref 11.5–14.5)
EST. GFR  (AFRICAN AMERICAN): >60 ML/MIN/1.73 M^2
EST. GFR  (NON AFRICAN AMERICAN): >60 ML/MIN/1.73 M^2
GLUCOSE SERPL-MCNC: 89 MG/DL (ref 70–110)
HCT VFR BLD AUTO: 39.4 % (ref 37–48.5)
HDLC SERPL-MCNC: 65 MG/DL (ref 40–75)
HDLC SERPL: 36.5 % (ref 20–50)
HGB BLD-MCNC: 13.6 G/DL (ref 12–16)
LDLC SERPL CALC-MCNC: 104.6 MG/DL (ref 63–159)
MCH RBC QN AUTO: 32.6 PG (ref 27–31)
MCHC RBC AUTO-ENTMCNC: 34.5 G/DL (ref 32–36)
MCV RBC AUTO: 95 FL (ref 82–98)
NONHDLC SERPL-MCNC: 113 MG/DL
PLATELET # BLD AUTO: 184 K/UL (ref 150–350)
PMV BLD AUTO: 10.3 FL (ref 9.2–12.9)
POTASSIUM SERPL-SCNC: 4.4 MMOL/L (ref 3.5–5.1)
PROT SERPL-MCNC: 7.4 G/DL (ref 6–8.4)
RBC # BLD AUTO: 4.17 M/UL (ref 4–5.4)
SODIUM SERPL-SCNC: 141 MMOL/L (ref 136–145)
TRIGL SERPL-MCNC: 42 MG/DL (ref 30–150)
TSH SERPL DL<=0.005 MIU/L-ACNC: 0.57 UIU/ML (ref 0.4–4)
WBC # BLD AUTO: 6.5 K/UL (ref 3.9–12.7)

## 2019-07-31 PROCEDURE — 99395 PREV VISIT EST AGE 18-39: CPT | Mod: S$GLB,,, | Performed by: FAMILY MEDICINE

## 2019-07-31 PROCEDURE — 80053 COMPREHEN METABOLIC PANEL: CPT

## 2019-07-31 PROCEDURE — 84443 ASSAY THYROID STIM HORMONE: CPT

## 2019-07-31 PROCEDURE — 80061 LIPID PANEL: CPT

## 2019-07-31 PROCEDURE — 99395 PR PREVENTIVE VISIT,EST,18-39: ICD-10-PCS | Mod: S$GLB,,, | Performed by: FAMILY MEDICINE

## 2019-07-31 PROCEDURE — 85027 COMPLETE CBC AUTOMATED: CPT

## 2019-07-31 PROCEDURE — 36415 COLL VENOUS BLD VENIPUNCTURE: CPT

## 2019-07-31 PROCEDURE — 99999 PR PBB SHADOW E&M-EST. PATIENT-LVL III: ICD-10-PCS | Mod: PBBFAC,,, | Performed by: FAMILY MEDICINE

## 2019-07-31 PROCEDURE — 99999 PR PBB SHADOW E&M-EST. PATIENT-LVL III: CPT | Mod: PBBFAC,,, | Performed by: FAMILY MEDICINE

## 2019-07-31 NOTE — PROGRESS NOTES
Subjective:       Patient ID: Christie Bay is a 34 y.o. female.    Chief Complaint: Annual Exam    Established patient for an annual wellness check/physical exam and also chronic disease management. Specific complaints - see dictation and please see ROS.  P, S, Fm, Soc Hx's; Meds, allergies reviewed and reconciled.  Health maintenance file reviewed and addressed items due.    Review of Systems   Constitutional: Negative for chills, fatigue, fever and unexpected weight change.   HENT: Negative for congestion and trouble swallowing.    Eyes: Negative for redness and visual disturbance.   Respiratory: Negative for cough, chest tightness and shortness of breath.    Cardiovascular: Negative for chest pain, palpitations and leg swelling.   Gastrointestinal: Negative for abdominal pain and blood in stool.   Genitourinary: Negative for difficulty urinating, hematuria and menstrual problem.   Musculoskeletal: Negative for arthralgias, back pain, gait problem, joint swelling, myalgias and neck pain.   Skin: Negative for color change and rash.   Neurological: Positive for headaches. Negative for tremors, speech difficulty, weakness and numbness.   Hematological: Negative for adenopathy. Does not bruise/bleed easily.   Psychiatric/Behavioral: Negative for behavioral problems, confusion and sleep disturbance. The patient is not nervous/anxious.        Objective:      Physical Exam   Constitutional: She is oriented to person, place, and time. She appears well-developed and well-nourished.   HENT:   Head: Normocephalic.   Right Ear: Tympanic membrane, external ear and ear canal normal.   Left Ear: Tympanic membrane, external ear and ear canal normal.   Mouth/Throat: Oropharynx is clear and moist.   Eyes: Pupils are equal, round, and reactive to light. No scleral icterus.   Neck: Normal range of motion. Neck supple. Carotid bruit is not present. No thyromegaly present.   Cardiovascular: Normal rate, regular  rhythm, normal heart sounds and intact distal pulses. Exam reveals no gallop and no friction rub.   No murmur heard.  Pulmonary/Chest: Effort normal and breath sounds normal. She exhibits no tenderness.   Abdominal: Soft. She exhibits no distension and no mass. There is no tenderness. There is no rebound and no guarding.   Musculoskeletal: Normal range of motion. She exhibits no edema or tenderness.   Lymphadenopathy:     She has no cervical adenopathy.   Neurological: She is alert and oriented to person, place, and time. She has normal strength. She displays normal reflexes. No cranial nerve deficit or sensory deficit. Coordination and gait normal.   Skin: Skin is warm and dry. No rash noted.   Psychiatric: She has a normal mood and affect. Her behavior is normal. Judgment and thought content normal.   Nursing note and vitals reviewed.      Assessment:       1. Annual physical exam        Plan:     Medication List with Changes/Refills   Discontinued Medications    AZITHROMYCIN (ZITHROMAX Z-SONIA) 250 MG TABLET    Take 2 tablets (500 mg) on  Day 1,  followed by 1 tablet (250 mg) once daily on Days 2 through 5.    FLUTICASONE (FLONASE) 50 MCG/ACTUATION NASAL SPRAY    2 sprays (100 mcg total) by Each Nare route once daily.    MELOXICAM (MOBIC) 15 MG TABLET    Take 1 tablet (15 mg total) by mouth once daily.    ONDANSETRON (ZOFRAN-ODT) 4 MG TBDL    Take 1 tablet (4 mg total) by mouth every 6 (six) hours as needed (nausea).     Christie was seen today for annual exam.    Diagnoses and all orders for this visit:    Annual physical exam  -     CBC Without Differential; Future  -     Comprehensive metabolic panel; Future  -     Lipid panel; Future      See meds, orders, follow up, routing and instructions sections of encounter.  A 34-year-old established female patient, annual physical examination.    Headaches, menstrual cycle.  No aura.  Takes BC powders with relief.  She is   attempting to get pregnant.    RECOMMENDATIONS:   Since she is trying to get pregnant, not a good candidate for   triptans and would also limit aspirin.  She is aware of this and has already   discussed with her OB/GYN.    There are no focal neurologic deficits at this time.  Continue current   management.  Check laboratory today.      SHANIQUE  dd: 07/31/2019 16:38:29 (CDT)  td: 08/01/2019 09:00:16 (CDT)  Doc ID   #[id number]  Job ID #[id number]    CC:

## 2019-08-05 ENCOUNTER — TELEPHONE (OUTPATIENT)
Dept: PHARMACY | Facility: CLINIC | Age: 34
End: 2019-08-05

## 2019-08-05 NOTE — TELEPHONE ENCOUNTER
Informed Patient  that [facility] received prescription for Pregnyl and benefits investigation is required.  [facility] will be back in touch once insurance determination is received.

## 2019-08-06 NOTE — TELEPHONE ENCOUNTER
Patient informed of Pregnyl 10,000 units approval ($52.82 x1 vial).  Confirmed 2 patient identifiers - Name and .  She plans on picking up Pregnyl on 19.  Counseled on administration directions - Dilute 1mL of sterile water and injection 1mL (10,000 units) into the muscle for one dose as directed.  Supplies will be included.  She has injection training scheduled at MD office on 19.  Side effects reviewed - injection site reaction, mood changes, HA, bloating and N/V.  Advised to store medication at room temperature.  Comorbidities reviewed.  Medication list and allergies reviewed - No DDIs.   All questions answered and addressed to patients satisfaction. Advised to call [facility] or provider if any issues arise.

## 2019-08-22 ENCOUNTER — OFFICE VISIT (OUTPATIENT)
Dept: OPTOMETRY | Facility: CLINIC | Age: 34
End: 2019-08-22
Payer: COMMERCIAL

## 2019-08-22 DIAGNOSIS — H52.13 MYOPIA, BILATERAL: Primary | ICD-10-CM

## 2019-08-22 DIAGNOSIS — Z46.0 FITTING AND ADJUSTMENT OF SPECTACLES AND CONTACT LENSES: Primary | ICD-10-CM

## 2019-08-22 PROCEDURE — 92014 PR EYE EXAM, EST PATIENT,COMPREHESV: ICD-10-PCS | Mod: S$GLB,,, | Performed by: OPTOMETRIST

## 2019-08-22 PROCEDURE — 92014 COMPRE OPH EXAM EST PT 1/>: CPT | Mod: S$GLB,,, | Performed by: OPTOMETRIST

## 2019-08-22 PROCEDURE — 99999 PR PBB SHADOW E&M-EST. PATIENT-LVL I: ICD-10-PCS | Mod: PBBFAC,,, | Performed by: OPTOMETRIST

## 2019-08-22 PROCEDURE — 92015 PR REFRACTION: ICD-10-PCS | Mod: S$GLB,,, | Performed by: OPTOMETRIST

## 2019-08-22 PROCEDURE — 99999 PR PBB SHADOW E&M-EST. PATIENT-LVL II: ICD-10-PCS | Mod: PBBFAC,,, | Performed by: OPTOMETRIST

## 2019-08-22 PROCEDURE — 92015 DETERMINE REFRACTIVE STATE: CPT | Mod: S$GLB,,, | Performed by: OPTOMETRIST

## 2019-08-22 PROCEDURE — 99999 PR PBB SHADOW E&M-EST. PATIENT-LVL II: CPT | Mod: PBBFAC,,, | Performed by: OPTOMETRIST

## 2019-08-22 PROCEDURE — 99999 PR PBB SHADOW E&M-EST. PATIENT-LVL I: CPT | Mod: PBBFAC,,, | Performed by: OPTOMETRIST

## 2019-08-22 PROCEDURE — 92310 CONTACT LENS FITTING OU: CPT | Mod: CSM,,, | Performed by: OPTOMETRIST

## 2019-08-22 PROCEDURE — 92310 PR CONTACT LENS FITTING (NO CHANGE): ICD-10-PCS | Mod: CSM,,, | Performed by: OPTOMETRIST

## 2019-08-22 NOTE — PROGRESS NOTES
HPI     Last eye exam was 6/20/17 with Dr Esparza.    Pt here for annual/ cl fit  Sometimes has fuzzy va OD distance  Patient denies diplopia, headaches, flashes/floaters, pain, and   itching/burning/tearing.    Pt does not use any eye drops.        Last edited by Selene Valles on 8/22/2019 10:06 AM. (History)            Assessment /Plan     For exam results, see Encounter Report.    Myopia, bilateral        Chorioretinal scar, bilateral        Rx specs  CL fit today: good fit, vision and comfort OU              Changed Power OU              OK to order if happy                Discussed RD precautions and discussed yearly DFE to monitor     RTC 1 year

## 2019-08-30 ENCOUNTER — TELEPHONE (OUTPATIENT)
Dept: PHARMACY | Facility: CLINIC | Age: 34
End: 2019-08-30

## 2019-08-30 NOTE — TELEPHONE ENCOUNTER
Informed Patient  that [facility] received prescription for Pregnyl & Gonal and benefits investigation is required.  [facility] will be back in touch once insurance determination is received.

## 2019-09-03 NOTE — TELEPHONE ENCOUNTER
Patient informed of medication approvals - Pregnyl ($52.82 x1 vial) and Gonal ($377.74 x1 pen). She would like to  Pregnyl on 9/4/19. Patient is treatment experienced so consultation was declined. She is still comparing prices for Gonal with local pharmacies and plans on reaching out to [facility] if Gonal is needed. All questions answered and addressed to patients satisfaction. Pt verbalized understanding.

## 2019-10-01 ENCOUNTER — TELEPHONE (OUTPATIENT)
Dept: PHARMACY | Facility: CLINIC | Age: 34
End: 2019-10-01

## 2019-10-03 ENCOUNTER — TELEPHONE (OUTPATIENT)
Dept: PHARMACY | Facility: CLINIC | Age: 34
End: 2019-10-03

## 2019-10-03 NOTE — TELEPHONE ENCOUNTER
Informed Patient  that [facility] received prescription for Endometrin and benefits investigation is required.  [facility] will be back in touch once insurance determination is received.

## 2019-10-03 NOTE — TELEPHONE ENCOUNTER
Patient informed that Endometrin covered through her pharmacy benefits for $184.37, reduced to $154.37 with copay savings card (covers $30 every 2 weeks). She declines consultation and will  the medication today, 10/3/19, before we close at 7pm. She also understands to call [facility] if refills are needed

## 2019-12-18 ENCOUNTER — OFFICE VISIT (OUTPATIENT)
Dept: OBSTETRICS AND GYNECOLOGY | Facility: CLINIC | Age: 34
End: 2019-12-18
Payer: COMMERCIAL

## 2019-12-18 ENCOUNTER — PATIENT MESSAGE (OUTPATIENT)
Dept: OBSTETRICS AND GYNECOLOGY | Facility: CLINIC | Age: 34
End: 2019-12-18

## 2019-12-18 VITALS
SYSTOLIC BLOOD PRESSURE: 112 MMHG | BODY MASS INDEX: 24.41 KG/M2 | DIASTOLIC BLOOD PRESSURE: 64 MMHG | HEIGHT: 66 IN | WEIGHT: 151.88 LBS

## 2019-12-18 DIAGNOSIS — Z34.01 ENCOUNTER FOR SUPERVISION OF NORMAL FIRST PREGNANCY IN FIRST TRIMESTER: Primary | ICD-10-CM

## 2019-12-18 PROCEDURE — 99999 PR PBB SHADOW E&M-EST. PATIENT-LVL III: CPT | Mod: PBBFAC,,, | Performed by: OBSTETRICS & GYNECOLOGY

## 2019-12-18 PROCEDURE — 99999 PR PBB SHADOW E&M-EST. PATIENT-LVL III: ICD-10-PCS | Mod: PBBFAC,,, | Performed by: OBSTETRICS & GYNECOLOGY

## 2019-12-18 PROCEDURE — 0500F INITIAL PRENATAL CARE VISIT: CPT | Mod: S$GLB,,, | Performed by: OBSTETRICS & GYNECOLOGY

## 2019-12-18 PROCEDURE — 0500F PR INITIAL PRENATAL CARE VISIT: ICD-10-PCS | Mod: S$GLB,,, | Performed by: OBSTETRICS & GYNECOLOGY

## 2019-12-18 RX ORDER — LETROZOLE 2.5 MG/1
TABLET, FILM COATED ORAL
Refills: 0 | COMMUNITY
Start: 2019-09-26 | End: 2020-05-01

## 2019-12-18 NOTE — PROGRESS NOTES
New OB    SUBJECTIVE:     Chief Complaint: Initial Prenatal Visit       History of Present Illness:  Pt is a 34 y.o. female who presents with a new pregnancy.  She is 12w4d based on fertility dating.  Patient had IUI and letrozole.  She does not have vaginal bleeding/spotting, does not have abdominal pain,  does not have nausea, does not have vomiting.     Mat 21- neg, boy    Significant History:   Migraines- tylenol while pregnant    Last pap: 2017    Review of patient's allergies indicates:   Allergen Reactions    Codeine Anaphylaxis    Demerol [meperidine] Anaphylaxis    Morphine Anaphylaxis       Past Medical History:   Diagnosis Date    History of shingles     Migraine headache     Ulcer      Past Surgical History:   Procedure Laterality Date    CHOLECYSTECTOMY      KNEE SURGERY Left     SHOULDER SURGERY Right      OB History        1    Para   0    Term   0       0    AB   0    Living   0       SAB   0    TAB   0    Ectopic   0    Multiple   0    Live Births                   Family History   Problem Relation Age of Onset    Migraines Mother     Skin cancer Mother     Cancer Mother         skin    Melanoma Mother     Hypertension Father     Diabetes Maternal Grandmother     Breast cancer Maternal Grandmother     Cancer Maternal Grandmother         breast    Brain cancer Paternal Grandfather     Cancer Paternal Grandfather         brain    Ovarian cancer Neg Hx     Colon cancer Neg Hx     Eclampsia Neg Hx     Miscarriages / Stillbirths Neg Hx      labor Neg Hx     Stroke Neg Hx      Social History     Tobacco Use    Smoking status: Never Smoker    Smokeless tobacco: Never Used   Substance Use Topics    Alcohol use: Not Currently     Comment: 2 per month.    Drug use: Yes       Current Outpatient Medications   Medication Sig    acetaminophen (TYLENOL) 100 mg/mL suspension Take by mouth every 4 (four) hours as needed for Temperature greater than.    prenatal vit  calc,iron,folic (PRENATAL VITAMIN ORAL) Take by mouth.    chorionic gonadotropin, human (PREGNYL) 10,000 unit injection Mix with 1 ml sterile water into powder and administer 1 ml (10,000 units) into the muscle for one dose as directed. (Patient not taking: Reported on 12/18/2019)    follitropin kiara (GONAL-F RFF REDI-JECT) 300/0.5 unit/mL PnIj Inject 75 Units into the skin daily as directed. (Patient not taking: Reported on 12/18/2019)    letrozole (FEMARA) 2.5 mg Tab TAKE 2 TABLETS BY MOUTH ONCE DAILY FOR 5 DAYS    progesterone micronized (ENDOMETRIN) 100 mg vaginal insert Place 1 tablet (100 mg total) vaginally 2 (two) times daily. (Patient not taking: Reported on 12/18/2019)     No current facility-administered medications for this visit.        Review of Systems:  Review of Systems   Constitutional: Negative for fever and unexpected weight change.   Respiratory: Negative for shortness of breath.    Cardiovascular: Negative for chest pain.   Gastrointestinal: Negative for constipation, diarrhea, nausea and vomiting.   Genitourinary: Negative for dysuria, frequency, menstrual problem, pelvic pain, urgency, vaginal bleeding, vaginal discharge and vaginal pain.   Psychiatric/Behavioral: The patient is not nervous/anxious.         OBJECTIVE:     Physical Exam:  Physical Exam   Constitutional: She is oriented to person, place, and time. She appears well-developed and well-nourished.   Pulmonary/Chest: Effort normal.   Neurological: She is oriented to person, place, and time.   Skin: No pallor.   Psychiatric: She has a normal mood and affect. Her behavior is normal. Judgment and thought content normal.   Nursing note and vitals reviewed.      Chaperoned by: Marino    ASSESSMENT:       ICD-10-CM ICD-9-CM    1. Encounter for supervision of normal first pregnancy in first trimester Z34.01 V22.0           Plan:      Christie was seen today for initial prenatal visit.    Diagnoses and all orders for this  visit:    Encounter for supervision of normal first pregnancy in first trimester        No orders of the defined types were placed in this encounter.      Secondary Amenorrhea:  - UPT: positive in the clinic  - Dating U/S:ordered/scheduled  - First trimester screening: completed  - First trimester labs: waiting on results from [facility]  - s/p flu shot    Migraines:  - given headache regimen of riboflavin/mag oxide    Counseling: Patient was counseled today on routine 1st trimester precautions, including vaginal bleeding and abdominal pain. We also discussed proper weight gain based on the [employer]'s recommendations based on her pre-pregnancy weight, foods to avoid in pregnancy (i.e. sushi, fish that are high in mercury, cold deli meat, and unpasteurized cheeses), environmental precautions such as cat litter, and prenatal vitamin options (i.e. stool softener, DHA).  Patient given A to Z handbook of pregnancy.    Follow up in about 1 month (around 1/18/2020) for ob check.    Lashawn Mendez

## 2020-01-06 ENCOUNTER — PATIENT MESSAGE (OUTPATIENT)
Dept: OBSTETRICS AND GYNECOLOGY | Facility: CLINIC | Age: 35
End: 2020-01-06

## 2020-01-15 ENCOUNTER — ROUTINE PRENATAL (OUTPATIENT)
Dept: OBSTETRICS AND GYNECOLOGY | Facility: CLINIC | Age: 35
End: 2020-01-15
Payer: COMMERCIAL

## 2020-01-15 ENCOUNTER — LAB VISIT (OUTPATIENT)
Dept: LAB | Facility: OTHER | Age: 35
End: 2020-01-15
Attending: OBSTETRICS & GYNECOLOGY
Payer: COMMERCIAL

## 2020-01-15 VITALS
BODY MASS INDEX: 25.37 KG/M2 | WEIGHT: 157.19 LBS | DIASTOLIC BLOOD PRESSURE: 72 MMHG | SYSTOLIC BLOOD PRESSURE: 104 MMHG

## 2020-01-15 DIAGNOSIS — Z34.02 ENCOUNTER FOR SUPERVISION OF NORMAL FIRST PREGNANCY IN SECOND TRIMESTER: ICD-10-CM

## 2020-01-15 LAB
ABO + RH BLD: NORMAL
BASOPHILS # BLD AUTO: 0.04 K/UL (ref 0–0.2)
BASOPHILS NFR BLD: 0.4 % (ref 0–1.9)
BLD GP AB SCN CELLS X3 SERPL QL: NORMAL
DIFFERENTIAL METHOD: ABNORMAL
EOSINOPHIL # BLD AUTO: 0.1 K/UL (ref 0–0.5)
EOSINOPHIL NFR BLD: 0.7 % (ref 0–8)
ERYTHROCYTE [DISTWIDTH] IN BLOOD BY AUTOMATED COUNT: 11.8 % (ref 11.5–14.5)
HCT VFR BLD AUTO: 34.1 % (ref 37–48.5)
HGB BLD-MCNC: 11.4 G/DL (ref 12–16)
IMM GRANULOCYTES # BLD AUTO: 0.06 K/UL (ref 0–0.04)
IMM GRANULOCYTES NFR BLD AUTO: 0.6 % (ref 0–0.5)
LYMPHOCYTES # BLD AUTO: 1.4 K/UL (ref 1–4.8)
LYMPHOCYTES NFR BLD: 14.8 % (ref 18–48)
MCH RBC QN AUTO: 31.9 PG (ref 27–31)
MCHC RBC AUTO-ENTMCNC: 33.4 G/DL (ref 32–36)
MCV RBC AUTO: 96 FL (ref 82–98)
MONOCYTES # BLD AUTO: 0.6 K/UL (ref 0.3–1)
MONOCYTES NFR BLD: 6 % (ref 4–15)
NEUTROPHILS # BLD AUTO: 7.4 K/UL (ref 1.8–7.7)
NEUTROPHILS NFR BLD: 77.5 % (ref 38–73)
NRBC BLD-RTO: 0 /100 WBC
PLATELET # BLD AUTO: 236 K/UL (ref 150–350)
PMV BLD AUTO: 10.4 FL (ref 9.2–12.9)
RBC # BLD AUTO: 3.57 M/UL (ref 4–5.4)
RPR SER QL: NORMAL
WBC # BLD AUTO: 9.57 K/UL (ref 3.9–12.7)

## 2020-01-15 PROCEDURE — 99999 PR PBB SHADOW E&M-EST. PATIENT-LVL III: ICD-10-PCS | Mod: PBBFAC,,, | Performed by: OBSTETRICS & GYNECOLOGY

## 2020-01-15 PROCEDURE — 87340 HEPATITIS B SURFACE AG IA: CPT

## 2020-01-15 PROCEDURE — 86703 HIV-1/HIV-2 1 RESULT ANTBDY: CPT

## 2020-01-15 PROCEDURE — 86762 RUBELLA ANTIBODY: CPT

## 2020-01-15 PROCEDURE — 0502F SUBSEQUENT PRENATAL CARE: CPT | Mod: CPTII,S$GLB,, | Performed by: OBSTETRICS & GYNECOLOGY

## 2020-01-15 PROCEDURE — 82105 ALPHA-FETOPROTEIN SERUM: CPT

## 2020-01-15 PROCEDURE — 99999 PR PBB SHADOW E&M-EST. PATIENT-LVL III: CPT | Mod: PBBFAC,,, | Performed by: OBSTETRICS & GYNECOLOGY

## 2020-01-15 PROCEDURE — 86900 BLOOD TYPING SEROLOGIC ABO: CPT

## 2020-01-15 PROCEDURE — 86592 SYPHILIS TEST NON-TREP QUAL: CPT

## 2020-01-15 PROCEDURE — 85025 COMPLETE CBC W/AUTO DIFF WBC: CPT

## 2020-01-15 PROCEDURE — 0502F PR SUBSEQUENT PRENATAL CARE: ICD-10-PCS | Mod: CPTII,S$GLB,, | Performed by: OBSTETRICS & GYNECOLOGY

## 2020-01-15 PROCEDURE — 36415 COLL VENOUS BLD VENIPUNCTURE: CPT

## 2020-01-15 PROCEDURE — 87491 CHLMYD TRACH DNA AMP PROBE: CPT

## 2020-01-15 NOTE — PROGRESS NOTES
No bleeding or cramping.  No pain, nausea or vomiting.  Doing well so far.  S/p flu shot.  MSAFP and remaining labs ordered today. Ultrasound ordered.  Declined conn mom.

## 2020-01-16 LAB
C TRACH DNA SPEC QL NAA+PROBE: NOT DETECTED
HBV SURFACE AG SERPL QL IA: NEGATIVE
HIV 1+2 AB+HIV1 P24 AG SERPL QL IA: NEGATIVE
N GONORRHOEA DNA SPEC QL NAA+PROBE: NOT DETECTED
RUBV IGG SER-ACNC: 26 IU/ML
RUBV IGG SER-IMP: REACTIVE

## 2020-01-17 LAB
# FETUSES US: NORMAL
AFP INTERPRETATION: NORMAL
AFP MOM SERPL: 0.88
AFP SERPL-MCNC: 31.6 NG/ML
AFP SERPL-MCNC: NEGATIVE NG/ML
AGE AT DELIVERY: 34
GA (DAYS): 4 D
GA (WEEKS): 16 WK
GESTATIONAL AGE METHOD: NORMAL
IDDM PATIENT QL: NORMAL
SMOKING STATUS FTND: NO

## 2020-02-04 ENCOUNTER — PROCEDURE VISIT (OUTPATIENT)
Dept: MATERNAL FETAL MEDICINE | Facility: CLINIC | Age: 35
End: 2020-02-04
Payer: COMMERCIAL

## 2020-02-04 DIAGNOSIS — Z36.89 ENCOUNTER FOR FETAL ANATOMIC SURVEY: ICD-10-CM

## 2020-02-04 DIAGNOSIS — Z34.02 ENCOUNTER FOR SUPERVISION OF NORMAL FIRST PREGNANCY IN SECOND TRIMESTER: ICD-10-CM

## 2020-02-04 PROCEDURE — 76805 PR US, OB 14+WKS, TRANSABD, SINGLE GESTATION: ICD-10-PCS | Mod: S$GLB,,, | Performed by: OBSTETRICS & GYNECOLOGY

## 2020-02-04 PROCEDURE — 76805 OB US >/= 14 WKS SNGL FETUS: CPT | Mod: S$GLB,,, | Performed by: OBSTETRICS & GYNECOLOGY

## 2020-02-18 ENCOUNTER — PATIENT MESSAGE (OUTPATIENT)
Dept: OBSTETRICS AND GYNECOLOGY | Facility: CLINIC | Age: 35
End: 2020-02-18

## 2020-02-18 ENCOUNTER — ROUTINE PRENATAL (OUTPATIENT)
Dept: OBSTETRICS AND GYNECOLOGY | Facility: CLINIC | Age: 35
End: 2020-02-18
Payer: COMMERCIAL

## 2020-02-18 VITALS
WEIGHT: 165.56 LBS | BODY MASS INDEX: 26.72 KG/M2 | DIASTOLIC BLOOD PRESSURE: 68 MMHG | SYSTOLIC BLOOD PRESSURE: 118 MMHG

## 2020-02-18 DIAGNOSIS — Z34.02 ENCOUNTER FOR SUPERVISION OF NORMAL FIRST PREGNANCY IN SECOND TRIMESTER: Primary | ICD-10-CM

## 2020-02-18 PROCEDURE — 99999 PR PBB SHADOW E&M-EST. PATIENT-LVL III: ICD-10-PCS | Mod: PBBFAC,,, | Performed by: OBSTETRICS & GYNECOLOGY

## 2020-02-18 PROCEDURE — 99999 PR PBB SHADOW E&M-EST. PATIENT-LVL III: CPT | Mod: PBBFAC,,, | Performed by: OBSTETRICS & GYNECOLOGY

## 2020-02-18 PROCEDURE — 0502F SUBSEQUENT PRENATAL CARE: CPT | Mod: CPTII,S$GLB,, | Performed by: OBSTETRICS & GYNECOLOGY

## 2020-02-18 PROCEDURE — 0502F PR SUBSEQUENT PRENATAL CARE: ICD-10-PCS | Mod: CPTII,S$GLB,, | Performed by: OBSTETRICS & GYNECOLOGY

## 2020-03-02 ENCOUNTER — HOSPITAL ENCOUNTER (EMERGENCY)
Facility: OTHER | Age: 35
Discharge: HOME | End: 2020-03-02
Attending: OBSTETRICS & GYNECOLOGY
Payer: COMMERCIAL

## 2020-03-02 ENCOUNTER — PATIENT MESSAGE (OUTPATIENT)
Dept: OBSTETRICS AND GYNECOLOGY | Facility: CLINIC | Age: 35
End: 2020-03-02

## 2020-03-02 VITALS
SYSTOLIC BLOOD PRESSURE: 91 MMHG | OXYGEN SATURATION: 100 % | TEMPERATURE: 98 F | HEART RATE: 65 BPM | DIASTOLIC BLOOD PRESSURE: 50 MMHG

## 2020-03-02 DIAGNOSIS — N94.9 ROUND LIGAMENT PAIN: ICD-10-CM

## 2020-03-02 DIAGNOSIS — Z3A.23 23 WEEKS GESTATION OF PREGNANCY: ICD-10-CM

## 2020-03-02 DIAGNOSIS — R10.31 RLQ ABDOMINAL PAIN: Primary | ICD-10-CM

## 2020-03-02 LAB
ALBUMIN SERPL BCP-MCNC: 3.3 G/DL (ref 3.5–5.2)
ALP SERPL-CCNC: 72 U/L (ref 55–135)
ALT SERPL W/O P-5'-P-CCNC: 34 U/L (ref 10–44)
ANION GAP SERPL CALC-SCNC: 10 MMOL/L (ref 8–16)
AST SERPL-CCNC: 26 U/L (ref 10–40)
BASOPHILS # BLD AUTO: 0.03 K/UL (ref 0–0.2)
BASOPHILS NFR BLD: 0.3 % (ref 0–1.9)
BILIRUB SERPL-MCNC: 0.6 MG/DL (ref 0.1–1)
BILIRUB UR QL STRIP: NEGATIVE
BUN SERPL-MCNC: 8 MG/DL (ref 6–20)
CALCIUM SERPL-MCNC: 8.9 MG/DL (ref 8.7–10.5)
CHLORIDE SERPL-SCNC: 107 MMOL/L (ref 95–110)
CLARITY UR: ABNORMAL
CO2 SERPL-SCNC: 23 MMOL/L (ref 23–29)
COLOR UR: YELLOW
CREAT SERPL-MCNC: 0.6 MG/DL (ref 0.5–1.4)
DIFFERENTIAL METHOD: ABNORMAL
EOSINOPHIL # BLD AUTO: 0.1 K/UL (ref 0–0.5)
EOSINOPHIL NFR BLD: 0.7 % (ref 0–8)
ERYTHROCYTE [DISTWIDTH] IN BLOOD BY AUTOMATED COUNT: 12.5 % (ref 11.5–14.5)
EST. GFR  (AFRICAN AMERICAN): >60 ML/MIN/1.73 M^2
EST. GFR  (NON AFRICAN AMERICAN): >60 ML/MIN/1.73 M^2
GLUCOSE SERPL-MCNC: 70 MG/DL (ref 70–110)
GLUCOSE UR QL STRIP: NEGATIVE
HCT VFR BLD AUTO: 32.3 % (ref 37–48.5)
HGB BLD-MCNC: 10.6 G/DL (ref 12–16)
HGB UR QL STRIP: NEGATIVE
IMM GRANULOCYTES # BLD AUTO: 0.08 K/UL (ref 0–0.04)
IMM GRANULOCYTES NFR BLD AUTO: 0.8 % (ref 0–0.5)
KETONES UR QL STRIP: NEGATIVE
LEUKOCYTE ESTERASE UR QL STRIP: NEGATIVE
LYMPHOCYTES # BLD AUTO: 1.3 K/UL (ref 1–4.8)
LYMPHOCYTES NFR BLD: 12.2 % (ref 18–48)
MCH RBC QN AUTO: 32.2 PG (ref 27–31)
MCHC RBC AUTO-ENTMCNC: 32.8 G/DL (ref 32–36)
MCV RBC AUTO: 98 FL (ref 82–98)
MONOCYTES # BLD AUTO: 0.6 K/UL (ref 0.3–1)
MONOCYTES NFR BLD: 5.9 % (ref 4–15)
NEUTROPHILS # BLD AUTO: 8.4 K/UL (ref 1.8–7.7)
NEUTROPHILS NFR BLD: 80.1 % (ref 38–73)
NITRITE UR QL STRIP: NEGATIVE
NRBC BLD-RTO: 0 /100 WBC
PH UR STRIP: 8.5 [PH] (ref 5–8)
PLATELET # BLD AUTO: 242 K/UL (ref 150–350)
PMV BLD AUTO: 10.6 FL (ref 9.2–12.9)
POTASSIUM SERPL-SCNC: 3.7 MMOL/L (ref 3.5–5.1)
PROT SERPL-MCNC: 6.5 G/DL (ref 6–8.4)
PROT UR QL STRIP: NEGATIVE
RBC # BLD AUTO: 3.29 M/UL (ref 4–5.4)
SODIUM SERPL-SCNC: 140 MMOL/L (ref 136–145)
SP GR UR STRIP: 1.01 (ref 1–1.03)
URN SPEC COLLECT METH UR: ABNORMAL
UROBILINOGEN UR STRIP-ACNC: NEGATIVE EU/DL
WBC # BLD AUTO: 10.52 K/UL (ref 3.9–12.7)

## 2020-03-02 PROCEDURE — 85025 COMPLETE CBC W/AUTO DIFF WBC: CPT

## 2020-03-02 PROCEDURE — 25000003 PHARM REV CODE 250: Performed by: STUDENT IN AN ORGANIZED HEALTH CARE EDUCATION/TRAINING PROGRAM

## 2020-03-02 PROCEDURE — 59025 FETAL NON-STRESS TEST: CPT | Mod: 26,,, | Performed by: OBSTETRICS & GYNECOLOGY

## 2020-03-02 PROCEDURE — 80053 COMPREHEN METABOLIC PANEL: CPT

## 2020-03-02 PROCEDURE — 99284 PR EMERGENCY DEPT VISIT,LEVEL IV: ICD-10-PCS | Mod: 25,,, | Performed by: OBSTETRICS & GYNECOLOGY

## 2020-03-02 PROCEDURE — 99284 EMERGENCY DEPT VISIT MOD MDM: CPT | Mod: 25,,, | Performed by: OBSTETRICS & GYNECOLOGY

## 2020-03-02 PROCEDURE — 59025 PR FETAL 2N-STRESS TEST: ICD-10-PCS | Mod: 26,,, | Performed by: OBSTETRICS & GYNECOLOGY

## 2020-03-02 PROCEDURE — 81003 URINALYSIS AUTO W/O SCOPE: CPT

## 2020-03-02 PROCEDURE — 99284 EMERGENCY DEPT VISIT MOD MDM: CPT | Mod: 25

## 2020-03-02 RX ORDER — ACETAMINOPHEN 500 MG
1000 TABLET ORAL
Status: COMPLETED | OUTPATIENT
Start: 2020-03-02 | End: 2020-03-02

## 2020-03-02 RX ADMIN — ACETAMINOPHEN 1000 MG: 500 TABLET ORAL at 09:03

## 2020-03-02 NOTE — DISCHARGE INSTRUCTIONS
Pt will follow up in the clinic  Pt will return to [facility] for an severe pain, nausea , vomiting, chills or fever.

## 2020-03-02 NOTE — ED NOTES
Pt came to the [facility] with complaints of right sided lower abdominal pain.  Pt states the pain is consistent but worse at times than others.  Pt is afebrile,  FHT'S obtained 145-150

## 2020-03-02 NOTE — ED PROVIDER NOTES
Encounter Date: 3/2/2020       History   CC: RLQ pain    Christie Bay is a 34 y.o. [id number] at 23w2d presents complaining of right lower quadrant pain. Reports that this pain started this morning and is worse with movement and with sitting/standing. Pain radiates to her back only with movement. Reports that pain was worse with urination but denies any hematuria or burning with urination. Denies any vaginal bleeding. Denies any changes to bowel habits. Reports good appetite; denies any nausea or vomiting. Denies any fever or sick contacts. Reports a history of cholecystectomy. Does report a recent history of round ligament pain.    This IUP is complicated by history of migraines.  Patient denies contractions, denies vaginal bleeding, denies LOF.        Review of patient's allergies indicates:   Allergen Reactions    Codeine Anaphylaxis    Demerol [meperidine] Anaphylaxis    Morphine Anaphylaxis     Past Medical History:   Diagnosis Date    History of shingles     Migraine headache     Ulcer      Past Surgical History:   Procedure Laterality Date    CHOLECYSTECTOMY      KNEE SURGERY Left     SHOULDER SURGERY Right      Family History   Problem Relation Age of Onset    Migraines Mother     Skin cancer Mother     Cancer Mother         skin    Melanoma Mother     Hypertension Father     Diabetes Maternal Grandmother     Breast cancer Maternal Grandmother     Cancer Maternal Grandmother         breast    Brain cancer Paternal Grandfather     Cancer Paternal Grandfather         brain    Ovarian cancer Neg Hx     Colon cancer Neg Hx     Eclampsia Neg Hx     Miscarriages / Stillbirths Neg Hx      labor Neg Hx     Stroke Neg Hx      Social History     Tobacco Use    Smoking status: Never Smoker    Smokeless tobacco: Never Used   Substance Use Topics    Alcohol use: Not Currently     Comment: 2 per month.    Drug use: Yes     Review of Systems   Constitutional: Negative for  chills, fatigue and fever.   HENT: Negative for congestion.    Eyes: Negative for visual disturbance.   Respiratory: Negative for shortness of breath.    Cardiovascular: Negative for chest pain.   Gastrointestinal: Positive for abdominal pain (RLQ pain). Negative for nausea and vomiting.   Genitourinary: Negative for dysuria, vaginal bleeding and vaginal discharge.   Musculoskeletal: Positive for back pain (R-sided, low back pain).   Skin: Negative for rash.   Neurological: Negative for headaches.       Physical Exam     Initial Vitals   BP Pulse Resp Temp SpO2   03/02/20 0810 03/02/20 0810 -- 03/02/20 0820 03/02/20 0810   111/65 74  97.8 °F (36.6 °C) 100 %      MAP       --                Physical Exam    Vitals reviewed.  Constitutional: She appears well-developed and well-nourished. No distress.   HENT:   Head: Normocephalic and atraumatic.   Eyes: EOM are normal.   Neck: Normal range of motion.   Cardiovascular: Normal rate.   Pulmonary/Chest: Breath sounds normal.   Abdominal: Soft. There is no tenderness. There is no rebound and no guarding.   Gravid uterus  Minimal TTP in RLQ, non-acute abdominal exam   Genitourinary:   Genitourinary Comments: No CVA tenderness bilaterally   Musculoskeletal: Normal range of motion. She exhibits no edema.   Neurological: She is alert and oriented to person, place, and time.   Skin: Skin is warm and dry.   Psychiatric: She has a normal mood and affect. Thought content normal.         ED Course   Procedures  Labs Reviewed   URINALYSIS, REFLEX TO URINE CULTURE - Abnormal; Notable for the following components:       Result Value    Appearance, UA Hazy (*)     All other components within normal limits    Narrative:     Preferred Collection Type->Urine, Clean Catch   CBC W/ AUTO DIFFERENTIAL - Abnormal; Notable for the following components:    RBC 3.29 (*)     Hemoglobin 10.6 (*)     Hematocrit 32.3 (*)     Mean Corpuscular Hemoglobin 32.2 (*)     Immature Granulocytes 0.8 (*)      Gran # (ANC) 8.4 (*)     Immature Grans (Abs) 0.08 (*)     Gran% 80.1 (*)     Lymph% 12.2 (*)     All other components within normal limits   COMPREHENSIVE METABOLIC PANEL - Abnormal; Notable for the following components:    Albumin 3.3 (*)     All other components within normal limits          Imaging Results           US Retroperitoneal Complete (Final result)  Result time 03/02/20 11:34:43   Procedure changed from US Retroperitoneal Limited     Final result by Ilya Dahl DO (03/02/20 11:34:43)                 Impression:      Mild left hydronephrosis which may be related to gravid status and intermittent obstruction with ureteral jets identified bilaterally.    No evidence for left-sided hydronephrosis.    Clinical correlation advised      Electronically signed by: Ilya Dahl DO  Date:    03/02/2020  Time:    11:34             Narrative:    EXAMINATION:  US RETROPERITONEAL COMPLETE    CLINICAL HISTORY:  rlq pain; Right lower quadrant pain 23 weeks pregnant.    TECHNIQUE:  Ultrasound of the kidneys and urinary bladder was performed including color flow and Doppler evaluation of the kidneys.    COMPARISON:  None.    FINDINGS:  Right kidney: The kidney measures 10.9 in length by 4.4 x 5.9 cm in transverse dimensions.  There is mild right hydronephrosis without evidence for nephrolithiasis..    The resistive index measures 0.77    Left kidney: The kidney measures 11.4 in length by 5.2 x 4.0 cm in transverse dimensions.   No hydronephrosis or nephrolithiasis.    The resistive index measures 0.61    Spot imaging urinary bladder without definite bladder wall thickening.  Ureteral jets are identified bilaterally.    This report was flagged in Epic as abnormal.                               US Abdomen Limited (Final result)  Result time 03/02/20 11:28:35    Final result by Shekhar Ly MD (03/02/20 11:28:35)                 Impression:      No sonographic abnormalities are identified, however the appendix and  right ovary were not visualized likely related to the gravid uterus and overlying bowel.      Electronically signed by: Shekhar Ly MD  Date:    2020  Time:    11:28             Narrative:    EXAMINATION:  US ABDOMEN LIMITED    CLINICAL HISTORY:  R/o appendicitis;    TECHNIQUE:  Limited ultrasound of the right upper quadrant of the abdomen (including pancreas, liver, gallbladder, common bile duct, and spleen) was performed.    COMPARISON:  None.    FINDINGS:  Focused ultrasound examination of the right lower quadrant of the abdomen was performed to evaluate for possible appendicitis versus ovarian torsion.  Note that the patient is pregnant.  No noncompressible loops of bowel are identified within the right lower quadrant to suggest acute appendicitis, however the appendix is not confidently identified.  The right ovary was also not visualized.  No free fluid is identified within the pelvis.                                 Medical Decision Making:   Initial Assessment:   Christie Bay is a 34 y.o. [id number] at 23w2d presents complaining of right lower quadrant pain.    ED Management:  - VSS, afebrile, non-tachycardic, BP WNL; no fevers,chills  - FHT confirmed in 150s  - Udip WNL at walk in clinic per patient  - UA with reflex to culture collected, no hematuria (UA WNL)  - CBC, CMP drawn  - Will obtain RLQ, retroperitoneal ultrasounds to r/o appendicitis or renal stone  - Tylenol given with pain with mild relief  - Non-acute abdominal exam  - Labs WNL, no leukocytosis (WBC 10)  - US WNL apart from mild R-sided hydronephrosis without evidence of stone  - Suspect that pain is 2/2 round ligament pain given location along RLQ in the distribution of the round ligament and the fact that pain is worse with standing and better when resting with legs elevated  - Recommend warm compresses/showers/baths  - Recommend elevating legs when seated/laying down  - Recommend extra strength Tylenol for discomfort  when standing for long periods of time  - Recommend maternity band for support of round ligaments  - RTC for regularly scheduled prenatal appointments  - Patient advised to RT [facility] should she develop N/V, F/C, or worsening pain  - Patient discharged home in stable condition    Other:   I have discussed this case with another health care provider.       <> Summary of the Discussion: Dr. Bynum              Attending Attestation:   Physician Attestation Statement for Resident:  As the supervising MD   Physician Attestation Statement: I have personally seen and examined this patient.   I agree with the above history. -:   As the supervising MD I agree with the above PE.    As the supervising MD I agree with the above treatment, course, plan, and disposition.  I was personally present during the critical portions of the procedure(s) performed by the resident and was immediately available in the ED to provide services and assistance as needed during the entire procedure.  I have reviewed and agree with the residents interpretation of the following: lab data and rhythm strips.  I have reviewed the following: old records at this facility.                    ED Course as of Mar 03 1222   Mon Mar 02, 2020   0904 I evaluated Ms. Bay and discussed plan with Dr. Toussaint.  Pt presents at 23w2d with RLQ abdominal pain.  New onset, this AM.  No fevers/chills/n/v, no hematuria/dysuria.  Has appetite.  Worse with sitting standing, 2-3/10 when lying.  No ob complaints.  VSS, afebrile  Abd exam with RLQ tenderness, no peritoneal signs, no acute abdomen  No cva ttp, fetal heart tones 150  Will r/o stone/appendicitis  Follow up labs/US    [HU]      ED Course User Index  [HU] Ena Bynum DO        Clinical Impression:       ICD-10-CM ICD-9-CM   1. RLQ abdominal pain R10.31 789.03   2. 23 weeks gestation of pregnancy Z3A.23 V22.2   3. Round ligament pain N94.9 625.9     Disposition:   Disposition: Discharged  Condition: Stable      ED Disposition Condition    Discharge Stable          Yaquelin Toussaint M.D.  OB/GYN PGY-1     Yaquelin Toussaint MD  Resident  03/02/20 1533       Ena Bynum DO  03/03/20 1223

## 2020-03-17 ENCOUNTER — PATIENT MESSAGE (OUTPATIENT)
Dept: ADMINISTRATIVE | Facility: OTHER | Age: 35
End: 2020-03-17

## 2020-03-17 ENCOUNTER — LAB VISIT (OUTPATIENT)
Dept: LAB | Facility: OTHER | Age: 35
End: 2020-03-17
Attending: OBSTETRICS & GYNECOLOGY
Payer: COMMERCIAL

## 2020-03-17 ENCOUNTER — ROUTINE PRENATAL (OUTPATIENT)
Dept: OBSTETRICS AND GYNECOLOGY | Facility: CLINIC | Age: 35
End: 2020-03-17
Payer: COMMERCIAL

## 2020-03-17 VITALS
DIASTOLIC BLOOD PRESSURE: 72 MMHG | BODY MASS INDEX: 27.01 KG/M2 | SYSTOLIC BLOOD PRESSURE: 114 MMHG | WEIGHT: 167.31 LBS

## 2020-03-17 DIAGNOSIS — Z34.02 ENCOUNTER FOR SUPERVISION OF NORMAL FIRST PREGNANCY IN SECOND TRIMESTER: ICD-10-CM

## 2020-03-17 DIAGNOSIS — Z34.02 ENCOUNTER FOR SUPERVISION OF NORMAL FIRST PREGNANCY IN SECOND TRIMESTER: Primary | ICD-10-CM

## 2020-03-17 DIAGNOSIS — O09.819 HIGH RISK PREGNANCY DUE TO ASSISTED REPRODUCTIVE TECHNOLOGY: ICD-10-CM

## 2020-03-17 LAB
BASOPHILS # BLD AUTO: 0.03 K/UL (ref 0–0.2)
BASOPHILS NFR BLD: 0.4 % (ref 0–1.9)
DIFFERENTIAL METHOD: ABNORMAL
EOSINOPHIL # BLD AUTO: 0.1 K/UL (ref 0–0.5)
EOSINOPHIL NFR BLD: 0.6 % (ref 0–8)
ERYTHROCYTE [DISTWIDTH] IN BLOOD BY AUTOMATED COUNT: 12.3 % (ref 11.5–14.5)
GLUCOSE SERPL-MCNC: 108 MG/DL (ref 70–140)
HCT VFR BLD AUTO: 31.9 % (ref 37–48.5)
HGB BLD-MCNC: 10.6 G/DL (ref 12–16)
IMM GRANULOCYTES # BLD AUTO: 0.04 K/UL (ref 0–0.04)
IMM GRANULOCYTES NFR BLD AUTO: 0.5 % (ref 0–0.5)
LYMPHOCYTES # BLD AUTO: 1.1 K/UL (ref 1–4.8)
LYMPHOCYTES NFR BLD: 13.4 % (ref 18–48)
MCH RBC QN AUTO: 32.8 PG (ref 27–31)
MCHC RBC AUTO-ENTMCNC: 33.2 G/DL (ref 32–36)
MCV RBC AUTO: 99 FL (ref 82–98)
MONOCYTES # BLD AUTO: 0.5 K/UL (ref 0.3–1)
MONOCYTES NFR BLD: 6.4 % (ref 4–15)
NEUTROPHILS # BLD AUTO: 6.6 K/UL (ref 1.8–7.7)
NEUTROPHILS NFR BLD: 78.7 % (ref 38–73)
NRBC BLD-RTO: 0 /100 WBC
PLATELET # BLD AUTO: 209 K/UL (ref 150–350)
PMV BLD AUTO: 10.3 FL (ref 9.2–12.9)
RBC # BLD AUTO: 3.23 M/UL (ref 4–5.4)
WBC # BLD AUTO: 8.43 K/UL (ref 3.9–12.7)

## 2020-03-17 PROCEDURE — 0502F SUBSEQUENT PRENATAL CARE: CPT | Mod: CPTII,S$GLB,, | Performed by: OBSTETRICS & GYNECOLOGY

## 2020-03-17 PROCEDURE — 85025 COMPLETE CBC W/AUTO DIFF WBC: CPT

## 2020-03-17 PROCEDURE — 0502F PR SUBSEQUENT PRENATAL CARE: ICD-10-PCS | Mod: CPTII,S$GLB,, | Performed by: OBSTETRICS & GYNECOLOGY

## 2020-03-17 PROCEDURE — 36415 COLL VENOUS BLD VENIPUNCTURE: CPT

## 2020-03-17 PROCEDURE — 99999 PR PBB SHADOW E&M-EST. PATIENT-LVL III: ICD-10-PCS | Mod: PBBFAC,,, | Performed by: OBSTETRICS & GYNECOLOGY

## 2020-03-17 PROCEDURE — 99999 PR PBB SHADOW E&M-EST. PATIENT-LVL III: CPT | Mod: PBBFAC,,, | Performed by: OBSTETRICS & GYNECOLOGY

## 2020-03-17 PROCEDURE — 82950 GLUCOSE TEST: CPT

## 2020-03-17 NOTE — PROGRESS NOTES
Good fetal movement.  No contractions, no vaginal bleeding, and no loss of fluid.  Ob glucose screening today.  Discussed safety measures to prevent COVID.  Signed up for connected mom.

## 2020-04-05 ENCOUNTER — PATIENT MESSAGE (OUTPATIENT)
Dept: OBSTETRICS AND GYNECOLOGY | Facility: CLINIC | Age: 35
End: 2020-04-05

## 2020-04-14 ENCOUNTER — OFFICE VISIT (OUTPATIENT)
Dept: OBSTETRICS AND GYNECOLOGY | Facility: CLINIC | Age: 35
End: 2020-04-14
Payer: COMMERCIAL

## 2020-04-14 ENCOUNTER — PATIENT MESSAGE (OUTPATIENT)
Dept: OBSTETRICS AND GYNECOLOGY | Facility: CLINIC | Age: 35
End: 2020-04-14

## 2020-04-14 DIAGNOSIS — Z34.02 ENCOUNTER FOR SUPERVISION OF NORMAL FIRST PREGNANCY IN SECOND TRIMESTER: Primary | ICD-10-CM

## 2020-04-14 PROCEDURE — 0502F SUBSEQUENT PRENATAL CARE: CPT | Mod: CPTII,,, | Performed by: OBSTETRICS & GYNECOLOGY

## 2020-04-14 PROCEDURE — 0502F PR SUBSEQUENT PRENATAL CARE: ICD-10-PCS | Mod: CPTII,,, | Performed by: OBSTETRICS & GYNECOLOGY

## 2020-04-15 ENCOUNTER — PATIENT MESSAGE (OUTPATIENT)
Dept: OBSTETRICS AND GYNECOLOGY | Facility: CLINIC | Age: 35
End: 2020-04-15

## 2020-04-15 DIAGNOSIS — R39.9 LOWER URINARY TRACT SYMPTOMS: Primary | ICD-10-CM

## 2020-04-16 ENCOUNTER — PATIENT MESSAGE (OUTPATIENT)
Dept: OBSTETRICS AND GYNECOLOGY | Facility: CLINIC | Age: 35
End: 2020-04-16

## 2020-04-16 ENCOUNTER — NURSE TRIAGE (OUTPATIENT)
Dept: ADMINISTRATIVE | Facility: CLINIC | Age: 35
End: 2020-04-16

## 2020-04-16 NOTE — TELEPHONE ENCOUNTER
OB COVID-19 TRIAGE NOTE    Christie Bay is a 35 y.o. female   at 29w5d in an uncomplicated pregnancy complaining of diarrhea and nausea.  She has had diarrhea for the past 24 hours, but denies having diarrhea for the past few hours. She denies any bloody stool or abdominal pain.  She has had some nausea but is able to keep down fluids.    Pt has had on and off headaches since Saturday which are gone now. They improved with Tylenol.  She denies current chest pain, cough, SOB, chills, and fever.  She says her symptoms have been improving.  Reported temperature of 97.8F by non-contact thermometer yesterday morning.  Pt is not aware of any  potential COVID-19 exposure..  Patient reports fetal movement and denies contractions, loss of fluids and vaginal bleeding.      Assessment:     Patient Active Problem List   Diagnosis    Migraine headache    Encounter for supervision of normal first pregnancy in second trimester    ART- had IUI and letrozole     Cold symptoms         Plan:   1. (Low risk) Patient advised to carry out symptomatic management at home and to call back if symptoms persist or worsen.          Michelle Crump  MSY3    Reason for Disposition   MILD-MODERATE diarrhea (e.g., 1-6 times / day more than normal)    Additional Information   Negative: Shock suspected (e.g., cold/pale/clammy skin, too weak to stand, low BP, rapid pulse)   Negative: Difficult to awaken or acting confused (e.g., disoriented, slurred speech)   Negative: Sounds like a life-threatening emergency to the triager   Negative: Vomiting also present and worse than the diarrhea   Negative: Blood in stool and without diarrhea   Negative: SEVERE abdominal pain (e.g., excruciating) and present > 1 hour   Negative: SEVERE abdominal pain and age > 60   Negative: Bloody, black, or tarry bowel movements   Negative: SEVERE diarrhea (e.g., 7 or more times / day more than normal) and age > 60 years   Negative:  Constant abdominal pain lasting > 2 hours   Negative: Drinking very little and has signs of dehydration (e.g., no urine > 12 hours, very dry mouth, very lightheaded)   Negative: Patient sounds very sick or weak to the triager   Negative: SEVERE diarrhea (e.g., 7 or more times / day more than normal) and present > 24 hours (1 day)   Negative: MODERATE diarrhea (e.g., 4-6 times / day more than normal) and present > 48 hours (2 days)   Negative: MODERATE diarrhea (e.g., 4-6 times / day more than normal) and age > 70 years   Negative: Abdominal pain  (Exception: pain clears completely with each passage of diarrhea stool)   Negative: Fever > 101 F (38.3 C)   Negative: Blood in the stool   Negative: Mucus or pus in stool has been present > 2 days and diarrhea is more than mild   Negative: Weak immune system (e.g., HIV positive, cancer chemo, splenectomy, organ transplant, chronic steroids)   Negative: Travel to a foreign country in past month   Negative: Recent antibiotic therapy (i.e., within last 2 months) and diarrhea present > 3 days since antibiotic was stopped   Negative: Recent hospitalization and diarrhea present > 3 days   Negative: Tube feedings (e.g., nasogastric, g-tube, j-tube)   Negative: MILD diarrhea (e.g., 1-3 or more stools than normal in past 24 hours) diarrhea without known cause and present > 7 days   Negative: Patient wants to be seen   Negative: Diarrhea is a chronic symptom (recurrent or ongoing AND lasting > 4 weeks)   Negative: SEVERE diarrhea (e.g., 7 or more times / day more than normal)    Protocols used: DIARRHEA-A-OH

## 2020-04-17 ENCOUNTER — LAB VISIT (OUTPATIENT)
Dept: LAB | Facility: OTHER | Age: 35
End: 2020-04-17
Payer: COMMERCIAL

## 2020-04-17 DIAGNOSIS — R39.9 LOWER URINARY TRACT SYMPTOMS: ICD-10-CM

## 2020-04-17 PROCEDURE — 87086 URINE CULTURE/COLONY COUNT: CPT

## 2020-04-19 ENCOUNTER — PATIENT MESSAGE (OUTPATIENT)
Dept: OBSTETRICS AND GYNECOLOGY | Facility: CLINIC | Age: 35
End: 2020-04-19

## 2020-04-19 LAB — BACTERIA UR CULT: NORMAL

## 2020-04-21 DIAGNOSIS — Z01.84 ANTIBODY RESPONSE EXAMINATION: ICD-10-CM

## 2020-04-24 ENCOUNTER — LAB VISIT (OUTPATIENT)
Dept: LAB | Facility: HOSPITAL | Age: 35
End: 2020-04-24
Attending: INTERNAL MEDICINE
Payer: COMMERCIAL

## 2020-04-24 DIAGNOSIS — Z01.84 ANTIBODY RESPONSE EXAMINATION: ICD-10-CM

## 2020-04-24 LAB — SARS-COV-2 IGG SERPL QL IA: NEGATIVE

## 2020-04-24 PROCEDURE — 36415 COLL VENOUS BLD VENIPUNCTURE: CPT

## 2020-04-24 PROCEDURE — 86769 SARS-COV-2 COVID-19 ANTIBODY: CPT

## 2020-05-01 ENCOUNTER — IMMUNIZATION (OUTPATIENT)
Dept: PHARMACY | Facility: CLINIC | Age: 35
End: 2020-05-01
Payer: COMMERCIAL

## 2020-05-01 ENCOUNTER — PATIENT MESSAGE (OUTPATIENT)
Dept: OBSTETRICS AND GYNECOLOGY | Facility: CLINIC | Age: 35
End: 2020-05-01

## 2020-05-01 ENCOUNTER — ROUTINE PRENATAL (OUTPATIENT)
Dept: OBSTETRICS AND GYNECOLOGY | Facility: CLINIC | Age: 35
End: 2020-05-01
Payer: COMMERCIAL

## 2020-05-01 ENCOUNTER — HOSPITAL ENCOUNTER (OUTPATIENT)
Dept: PERINATAL CARE | Facility: OTHER | Age: 35
Discharge: HOME | End: 2020-05-01
Attending: OBSTETRICS & GYNECOLOGY
Payer: COMMERCIAL

## 2020-05-01 VITALS — SYSTOLIC BLOOD PRESSURE: 110 MMHG | BODY MASS INDEX: 28.32 KG/M2 | DIASTOLIC BLOOD PRESSURE: 56 MMHG | WEIGHT: 175.5 LBS

## 2020-05-01 DIAGNOSIS — O36.8390 FETAL HEART RATE/RHYTHM ABNORMALITY, ANTEPARTUM: ICD-10-CM

## 2020-05-01 DIAGNOSIS — Z34.02 ENCOUNTER FOR SUPERVISION OF NORMAL FIRST PREGNANCY IN SECOND TRIMESTER: Primary | ICD-10-CM

## 2020-05-01 PROCEDURE — 59025 FETAL NON-STRESS TEST: CPT | Mod: 26,,, | Performed by: OBSTETRICS & GYNECOLOGY

## 2020-05-01 PROCEDURE — 99999 PR PBB SHADOW E&M-EST. PATIENT-LVL III: ICD-10-PCS | Mod: PBBFAC,,, | Performed by: OBSTETRICS & GYNECOLOGY

## 2020-05-01 PROCEDURE — 76815 PR  US,PREGNANT UTERUS,LIMITED, 1/> FETUSES: ICD-10-PCS | Mod: 26,,, | Performed by: OBSTETRICS & GYNECOLOGY

## 2020-05-01 PROCEDURE — 0502F PR SUBSEQUENT PRENATAL CARE: ICD-10-PCS | Mod: CPTII,S$GLB,, | Performed by: OBSTETRICS & GYNECOLOGY

## 2020-05-01 PROCEDURE — 59025 FETAL NON-STRESS TEST: CPT

## 2020-05-01 PROCEDURE — 76815 OB US LIMITED FETUS(S): CPT

## 2020-05-01 PROCEDURE — 99999 PR PBB SHADOW E&M-EST. PATIENT-LVL III: CPT | Mod: PBBFAC,,, | Performed by: OBSTETRICS & GYNECOLOGY

## 2020-05-01 PROCEDURE — 76815 OB US LIMITED FETUS(S): CPT | Mod: 26,,, | Performed by: OBSTETRICS & GYNECOLOGY

## 2020-05-01 PROCEDURE — 0502F SUBSEQUENT PRENATAL CARE: CPT | Mod: CPTII,S$GLB,, | Performed by: OBSTETRICS & GYNECOLOGY

## 2020-05-01 PROCEDURE — 59025 PR FETAL 2N-STRESS TEST: ICD-10-PCS | Mod: 26,,, | Performed by: OBSTETRICS & GYNECOLOGY

## 2020-05-01 NOTE — PROGRESS NOTES
Good fetal movement.  No contractions, no vaginal bleeding, and no loss of fluid.  FHT in the 90's with doppler, then returned to and stayed at 140's. Suspect a variable deceleration, however, will send to PNT.

## 2020-05-11 ENCOUNTER — PATIENT MESSAGE (OUTPATIENT)
Dept: OBSTETRICS AND GYNECOLOGY | Facility: CLINIC | Age: 35
End: 2020-05-11

## 2020-05-14 ENCOUNTER — ROUTINE PRENATAL (OUTPATIENT)
Dept: OBSTETRICS AND GYNECOLOGY | Facility: CLINIC | Age: 35
End: 2020-05-14
Payer: COMMERCIAL

## 2020-05-14 VITALS — WEIGHT: 176.38 LBS | DIASTOLIC BLOOD PRESSURE: 68 MMHG | BODY MASS INDEX: 28.47 KG/M2 | SYSTOLIC BLOOD PRESSURE: 98 MMHG

## 2020-05-14 DIAGNOSIS — Z34.02 ENCOUNTER FOR SUPERVISION OF NORMAL FIRST PREGNANCY IN SECOND TRIMESTER: Primary | ICD-10-CM

## 2020-05-14 PROCEDURE — 99999 PR PBB SHADOW E&M-EST. PATIENT-LVL III: ICD-10-PCS | Mod: PBBFAC,,, | Performed by: OBSTETRICS & GYNECOLOGY

## 2020-05-14 PROCEDURE — 0502F SUBSEQUENT PRENATAL CARE: CPT | Mod: CPTII,S$GLB,, | Performed by: OBSTETRICS & GYNECOLOGY

## 2020-05-14 PROCEDURE — 0502F PR SUBSEQUENT PRENATAL CARE: ICD-10-PCS | Mod: CPTII,S$GLB,, | Performed by: OBSTETRICS & GYNECOLOGY

## 2020-05-14 PROCEDURE — 99999 PR PBB SHADOW E&M-EST. PATIENT-LVL III: CPT | Mod: PBBFAC,,, | Performed by: OBSTETRICS & GYNECOLOGY

## 2020-05-14 NOTE — PROGRESS NOTES
Good fetal movement.  No contractions, no vaginal bleeding, and no loss of fluid.    Does have rectal bleeding from hemorrhoids- has had this in the past.  Recommended prep H steroid suppository.

## 2020-06-04 ENCOUNTER — ROUTINE PRENATAL (OUTPATIENT)
Dept: OBSTETRICS AND GYNECOLOGY | Facility: CLINIC | Age: 35
End: 2020-06-04
Payer: COMMERCIAL

## 2020-06-04 VITALS
WEIGHT: 183.88 LBS | DIASTOLIC BLOOD PRESSURE: 64 MMHG | BODY MASS INDEX: 29.68 KG/M2 | SYSTOLIC BLOOD PRESSURE: 110 MMHG

## 2020-06-04 DIAGNOSIS — Z34.02 ENCOUNTER FOR SUPERVISION OF NORMAL FIRST PREGNANCY IN SECOND TRIMESTER: Primary | ICD-10-CM

## 2020-06-04 PROCEDURE — 99999 PR PBB SHADOW E&M-EST. PATIENT-LVL III: ICD-10-PCS | Mod: PBBFAC,,, | Performed by: OBSTETRICS & GYNECOLOGY

## 2020-06-04 PROCEDURE — 0502F PR SUBSEQUENT PRENATAL CARE: ICD-10-PCS | Mod: CPTII,S$GLB,, | Performed by: OBSTETRICS & GYNECOLOGY

## 2020-06-04 PROCEDURE — 87081 CULTURE SCREEN ONLY: CPT

## 2020-06-04 PROCEDURE — 0502F SUBSEQUENT PRENATAL CARE: CPT | Mod: CPTII,S$GLB,, | Performed by: OBSTETRICS & GYNECOLOGY

## 2020-06-04 PROCEDURE — 99999 PR PBB SHADOW E&M-EST. PATIENT-LVL III: CPT | Mod: PBBFAC,,, | Performed by: OBSTETRICS & GYNECOLOGY

## 2020-06-04 NOTE — PROGRESS NOTES
Good fetal movement.  No contractions, no vaginal bleeding, and no loss of fluid.  GBS and t3 labs today.

## 2020-06-05 ENCOUNTER — OFFICE VISIT (OUTPATIENT)
Dept: SPORTS MEDICINE | Facility: CLINIC | Age: 35
End: 2020-06-05
Payer: COMMERCIAL

## 2020-06-05 VITALS
SYSTOLIC BLOOD PRESSURE: 121 MMHG | WEIGHT: 183 LBS | BODY MASS INDEX: 29.41 KG/M2 | DIASTOLIC BLOOD PRESSURE: 78 MMHG | HEART RATE: 102 BPM | HEIGHT: 66 IN

## 2020-06-05 DIAGNOSIS — M99.08 SOMATIC DYSFUNCTION OF RIB CAGE REGION: ICD-10-CM

## 2020-06-05 DIAGNOSIS — M99.02 SOMATIC DYSFUNCTION OF THORACIC REGION: ICD-10-CM

## 2020-06-05 DIAGNOSIS — M54.50 LOW BACK PAIN DURING PREGNANCY IN THIRD TRIMESTER: Primary | ICD-10-CM

## 2020-06-05 DIAGNOSIS — M25.551 BILATERAL HIP PAIN: ICD-10-CM

## 2020-06-05 DIAGNOSIS — M25.552 BILATERAL HIP PAIN: ICD-10-CM

## 2020-06-05 DIAGNOSIS — M99.05 SOMATIC DYSFUNCTION OF PELVIC REGION: ICD-10-CM

## 2020-06-05 DIAGNOSIS — M99.01 CERVICAL (NECK) REGION SOMATIC DYSFUNCTION: ICD-10-CM

## 2020-06-05 DIAGNOSIS — M99.04 SACRAL REGION SOMATIC DYSFUNCTION: ICD-10-CM

## 2020-06-05 DIAGNOSIS — O26.893 LOW BACK PAIN DURING PREGNANCY IN THIRD TRIMESTER: Primary | ICD-10-CM

## 2020-06-05 DIAGNOSIS — M79.10 MYALGIA: ICD-10-CM

## 2020-06-05 DIAGNOSIS — M99.03 SOMATIC DYSFUNCTION OF LUMBAR REGION: ICD-10-CM

## 2020-06-05 DIAGNOSIS — M99.09 SOMATIC DYSFUNCTION OF ABDOMINAL REGION: ICD-10-CM

## 2020-06-05 PROCEDURE — 99213 OFFICE O/P EST LOW 20 MIN: CPT | Mod: 25,S$GLB,, | Performed by: NEUROMUSCULOSKELETAL MEDICINE & OMM

## 2020-06-05 PROCEDURE — 98928 OSTEOPATH MANJ 7-8 REGIONS: CPT | Mod: S$GLB,,, | Performed by: NEUROMUSCULOSKELETAL MEDICINE & OMM

## 2020-06-05 PROCEDURE — 97110 PR THERAPEUTIC EXERCISES: ICD-10-PCS | Mod: GP,S$GLB,, | Performed by: NEUROMUSCULOSKELETAL MEDICINE & OMM

## 2020-06-05 PROCEDURE — 3008F BODY MASS INDEX DOCD: CPT | Mod: CPTII,S$GLB,, | Performed by: NEUROMUSCULOSKELETAL MEDICINE & OMM

## 2020-06-05 PROCEDURE — 3008F PR BODY MASS INDEX (BMI) DOCUMENTED: ICD-10-PCS | Mod: CPTII,S$GLB,, | Performed by: NEUROMUSCULOSKELETAL MEDICINE & OMM

## 2020-06-05 PROCEDURE — 99213 PR OFFICE/OUTPT VISIT, EST, LEVL III, 20-29 MIN: ICD-10-PCS | Mod: 25,S$GLB,, | Performed by: NEUROMUSCULOSKELETAL MEDICINE & OMM

## 2020-06-05 PROCEDURE — 97110 THERAPEUTIC EXERCISES: CPT | Mod: GP,S$GLB,, | Performed by: NEUROMUSCULOSKELETAL MEDICINE & OMM

## 2020-06-05 PROCEDURE — 99999 PR PBB SHADOW E&M-EST. PATIENT-LVL III: ICD-10-PCS | Mod: PBBFAC,,, | Performed by: NEUROMUSCULOSKELETAL MEDICINE & OMM

## 2020-06-05 PROCEDURE — 99999 PR PBB SHADOW E&M-EST. PATIENT-LVL III: CPT | Mod: PBBFAC,,, | Performed by: NEUROMUSCULOSKELETAL MEDICINE & OMM

## 2020-06-05 PROCEDURE — 98928 PR OSTEOPATHIC MANIP,7-8 BODY REGN: ICD-10-PCS | Mod: S$GLB,,, | Performed by: NEUROMUSCULOSKELETAL MEDICINE & OMM

## 2020-06-05 NOTE — PROGRESS NOTES
Subjective:     Christie Bay    Chief Complaint   Patient presents with    Left Hip - Pain    Right Hip - Pain    Back Pain       HPI    Christie is coming in today for low back and bilateral hip pain that began about 9-10 week(s) ago. Pt is 36w6d pregnant. Pt. describes the pain as a 3/10 achy pain that does not radiate. There was not a fall/injury/ or trauma associated with the onset of symptoms.  Patient wears a belly band support during the day and sleeps with a pillow between her legs at night, which helps some.  The pain is better with rest, stretching and worse with activity. Pt. Denies any other musculoskeletal complaints at this time.     Review of Systems   Constitutional: Negative for chills and fever.   HENT: Negative for hearing loss and tinnitus.    Eyes: Negative for blurred vision and photophobia.   Respiratory: Negative for cough and shortness of breath.    Cardiovascular: Negative for chest pain and leg swelling.   Gastrointestinal: Negative for abdominal pain, heartburn, nausea and vomiting.   Genitourinary: Negative for dysuria and hematuria.   Musculoskeletal: Positive for back pain, joint pain and myalgias. Negative for falls and neck pain.   Skin: Negative for rash.   Neurological: Negative for dizziness, tingling, focal weakness, weakness and headaches.   Endo/Heme/Allergies: Negative for environmental allergies. Does not bruise/bleed easily.   Psychiatric/Behavioral: Negative for depression. The patient is not nervous/anxious.          PAST MEDICAL HISTORY:   Past Medical History:   Diagnosis Date    History of shingles     Migraine headache     Ulcer      PAST SURGICAL HISTORY:   Past Surgical History:   Procedure Laterality Date    CHOLECYSTECTOMY      KNEE SURGERY Left     SHOULDER SURGERY Right      FAMILY HISTORY:   Family History   Problem Relation Age of Onset    Migraines Mother     Skin cancer Mother     Cancer Mother         skin    Melanoma Mother      Hypertension Father     Diabetes Maternal Grandmother     Breast cancer Maternal Grandmother     Cancer Maternal Grandmother         breast    Brain cancer Paternal Grandfather     Cancer Paternal Grandfather         brain    Ovarian cancer Neg Hx     Colon cancer Neg Hx     Eclampsia Neg Hx     Miscarriages / Stillbirths Neg Hx      labor Neg Hx     Stroke Neg Hx      SOCIAL HISTORY:   Social History     Socioeconomic History    Marital status:      Spouse name: Not on file    Number of children: 0    Years of education: Not on file    Highest education level: Not on file   Occupational History    Occupation: ATC     Employer: [employer] ([employer])   Social Needs    Financial resource strain: Not on file    Food insecurity:     Worry: Not on file     Inability: Not on file    Transportation needs:     Medical: Not on file     Non-medical: Not on file   Tobacco Use    Smoking status: Never Smoker    Smokeless tobacco: Never Used   Substance and Sexual Activity    Alcohol use: Not Currently     Comment: 2 per month.    Drug use: Yes    Sexual activity: Yes   Lifestyle    Physical activity:     Days per week: Not on file     Minutes per session: Not on file    Stress: Not on file   Relationships    Social connections:     Talks on phone: Not on file     Gets together: Not on file     Attends Bahai service: Not on file     Active member of club or organization: Not on file     Attends meetings of clubs or organizations: Not on file     Relationship status: Not on file   Other Topics Concern    Are you pregnant or think you may be? Not Asked    Breast-feeding Not Asked   Social History Narrative    Not on file       MEDICATIONS:   Current Outpatient Medications:     acetaminophen (TYLENOL) 100 mg/mL suspension, Take by mouth every 4 (four) hours as needed for Temperature greater than., Disp: , Rfl:     prenatal vit calc,iron,folic (PRENATAL VITAMIN ORAL),  "Take by mouth., Disp: , Rfl:     progesterone micronized (ENDOMETRIN) 100 mg vaginal insert, Place 1 tablet (100 mg total) vaginally 2 (two) times daily., Disp: 30 each, Rfl: 4  ALLERGIES:   Review of patient's allergies indicates:   Allergen Reactions    Codeine Anaphylaxis    Demerol [meperidine] Anaphylaxis    Morphine Anaphylaxis         Objective:     VITAL SIGNS: /78   Pulse 102   Ht 5' 6" (1.676 m)   Wt 83 kg (183 lb)   LMP 09/21/2019   BMI 29.54 kg/m²    General    Nursing note and vitals reviewed.  Constitutional: She is oriented to person, place, and time. She appears well-developed and well-nourished.   HENT:   Head: Normocephalic and atraumatic.   no nasal discharge, no external ear redness or discharge   Eyes:   EOM is full and smooth  No eye redness or discharge   Neck: Neck supple. No tracheal deviation present.   Cardiovascular: Normal rate.    2+ Radial pulse bilaterally  2+ Dorsalis Pedis pulse bilaterally  No LE edema appreciated   Pulmonary/Chest: Effort normal. No respiratory distress.   Abdominal: She exhibits no distension.   No rigidity   Neurological: She is alert and oriented to person, place, and time. She exhibits normal muscle tone. Coordination normal.   See details below   Psychiatric: She has a normal mood and affect. Her behavior is normal.                 MUSCULOSKELETAL EXAM:     Lumbar Spine: right lumbar region    Observation:    Posture:  Anerior pelvic tilt with increased lumbar lordosis 2/2 gravid state  No obvious pelvic obliquity while standing.    No edema, erythema, or ecchymosis noted in lumbosacral region.    No midline skin abnormalities.    No atrophy of lower limb musculature.  Leg lengths symmetric.  Gait: shortened, widened gait with Neutral ankle mechanics and Neutral medial arch.     Tenderness:  +tenderness throughout the right lumbar spine, iliolumbar region, posterior pelvis.  No tenderness over the sacrum, piriformis, greater/lesser " trochanters.  No bony deformities or step-offs palpated.     Range of Motion:  Active flexion to 50°* (improved following OMT)   Active extension to 25°.   Active rotation to 30° on left and 30° on right.  Active sidebending to 25° on left and 25° on right.   Passive hip flexion to 135° on left and 135° on right* (improved following OMT)   Passive hip internal rotation to 45° on left and 45° on right.   Passive hip external rotation to 45° on left and 45° on right.     Strength Testing (* = with pain):  Hip flexion - 5/5 on left and 5/5 on right  Hip extension - 5/5 on left and 5/5 on right  Knee flexion - 5/5 on left and 5/5 on right  Knee extension - 5/5 on left and 5/5 on right  Dorsiflexion - 5/5 on left and 5/5 on right  Plantarflexion - 5/5 on left and 5/5 on right  Great toe extension - 5/5 on left and 5/5 on right    Special Tests:  Stork test - negative on left and negative on right    Seated straight leg raise - negative on left and negative on right  Supine straight leg raise - negative on left and negative on right   Slump test - negative on left and negative on right  Provocation maneuvers exhibit no worsening of symptoms.    ELIZABETH test - negative  FADIR test - negative    Structural Exam:  TART (Tissue texture abnormality, Asymmetry,  Restriction of motion and/or Tenderness) changes:     Cervical Spine   C1 Neutral   C2 Neutral   C3 Neutral   C4 Neutral   C5 Neutral   C6 Neutral   C7 FRS LEFT      Thoracic Spine   T1 FRS RIGHT   T2 Neutral   T3 Neutral   T4 Neutral   T5 Neutral   T6 Neutral   T7 Neutral   T8 Neutral   T9 Neutral   T10 NS-left,R-right   T11 NS-left,R-right   T12 NS-left,R-right     Rib cage: R2 external torsion on right    Abdomen: left hemidiaphragm restriction     Lumbar Spine   L1 NS-left,R-right   L2 Neutral   L3 Neutral   L4 FRS RIGHT   L5 FRS RIGHT       Pelvis:  · Innominate:Right anterior rotation  · Pubic bone:Right inferior pubic shear   · Right round ligament  TTA    Sacrum:Left on Right sacral torsion, right ischial rectal fossa TTA    Key   F= Flexed   E = Extended   R = Rotated   S = Sidebent   TTA = tissue texture abnormality       Neurovascular Exam:  Reflexes +2/4 and symmetric at the L4 and S1 dermatomes.    Sensation intact to light touch in the L2-S2 dermatomes bilaterally.   No pretibial edema or abnormal hair pattern of the shin.    Intact and symmetric DP and PT pulses bilaterally.      Assessment:      Encounter Diagnoses   Name Primary?    Low back pain during pregnancy in third trimester Yes    Bilateral hip pain     Myalgia     Cervical (neck) region somatic dysfunction     Somatic dysfunction of thoracic region     Somatic dysfunction of rib cage region     Somatic dysfunction of lumbar region     Sacral region somatic dysfunction     Somatic dysfunction of pelvic region     Somatic dysfunction of abdominal region           Plan:      1.  Mechanical low back pain and bilateral lateral hip pain in 3rd trimester pregnancy.  Patient currently 36w6d pregnant.   - OMT performed today to address associated biomechanical restrictions  and HEP started.   - recommend continued prenatal sling wear for increased support  - patient given recommendations for over-the-counter belly band for postpartum abdominal support  - Continue activity as tolerated  - Recommend discontinuing kegal exercises until following delivery  - Recommend Ice/heat  up to 20 minutes at a time prn for pain control    2. OMT 7-8 regions. Oral consent obtained.  Reviewed benefits and potential side effects.   - OMT indicated today due to signs and symptoms as well as local and remote somatic dysfunction findings and their related neurokinetic, lymphatic, fascial and/or arteriovenous body connections.   - OMT techniques used: Myofascial Release, Muscle Energy, Still's Technique and Balanced Ligamentous Tension   - Treatment was tolerated well. Improvement noted in segmental mobility  post-treatment in dysfunctional regions. There were no adverse events and no complications immediately following treatment.     3. Pt. Given the following HEP:  Start  A)  Pelvic clock exercises given to do from the 6-12 o'clock positions:10-15 reps, twice daily. Hand out of exercise also given.   ADD post-partum:  B) Diaphragmatic breathing exercises: 10-15 reps of inhalation and exhalation, focusing inhaling into the abdomen and lower ribs. Repeat 2-3 times a day. Handout given.   Then add:  C) Lower abdominal muscle isotonic exercises: Rotate pelvis into the 6 o'clock position and holding it to engage lower abdominal muscles. Then lift up leg, one at a time, alternating for 10-15 reps. Repeat exercises 1-2 times daily. Handout given.   Then add:  D) modified roll up exercise for 10 reps, progress up to 3 sets 1-2 times daily.  Handout given.    [address]48 HOME EXERCISE PROGRAM (HEP):  The patient was taught a homegoing physical therapy regimen as described above. The patient demonstrated understanding of the exercises and proper technique of their execution. This interaction took 15 minutes.     4. Follow-up as needed if pain deteriorates or new issue arises    5. Patient agreeable to today's plan and all questions were answered    This note is dictated using the M*Modal Fluency Direct word recognition program. There are word recognition mistakes that are occasionally missed on review.

## 2020-06-08 LAB — BACTERIA SPEC AEROBE CULT: NORMAL

## 2020-06-11 ENCOUNTER — ROUTINE PRENATAL (OUTPATIENT)
Dept: OBSTETRICS AND GYNECOLOGY | Facility: CLINIC | Age: 35
End: 2020-06-11
Payer: COMMERCIAL

## 2020-06-11 ENCOUNTER — LAB VISIT (OUTPATIENT)
Dept: LAB | Facility: OTHER | Age: 35
End: 2020-06-11
Attending: OBSTETRICS & GYNECOLOGY
Payer: COMMERCIAL

## 2020-06-11 VITALS — BODY MASS INDEX: 29.53 KG/M2 | DIASTOLIC BLOOD PRESSURE: 70 MMHG | SYSTOLIC BLOOD PRESSURE: 102 MMHG | WEIGHT: 183 LBS

## 2020-06-11 DIAGNOSIS — Z34.02 ENCOUNTER FOR SUPERVISION OF NORMAL FIRST PREGNANCY IN SECOND TRIMESTER: Primary | ICD-10-CM

## 2020-06-11 DIAGNOSIS — Z34.02 ENCOUNTER FOR SUPERVISION OF NORMAL FIRST PREGNANCY IN SECOND TRIMESTER: ICD-10-CM

## 2020-06-11 LAB
BASOPHILS # BLD AUTO: 0.03 K/UL (ref 0–0.2)
BASOPHILS NFR BLD: 0.3 % (ref 0–1.9)
DIFFERENTIAL METHOD: ABNORMAL
EOSINOPHIL # BLD AUTO: 0.1 K/UL (ref 0–0.5)
EOSINOPHIL NFR BLD: 0.5 % (ref 0–8)
ERYTHROCYTE [DISTWIDTH] IN BLOOD BY AUTOMATED COUNT: 12.3 % (ref 11.5–14.5)
HCT VFR BLD AUTO: 31.9 % (ref 37–48.5)
HGB BLD-MCNC: 11 G/DL (ref 12–16)
IMM GRANULOCYTES # BLD AUTO: 0.16 K/UL (ref 0–0.04)
IMM GRANULOCYTES NFR BLD AUTO: 1.5 % (ref 0–0.5)
LYMPHOCYTES # BLD AUTO: 1.3 K/UL (ref 1–4.8)
LYMPHOCYTES NFR BLD: 12.1 % (ref 18–48)
MCH RBC QN AUTO: 33.3 PG (ref 27–31)
MCHC RBC AUTO-ENTMCNC: 34.5 G/DL (ref 32–36)
MCV RBC AUTO: 97 FL (ref 82–98)
MONOCYTES # BLD AUTO: 0.7 K/UL (ref 0.3–1)
MONOCYTES NFR BLD: 6.2 % (ref 4–15)
NEUTROPHILS # BLD AUTO: 8.5 K/UL (ref 1.8–7.7)
NEUTROPHILS NFR BLD: 79.4 % (ref 38–73)
NRBC BLD-RTO: 0 /100 WBC
PLATELET # BLD AUTO: 204 K/UL (ref 150–350)
PMV BLD AUTO: 10.7 FL (ref 9.2–12.9)
RBC # BLD AUTO: 3.3 M/UL (ref 4–5.4)
WBC # BLD AUTO: 10.73 K/UL (ref 3.9–12.7)

## 2020-06-11 PROCEDURE — 85025 COMPLETE CBC W/AUTO DIFF WBC: CPT

## 2020-06-11 PROCEDURE — 99999 PR PBB SHADOW E&M-EST. PATIENT-LVL III: CPT | Mod: PBBFAC,,, | Performed by: OBSTETRICS & GYNECOLOGY

## 2020-06-11 PROCEDURE — 99999 PR PBB SHADOW E&M-EST. PATIENT-LVL III: ICD-10-PCS | Mod: PBBFAC,,, | Performed by: OBSTETRICS & GYNECOLOGY

## 2020-06-11 PROCEDURE — 36415 COLL VENOUS BLD VENIPUNCTURE: CPT

## 2020-06-11 PROCEDURE — 0502F SUBSEQUENT PRENATAL CARE: CPT | Mod: CPTII,S$GLB,, | Performed by: OBSTETRICS & GYNECOLOGY

## 2020-06-11 PROCEDURE — 86592 SYPHILIS TEST NON-TREP QUAL: CPT

## 2020-06-11 PROCEDURE — 86703 HIV-1/HIV-2 1 RESULT ANTBDY: CPT

## 2020-06-11 PROCEDURE — 0502F PR SUBSEQUENT PRENATAL CARE: ICD-10-PCS | Mod: CPTII,S$GLB,, | Performed by: OBSTETRICS & GYNECOLOGY

## 2020-06-11 NOTE — PROGRESS NOTES
Good fetal movement.  No contractions, no vaginal bleeding, and no loss of fluid.  Labor precautions.

## 2020-06-12 LAB
HIV 1+2 AB+HIV1 P24 AG SERPL QL IA: NEGATIVE
RPR SER QL: NORMAL

## 2020-06-16 ENCOUNTER — ROUTINE PRENATAL (OUTPATIENT)
Dept: OBSTETRICS AND GYNECOLOGY | Facility: CLINIC | Age: 35
End: 2020-06-16
Payer: COMMERCIAL

## 2020-06-16 VITALS — SYSTOLIC BLOOD PRESSURE: 110 MMHG | BODY MASS INDEX: 29.78 KG/M2 | WEIGHT: 184.5 LBS | DIASTOLIC BLOOD PRESSURE: 60 MMHG

## 2020-06-16 DIAGNOSIS — Z34.02 ENCOUNTER FOR SUPERVISION OF NORMAL FIRST PREGNANCY IN SECOND TRIMESTER: Primary | ICD-10-CM

## 2020-06-16 PROCEDURE — 0502F PR SUBSEQUENT PRENATAL CARE: ICD-10-PCS | Mod: CPTII,S$GLB,, | Performed by: OBSTETRICS & GYNECOLOGY

## 2020-06-16 PROCEDURE — 99999 PR PBB SHADOW E&M-EST. PATIENT-LVL III: CPT | Mod: PBBFAC,,, | Performed by: OBSTETRICS & GYNECOLOGY

## 2020-06-16 PROCEDURE — 99999 PR PBB SHADOW E&M-EST. PATIENT-LVL III: ICD-10-PCS | Mod: PBBFAC,,, | Performed by: OBSTETRICS & GYNECOLOGY

## 2020-06-16 PROCEDURE — 0502F SUBSEQUENT PRENATAL CARE: CPT | Mod: CPTII,S$GLB,, | Performed by: OBSTETRICS & GYNECOLOGY

## 2020-06-23 ENCOUNTER — ROUTINE PRENATAL (OUTPATIENT)
Dept: OBSTETRICS AND GYNECOLOGY | Facility: CLINIC | Age: 35
End: 2020-06-23
Payer: COMMERCIAL

## 2020-06-23 VITALS — BODY MASS INDEX: 29.53 KG/M2 | DIASTOLIC BLOOD PRESSURE: 62 MMHG | WEIGHT: 183 LBS | SYSTOLIC BLOOD PRESSURE: 124 MMHG

## 2020-06-23 DIAGNOSIS — Z3A.39 39 WEEKS GESTATION OF PREGNANCY: Primary | ICD-10-CM

## 2020-06-23 PROCEDURE — 0502F SUBSEQUENT PRENATAL CARE: CPT | Mod: CPTII,S$GLB,, | Performed by: NURSE PRACTITIONER

## 2020-06-23 PROCEDURE — 99999 PR PBB SHADOW E&M-EST. PATIENT-LVL III: ICD-10-PCS | Mod: PBBFAC,,, | Performed by: NURSE PRACTITIONER

## 2020-06-23 PROCEDURE — 0502F PR SUBSEQUENT PRENATAL CARE: ICD-10-PCS | Mod: CPTII,S$GLB,, | Performed by: NURSE PRACTITIONER

## 2020-06-23 PROCEDURE — 99999 PR PBB SHADOW E&M-EST. PATIENT-LVL III: CPT | Mod: PBBFAC,,, | Performed by: NURSE PRACTITIONER

## 2020-06-23 NOTE — PATIENT INSTRUCTIONS
LABOR AND DELIVERY PHONE NUMBER, [phone] (OPEN 24/7, LOCATED ON 6TH FLOOR OF HOSPITAL)  [address] PHONE NUMBER, [phone] (OPEN MON-FRI, 8a-5p)

## 2020-06-23 NOTE — PROGRESS NOTES
Here for routine OB appt at 39w3d, with no complaints.  Reports good FM.  Denies LOF, denies VB, denies regular contractions. BOY, wants circ. Cervix unchanged today. Still working. Reviewed location and phone number of L&D. GBS was neg.  Reviewed warning signs of Labor and Preeclampsia.  Daily FM counts reinforced.  F/U scheduled 1 week with Dr. Mendez

## 2020-07-01 ENCOUNTER — ROUTINE PRENATAL (OUTPATIENT)
Dept: OBSTETRICS AND GYNECOLOGY | Facility: CLINIC | Age: 35
End: 2020-07-01
Payer: COMMERCIAL

## 2020-07-01 VITALS
SYSTOLIC BLOOD PRESSURE: 108 MMHG | WEIGHT: 186.75 LBS | DIASTOLIC BLOOD PRESSURE: 64 MMHG | BODY MASS INDEX: 30.14 KG/M2

## 2020-07-01 DIAGNOSIS — Z34.03 ENCOUNTER FOR SUPERVISION OF NORMAL FIRST PREGNANCY IN THIRD TRIMESTER: Primary | ICD-10-CM

## 2020-07-01 PROCEDURE — 99999 PR PBB SHADOW E&M-EST. PATIENT-LVL III: CPT | Mod: PBBFAC,,, | Performed by: OBSTETRICS & GYNECOLOGY

## 2020-07-01 PROCEDURE — 99999 PR PBB SHADOW E&M-EST. PATIENT-LVL III: ICD-10-PCS | Mod: PBBFAC,,, | Performed by: OBSTETRICS & GYNECOLOGY

## 2020-07-01 PROCEDURE — 0502F PR SUBSEQUENT PRENATAL CARE: ICD-10-PCS | Mod: CPTII,S$GLB,, | Performed by: OBSTETRICS & GYNECOLOGY

## 2020-07-01 PROCEDURE — 0502F SUBSEQUENT PRENATAL CARE: CPT | Mod: CPTII,S$GLB,, | Performed by: OBSTETRICS & GYNECOLOGY

## 2020-07-04 ENCOUNTER — HOSPITAL ENCOUNTER (INPATIENT)
Facility: OTHER | Age: 35
LOS: 2 days | Discharge: HOME | End: 2020-07-06
Attending: OBSTETRICS & GYNECOLOGY | Admitting: OBSTETRICS & GYNECOLOGY
Payer: COMMERCIAL

## 2020-07-04 ENCOUNTER — ANESTHESIA (OUTPATIENT)
Dept: OBSTETRICS AND GYNECOLOGY | Facility: OTHER | Age: 35
End: 2020-07-04
Payer: COMMERCIAL

## 2020-07-04 ENCOUNTER — ANESTHESIA EVENT (OUTPATIENT)
Dept: OBSTETRICS AND GYNECOLOGY | Facility: OTHER | Age: 35
End: 2020-07-04
Payer: COMMERCIAL

## 2020-07-04 DIAGNOSIS — O48.0 41 WEEKS GESTATION OF PREGNANCY: ICD-10-CM

## 2020-07-04 DIAGNOSIS — O47.9 UTERINE CONTRACTIONS DURING PREGNANCY: ICD-10-CM

## 2020-07-04 DIAGNOSIS — Z3A.41 41 WEEKS GESTATION OF PREGNANCY: ICD-10-CM

## 2020-07-04 PROBLEM — Z34.02 ENCOUNTER FOR SUPERVISION OF NORMAL FIRST PREGNANCY IN SECOND TRIMESTER: Status: RESOLVED | Noted: 2020-01-15 | Resolved: 2020-07-04

## 2020-07-04 LAB
ABO + RH BLD: NORMAL
BASOPHILS # BLD AUTO: 0.04 K/UL (ref 0–0.2)
BASOPHILS NFR BLD: 0.3 % (ref 0–1.9)
BLD GP AB SCN CELLS X3 SERPL QL: NORMAL
DIFFERENTIAL METHOD: ABNORMAL
EOSINOPHIL # BLD AUTO: 0 K/UL (ref 0–0.5)
EOSINOPHIL NFR BLD: 0.2 % (ref 0–8)
ERYTHROCYTE [DISTWIDTH] IN BLOOD BY AUTOMATED COUNT: 12.4 % (ref 11.5–14.5)
HCT VFR BLD AUTO: 34.9 % (ref 37–48.5)
HGB BLD-MCNC: 12 G/DL (ref 12–16)
IMM GRANULOCYTES # BLD AUTO: 0.17 K/UL (ref 0–0.04)
IMM GRANULOCYTES NFR BLD AUTO: 1.1 % (ref 0–0.5)
LYMPHOCYTES # BLD AUTO: 1.1 K/UL (ref 1–4.8)
LYMPHOCYTES NFR BLD: 7.6 % (ref 18–48)
MCH RBC QN AUTO: 32.7 PG (ref 27–31)
MCHC RBC AUTO-ENTMCNC: 34.4 G/DL (ref 32–36)
MCV RBC AUTO: 95 FL (ref 82–98)
MONOCYTES # BLD AUTO: 0.7 K/UL (ref 0.3–1)
MONOCYTES NFR BLD: 4.4 % (ref 4–15)
NEUTROPHILS # BLD AUTO: 13 K/UL (ref 1.8–7.7)
NEUTROPHILS NFR BLD: 86.4 % (ref 38–73)
NRBC BLD-RTO: 0 /100 WBC
PLATELET # BLD AUTO: 208 K/UL (ref 150–350)
PMV BLD AUTO: 10.2 FL (ref 9.2–12.9)
RBC # BLD AUTO: 3.67 M/UL (ref 4–5.4)
SARS-COV-2 RDRP RESP QL NAA+PROBE: NEGATIVE
WBC # BLD AUTO: 15.07 K/UL (ref 3.9–12.7)

## 2020-07-04 PROCEDURE — 72100003 HC LABOR CARE, EA. ADDL. 8 HRS

## 2020-07-04 PROCEDURE — 72100002 HC LABOR CARE, 1ST 8 HOURS

## 2020-07-04 PROCEDURE — 25000003 PHARM REV CODE 250: Performed by: STUDENT IN AN ORGANIZED HEALTH CARE EDUCATION/TRAINING PROGRAM

## 2020-07-04 PROCEDURE — 99283 PR EMERGENCY DEPT VISIT,LEVEL III: ICD-10-PCS | Mod: 25,,, | Performed by: OBSTETRICS & GYNECOLOGY

## 2020-07-04 PROCEDURE — 27200710 HC EPIDURAL INFUSION PUMP SET: Performed by: ANESTHESIOLOGY

## 2020-07-04 PROCEDURE — 59400 PR FULL ROUT OBSTE CARE,VAGINAL DELIV: ICD-10-PCS | Mod: GB,,, | Performed by: OBSTETRICS & GYNECOLOGY

## 2020-07-04 PROCEDURE — 63600175 PHARM REV CODE 636 W HCPCS: Performed by: STUDENT IN AN ORGANIZED HEALTH CARE EDUCATION/TRAINING PROGRAM

## 2020-07-04 PROCEDURE — 51702 INSERT TEMP BLADDER CATH: CPT

## 2020-07-04 PROCEDURE — 86850 RBC ANTIBODY SCREEN: CPT

## 2020-07-04 PROCEDURE — 11000001 HC ACUTE MED/SURG PRIVATE ROOM

## 2020-07-04 PROCEDURE — 59025 FETAL NON-STRESS TEST: CPT

## 2020-07-04 PROCEDURE — 51701 INSERT BLADDER CATHETER: CPT

## 2020-07-04 PROCEDURE — C1751 CATH, INF, PER/CENT/MIDLINE: HCPCS | Performed by: ANESTHESIOLOGY

## 2020-07-04 PROCEDURE — 85025 COMPLETE CBC W/AUTO DIFF WBC: CPT

## 2020-07-04 PROCEDURE — 59400 PRA FULL ROUT OBSTE CARE,VAGINAL DELIV: ICD-10-PCS | Mod: QY,,, | Performed by: ANESTHESIOLOGY

## 2020-07-04 PROCEDURE — 59025 FETAL NON-STRESS TEST: CPT | Mod: 26,,, | Performed by: OBSTETRICS & GYNECOLOGY

## 2020-07-04 PROCEDURE — S0020 INJECTION, BUPIVICAINE HYDRO: HCPCS | Performed by: STUDENT IN AN ORGANIZED HEALTH CARE EDUCATION/TRAINING PROGRAM

## 2020-07-04 PROCEDURE — 59025 PR FETAL 2N-STRESS TEST: ICD-10-PCS | Mod: 26,,, | Performed by: OBSTETRICS & GYNECOLOGY

## 2020-07-04 PROCEDURE — 72200005 HC VAGINAL DELIVERY LEVEL II

## 2020-07-04 PROCEDURE — 59400 OBSTETRICAL CARE: CPT | Mod: GB,,, | Performed by: OBSTETRICS & GYNECOLOGY

## 2020-07-04 PROCEDURE — 59400 OBSTETRICAL CARE: CPT | Mod: QY,,, | Performed by: ANESTHESIOLOGY

## 2020-07-04 PROCEDURE — 99285 EMERGENCY DEPT VISIT HI MDM: CPT | Mod: 25

## 2020-07-04 PROCEDURE — 62326 NJX INTERLAMINAR LMBR/SAC: CPT | Performed by: ANESTHESIOLOGY

## 2020-07-04 PROCEDURE — 99283 EMERGENCY DEPT VISIT LOW MDM: CPT | Mod: 25,,, | Performed by: OBSTETRICS & GYNECOLOGY

## 2020-07-04 PROCEDURE — U0002 COVID-19 LAB TEST NON-CDC: HCPCS

## 2020-07-04 RX ORDER — FAMOTIDINE 10 MG/ML
20 INJECTION INTRAVENOUS ONCE
Status: DISCONTINUED | OUTPATIENT
Start: 2020-07-04 | End: 2020-07-04

## 2020-07-04 RX ORDER — ONDANSETRON 8 MG/1
8 TABLET, ORALLY DISINTEGRATING ORAL EVERY 8 HOURS PRN
Status: DISCONTINUED | OUTPATIENT
Start: 2020-07-04 | End: 2020-07-04

## 2020-07-04 RX ORDER — CALCIUM CARBONATE 200(500)MG
500 TABLET,CHEWABLE ORAL 3 TIMES DAILY PRN
Status: DISCONTINUED | OUTPATIENT
Start: 2020-07-04 | End: 2020-07-04

## 2020-07-04 RX ORDER — PRENATAL WITH FERROUS FUM AND FOLIC ACID 3080; 920; 120; 400; 22; 1.84; 3; 20; 10; 1; 12; 200; 27; 25; 2 [IU]/1; [IU]/1; MG/1; [IU]/1; MG/1; MG/1; MG/1; MG/1; MG/1; MG/1; UG/1; MG/1; MG/1; MG/1; MG/1
1 TABLET ORAL DAILY
Status: DISCONTINUED | OUTPATIENT
Start: 2020-07-04 | End: 2020-07-06 | Stop reason: HOSPADM

## 2020-07-04 RX ORDER — BUPIVACAINE HYDROCHLORIDE 2.5 MG/ML
INJECTION, SOLUTION INFILTRATION; PERINEURAL
Status: DISCONTINUED | OUTPATIENT
Start: 2020-07-04 | End: 2020-07-04

## 2020-07-04 RX ORDER — BUPIVACAINE HYDROCHLORIDE 2.5 MG/ML
INJECTION, SOLUTION EPIDURAL; INFILTRATION; INTRACAUDAL
Status: DISPENSED
Start: 2020-07-04 | End: 2020-07-04

## 2020-07-04 RX ORDER — DIPHENHYDRAMINE HYDROCHLORIDE 50 MG/ML
25 INJECTION INTRAMUSCULAR; INTRAVENOUS EVERY 4 HOURS PRN
Status: DISCONTINUED | OUTPATIENT
Start: 2020-07-04 | End: 2020-07-06 | Stop reason: HOSPADM

## 2020-07-04 RX ORDER — SODIUM CHLORIDE 9 MG/ML
INJECTION, SOLUTION INTRAVENOUS CONTINUOUS
Status: DISCONTINUED | OUTPATIENT
Start: 2020-07-04 | End: 2020-07-04

## 2020-07-04 RX ORDER — SODIUM CHLORIDE, SODIUM LACTATE, POTASSIUM CHLORIDE, CALCIUM CHLORIDE 600; 310; 30; 20 MG/100ML; MG/100ML; MG/100ML; MG/100ML
INJECTION, SOLUTION INTRAVENOUS CONTINUOUS
Status: DISCONTINUED | OUTPATIENT
Start: 2020-07-04 | End: 2020-07-04

## 2020-07-04 RX ORDER — IBUPROFEN 600 MG/1
600 TABLET ORAL EVERY 6 HOURS
Status: DISCONTINUED | OUTPATIENT
Start: 2020-07-05 | End: 2020-07-06 | Stop reason: HOSPADM

## 2020-07-04 RX ORDER — IBUPROFEN 600 MG/1
600 TABLET ORAL EVERY 6 HOURS PRN
Qty: 30 TABLET | Refills: 1 | Status: SHIPPED | OUTPATIENT
Start: 2020-07-04 | End: 2021-04-06 | Stop reason: ALTCHOICE

## 2020-07-04 RX ORDER — DOCUSATE SODIUM 100 MG/1
200 CAPSULE, LIQUID FILLED ORAL 2 TIMES DAILY PRN
Status: DISCONTINUED | OUTPATIENT
Start: 2020-07-04 | End: 2020-07-06 | Stop reason: HOSPADM

## 2020-07-04 RX ORDER — OXYTOCIN/RINGER'S LACTATE 30/500 ML
95 PLASTIC BAG, INJECTION (ML) INTRAVENOUS ONCE
Status: DISCONTINUED | OUTPATIENT
Start: 2020-07-04 | End: 2020-07-06 | Stop reason: HOSPADM

## 2020-07-04 RX ORDER — OXYTOCIN/RINGER'S LACTATE 30/500 ML
334 PLASTIC BAG, INJECTION (ML) INTRAVENOUS ONCE
Status: COMPLETED | OUTPATIENT
Start: 2020-07-04 | End: 2020-07-04

## 2020-07-04 RX ORDER — HYDROCORTISONE 25 MG/G
CREAM TOPICAL 3 TIMES DAILY PRN
Status: DISCONTINUED | OUTPATIENT
Start: 2020-07-04 | End: 2020-07-06 | Stop reason: HOSPADM

## 2020-07-04 RX ORDER — IBUPROFEN 600 MG/1
600 TABLET ORAL EVERY 6 HOURS
Status: DISCONTINUED | OUTPATIENT
Start: 2020-07-04 | End: 2020-07-04

## 2020-07-04 RX ORDER — ONDANSETRON 8 MG/1
8 TABLET, ORALLY DISINTEGRATING ORAL EVERY 8 HOURS PRN
Status: DISCONTINUED | OUTPATIENT
Start: 2020-07-04 | End: 2020-07-06 | Stop reason: HOSPADM

## 2020-07-04 RX ORDER — KETOROLAC TROMETHAMINE 10 MG/1
10 TABLET, FILM COATED ORAL EVERY 6 HOURS
Status: COMPLETED | OUTPATIENT
Start: 2020-07-04 | End: 2020-07-05

## 2020-07-04 RX ORDER — SIMETHICONE 80 MG
1 TABLET,CHEWABLE ORAL 4 TIMES DAILY PRN
Status: DISCONTINUED | OUTPATIENT
Start: 2020-07-04 | End: 2020-07-04 | Stop reason: SDUPTHER

## 2020-07-04 RX ORDER — DIPHENHYDRAMINE HCL 25 MG
25 CAPSULE ORAL EVERY 4 HOURS PRN
Status: DISCONTINUED | OUTPATIENT
Start: 2020-07-04 | End: 2020-07-06 | Stop reason: HOSPADM

## 2020-07-04 RX ORDER — OXYTOCIN/RINGER'S LACTATE 30/500 ML
2 PLASTIC BAG, INJECTION (ML) INTRAVENOUS CONTINUOUS
Status: DISCONTINUED | OUTPATIENT
Start: 2020-07-04 | End: 2020-07-04

## 2020-07-04 RX ORDER — SODIUM CHLORIDE 9 MG/ML
INJECTION, SOLUTION INTRAVENOUS
Status: DISCONTINUED | OUTPATIENT
Start: 2020-07-04 | End: 2020-07-04

## 2020-07-04 RX ORDER — FENTANYL CITRATE 50 UG/ML
INJECTION, SOLUTION INTRAMUSCULAR; INTRAVENOUS
Status: DISCONTINUED
Start: 2020-07-04 | End: 2020-07-04 | Stop reason: WASHOUT

## 2020-07-04 RX ORDER — OXYTOCIN/RINGER'S LACTATE 30/500 ML
95 PLASTIC BAG, INJECTION (ML) INTRAVENOUS ONCE
Status: COMPLETED | OUTPATIENT
Start: 2020-07-04 | End: 2020-07-04

## 2020-07-04 RX ORDER — SODIUM CITRATE AND CITRIC ACID MONOHYDRATE 334; 500 MG/5ML; MG/5ML
30 SOLUTION ORAL ONCE
Status: DISCONTINUED | OUTPATIENT
Start: 2020-07-04 | End: 2020-07-04

## 2020-07-04 RX ORDER — IBUPROFEN 600 MG/1
600 TABLET ORAL EVERY 6 HOURS
Status: DISCONTINUED | OUTPATIENT
Start: 2020-07-05 | End: 2020-07-04

## 2020-07-04 RX ORDER — SIMETHICONE 80 MG
1 TABLET,CHEWABLE ORAL EVERY 6 HOURS PRN
Status: DISCONTINUED | OUTPATIENT
Start: 2020-07-04 | End: 2020-07-06 | Stop reason: HOSPADM

## 2020-07-04 RX ORDER — FENTANYL/BUPIVACAINE/NS/PF 2MCG/ML-.1
PLASTIC BAG, INJECTION (ML) INJECTION
Status: DISCONTINUED
Start: 2020-07-04 | End: 2020-07-04 | Stop reason: WASHOUT

## 2020-07-04 RX ORDER — PHENYLEPHRINE HCL IN 0.9% NACL 1 MG/10 ML
SYRINGE (ML) INTRAVENOUS
Status: DISPENSED
Start: 2020-07-04 | End: 2020-07-04

## 2020-07-04 RX ORDER — ACETAMINOPHEN 325 MG/1
650 TABLET ORAL EVERY 6 HOURS PRN
Status: DISCONTINUED | OUTPATIENT
Start: 2020-07-04 | End: 2020-07-06 | Stop reason: HOSPADM

## 2020-07-04 RX ADMIN — Medication 95 MILLI-UNITS/MIN: at 02:07

## 2020-07-04 RX ADMIN — SODIUM CHLORIDE, SODIUM LACTATE, POTASSIUM CHLORIDE, AND CALCIUM CHLORIDE 1000 ML: .6; .31; .03; .02 INJECTION, SOLUTION INTRAVENOUS at 05:07

## 2020-07-04 RX ADMIN — KETOROLAC TROMETHAMINE 10 MG: 10 TABLET, FILM COATED ORAL at 11:07

## 2020-07-04 RX ADMIN — SODIUM CHLORIDE, SODIUM LACTATE, POTASSIUM CHLORIDE, AND CALCIUM CHLORIDE: .6; .31; .03; .02 INJECTION, SOLUTION INTRAVENOUS at 06:07

## 2020-07-04 RX ADMIN — ONDANSETRON 8 MG: 8 TABLET, ORALLY DISINTEGRATING ORAL at 05:07

## 2020-07-04 RX ADMIN — Medication 2 MILLI-UNITS/MIN: at 08:07

## 2020-07-04 RX ADMIN — SODIUM CHLORIDE, SODIUM LACTATE, POTASSIUM CHLORIDE, AND CALCIUM CHLORIDE 250 ML: .6; .31; .03; .02 INJECTION, SOLUTION INTRAVENOUS at 08:07

## 2020-07-04 RX ADMIN — SODIUM CHLORIDE, SODIUM LACTATE, POTASSIUM CHLORIDE, AND CALCIUM CHLORIDE 200 ML: .6; .31; .03; .02 INJECTION, SOLUTION INTRAVENOUS at 09:07

## 2020-07-04 RX ADMIN — SODIUM CHLORIDE, SODIUM LACTATE, POTASSIUM CHLORIDE, AND CALCIUM CHLORIDE: .6; .31; .03; .02 INJECTION, SOLUTION INTRAVENOUS at 09:07

## 2020-07-04 RX ADMIN — Medication 334 MILLI-UNITS/MIN: at 02:07

## 2020-07-04 RX ADMIN — BUPIVACAINE HYDROCHLORIDE 10 ML: 2.5 INJECTION, SOLUTION INFILTRATION; PERINEURAL at 06:07

## 2020-07-04 RX ADMIN — KETOROLAC TROMETHAMINE 10 MG: 10 TABLET, FILM COATED ORAL at 06:07

## 2020-07-04 RX ADMIN — SODIUM CHLORIDE, SODIUM LACTATE, POTASSIUM CHLORIDE, AND CALCIUM CHLORIDE 250 ML: .6; .31; .03; .02 INJECTION, SOLUTION INTRAVENOUS at 07:07

## 2020-07-04 RX ADMIN — SODIUM CHLORIDE, SODIUM LACTATE, POTASSIUM CHLORIDE, AND CALCIUM CHLORIDE: .6; .31; .03; .02 INJECTION, SOLUTION INTRAVENOUS at 02:07

## 2020-07-04 RX ADMIN — BUPIVACAINE HYDROCHLORIDE: 5 INJECTION, SOLUTION EPIDURAL; INTRACAUDAL; PERINEURAL at 06:07

## 2020-07-04 NOTE — ANESTHESIA PROCEDURE NOTES
CSE    Patient location during procedure: OB  Start time: 7/4/2020 5:28 AM  Timeout: 7/4/2020 5:26 AM  End time: 7/4/2020 5:35 AM    Staffing  Authorizing Provider: Ruby Gutierrez MD  Performing Provider: Ruby Gutierrez MD    Preanesthetic Checklist  Completed: patient identified, site marked, surgical consent, pre-op evaluation, timeout performed, IV checked, risks and benefits discussed and monitors and equipment checked  CSE  Patient position: sitting  Prep: ChloraPrep  Patient monitoring: heart rate, continuous pulse ox and frequent blood pressure checks  Approach: midline  Spinal Needle  Needle type: Castro   Needle gauge: 25 G  Needle length: 5 in  Epidural Needle  Injection technique: MARYLOU air  Needle type: Tuohy   Needle gauge: 17 G  Needle length: 3.5 in  Needle insertion depth: 7 cm  Location: L2-3  Needle localization: anatomical landmarks  Catheter  Catheter type: MD Insider  Catheter size: 19 G  Catheter at skin depth: 11 cm  Test dose: lidocaine 1.5% with Epi 1-to-200,000  Additional Documentation: negative test dose  Additional Notes  bupivicaine 2.5mg administered in spinal dose

## 2020-07-04 NOTE — PROGRESS NOTES
"LABOR NOTE    Resident to bedside for routine cervical check.    S:  Complaints: Yes - pain control improved.  Epidural working: yes      O: BP (!) 103/59   Pulse 83   Temp 96.3 °F (35.7 °C) (Temporal)   Resp 18   Ht 5' 6" (1.676 m)   Wt 84.7 kg (186 lb 11.7 oz)   LMP 2019   SpO2 100%   BMI 30.14 kg/m²       FHT:  Cat 2 (reassuring), baseline 140, mod BTBV, + accels yes - decels yes, variables  CTX: q 3 minutes  SVE:  @ 0900      ASSESSMENT:   35 y.o.  at 41w0d, presents in normal labor    FHT reassuring    Active Hospital Problems    Diagnosis  POA    Uterine contractions during pregnancy [O62.2]  Yes      Resolved Hospital Problems   No resolved problems to display.       TIMELINE:  0430:   0545: SROM  0630: , will start pit due to lack of cervical change  0900: , pit at 1    PLAN:    Continue Close Maternal/Fetal Monitoring  Pitocin Augmentation per protocol to be continued  Recheck 2 hours or PRN    Norman Tao M.D.  OB/GYN PGY-1    "

## 2020-07-04 NOTE — ED PROVIDER NOTES
Encounter Date: 2020       History     Chief Complaint   Patient presents with    Contractions     Christie Bay is a 35 y.o. [id number] at 41w0d presents complaining of contractions.    This IUP has been uncomplicated. Patient reports contractions, denies vaginal bleeding, denies LOF.   Fetal Movement: normal.          Review of patient's allergies indicates:   Allergen Reactions    Codeine Anaphylaxis    Demerol [meperidine] Anaphylaxis    Morphine Anaphylaxis     Past Medical History:   Diagnosis Date    History of shingles     Migraine headache     Ulcer      Past Surgical History:   Procedure Laterality Date    CHOLECYSTECTOMY      KNEE SURGERY Left     SHOULDER SURGERY Right      Family History   Problem Relation Age of Onset    Migraines Mother     Skin cancer Mother     Cancer Mother         skin    Melanoma Mother     Hypertension Father     Diabetes Maternal Grandmother     Breast cancer Maternal Grandmother     Cancer Maternal Grandmother         breast    Brain cancer Paternal Grandfather     Cancer Paternal Grandfather         brain    Ovarian cancer Neg Hx     Colon cancer Neg Hx     Eclampsia Neg Hx     Miscarriages / Stillbirths Neg Hx      labor Neg Hx     Stroke Neg Hx      Social History     Tobacco Use    Smoking status: Never Smoker    Smokeless tobacco: Never Used   Substance Use Topics    Alcohol use: Not Currently     Comment: 2 per month.    Drug use: Yes     Review of Systems   Constitutional: Negative for chills, fatigue and fever.   Eyes: Negative for visual disturbance.   Respiratory: Negative for shortness of breath.    Cardiovascular: Negative for chest pain and leg swelling.   Gastrointestinal: Positive for abdominal pain. Negative for nausea and vomiting.   Genitourinary: Negative for dysuria.   Neurological: Negative for light-headedness and headaches.   Psychiatric/Behavioral: Negative for confusion.       Physical Exam      Initial Vitals   BP Pulse Resp Temp SpO2   -- -- -- -- --      MAP       --         Physical Exam    Constitutional: She appears well-developed and well-nourished.   HENT:   Head: Normocephalic.   Eyes: EOM are normal. Pupils are equal, round, and reactive to light.   Neck: Normal range of motion.   Cardiovascular: Normal rate and regular rhythm.   Pulmonary/Chest: Breath sounds normal.   Abdominal: Soft. Bowel sounds are normal. She exhibits no distension. There is no abdominal tenderness. There is no rebound and no guarding.   Neurological: She is alert and oriented to person, place, and time. She has normal strength.   Skin: Skin is warm and dry. No erythema.   Psychiatric: She has a normal mood and affect. Thought content normal.     OB LABOR EXAM:   Pre-Term Labor: No.   Membranes ruptured: No.   Method: Sterile vaginal exam per MD.   Vaginal Bleeding: none present.     Dilatation: 5.   Station: 0.   Effacement: 90%.             ED Course   Obtain Fetal nonstress test (NST)    Date/Time: 7/4/2020 6:15 AM  Performed by: Katherine C Boecking, MD  Authorized by: Katherine C Boecking, MD     Nonstress Test:     Variability:  6-25 BPM    Decelerations:  None    Accelerations:  15 bpm    Baseline:  130    Uterine Irritability: No      Contractions:  Regular  Biophysical Profile:     Nonstress Test Interpretation: reactive      Overall Impression:  Reassuring      Labs Reviewed - No data to display       Imaging Results    None          Medical Decision Making:   ED Management:  VSS  NST reactive and reassuring  Contractions q3-5 min  SVE 5/90/0  Presentation cephalic   Admit to L&D for labor  Other:   I discussed test(s) with the performing physician.                                 Clinical Impression:       ICD-10-CM ICD-9-CM   1. Uterine contractions during pregnancy  O62.2 661.20   2. 41 weeks gestation of pregnancy  Z3A.41 645.10                                Katherine C Boecking, MD  Resident  07/04/20  [id number]

## 2020-07-04 NOTE — PROGRESS NOTES
"LABOR NOTE    Resident to bedside for routine cervical check.  S:  Complaints: No.  Epidural working: Yes.      O: BP (!) 90/52   Pulse 81   Temp 96.8 °F (36 °C) (Temporal)   Resp 18   Ht 5' 6" (1.676 m)   Wt 84.7 kg (186 lb 11.7 oz)   LMP 2019   SpO2 99%   BMI 30.14 kg/m²       FHT:  125, mod BTBV, + accels, + occ late decels, Cat 2  CTX: q 3-5 minutes  SVE: 10/100/+1      ASSESSMENT:   35 y.o.  at 41w0d, presents in normal labor    FHT reassuring    Active Hospital Problems    Diagnosis  POA    Uterine contractions during pregnancy [O62.2]  Yes      Resolved Hospital Problems   No resolved problems to display.       TIMELINE:  0430:   0545: SROM  0630: , will start pit due to lack of cervical change  0900: /0, pit at 1  1145: 10/100/+1, pit at 1    PLAN:    Continue Close Maternal/Fetal Monitoring  Pitocin Augmentation per protocol to be continued  Set up to push    Unique Beltran MD  OBGYN, PGY-3    "

## 2020-07-04 NOTE — PROGRESS NOTES
LABOR NOTE    Resident to bedside for routine cervical check.    S:  Complaints: Yes - pain control not adequate.  Epidural working:  no      O: BP (!) 105/58   Pulse 76   Temp 97.7 °F (36.5 °C) (Temporal)   Resp 18   LMP 2019   SpO2 99%       FHT:  Cat 1 (reassuring), baseline 140, mod BTBV, + accels yes - decels no  CTX: q 5 minutes, difficulty picking up on TOCO  SVE:  @0630, SROM @0545      ASSESSMENT:   35 y.o.  at 41w0d, presents in normal labor    FHT reassuring    Active Hospital Problems    Diagnosis  POA    Uterine contractions during pregnancy [O62.2]  Yes      Resolved Hospital Problems   No resolved problems to display.       TIMELINE:  0430:   0545: SROM  0630: , will start pit due to lack of cervical change    PLAN:    Continue Close Maternal/Fetal Monitoring  Pitocin Augmentation per protocol to be initiated  Recheck 2 hours or PRN    Norman Tao M.D.  OB/GYN PGY-1

## 2020-07-04 NOTE — L&D DELIVERY NOTE
[facility]  Vaginal Delivery   Operative Note    SUMMARY     Normal spontaneous vaginal delivery of live infant, was placed on mothers abdomen for skin to skin and bulb suctioning performed.  Infant delivered position OA over intact perineum.  Nuchal cord: No.    Spontaneous delivery of placenta and IV pitocin given noting good uterine tone.  2nd degree laceration noted and repaired in normal fashion with 2-0 vicryl down the posterior aspect of the vagina. 3-0 vicryl subcuticular up the perineum.  Patient tolerated delivery well. Sponge needle and lap counted correctly x2.    Norman Tao M.D.  OB/GYN PGY-1      Indications:  (spontaneous vaginal delivery)  Pregnancy complicated by:   Patient Active Problem List   Diagnosis    Migraine headache    ART- had IUI and letrozole    Fetal heart rate/rhythm abnormality, antepartum     (spontaneous vaginal delivery)     Admitting GA: 41w0d    Delivery Information for Fritz Bay    Birth information:  YOB: 2019   Time of birth: 2:05 PM   Sex: male   Head Delivery Date/Time: 2020  2:05 PM   Delivery type: Vaginal, Spontaneous   Gestational Age: 41w0d    Delivery Providers    Delivering clinician: Morena Oconnell DO   Provider Role    Norman Tao MD Resident    Willow Forrest, RN Delivery Nurse    Yamilka Harvey, RN Charge Nurse    Jaye Martinez RN Registered Nurse    [facility]            Measurements    Weight: 3912 g  Length: 54 cm  Head circumference: 35.6 cm  Chest circumference: 35.6 cm         Apgars    Living status: Living  Apgars:  1 min.:  5 min.:  10 min.:  15 min.:  20 min.:    Skin color:  1  1       Heart rate:  2  2       Reflex irritability:  2  2       Muscle tone:  2  2       Respiratory effort:  2  2       Total:  9  9       Apgars assigned by: LATOSHA MARTINEZ RN         Operative Delivery    Forceps attempted?: No  Vacuum extractor attempted?: No         Shoulder  Dystocia    Shoulder dystocia present?: No           Presentation    Presentation: Vertex  Position: Occiput Anterior           Interventions/Resuscitation    Method: Tactile Stimulation, Bulb Suctioning       Cord    Vessels: 3 vessels  Complications: Body  Cord Around: left upper extremity  Number of Loops: 1  Delayed Cord Clamping?: Yes  Cord Clamped Date/Time: 2020  2:07 PM  Cord Blood Disposition: Lab  Gases Sent?: No  Stem Cell Collection (by MD): No       Placenta    Placenta delivery date/time: 2020 1411  Placenta removal: Spontaneous  Placenta appearance: Intact  Placenta disposition: discarded           Labor Events:       labor: No     Labor Onset Date/Time:         Dilation Complete Date/Time:         Start Pushing Date/Time:         Start Pushing Date/Time:       Rupture Date/Time: 20         Rupture type:          Fluid Amount:       Fluid Color: Clear      Fluid Odor:       Membrane Status: SRM (Spontaneous Rupture)               steroids: None     Antibiotics given for GBS: No     Induction: none     Indications for induction:        Augmentation: oxytocin     Indications for augmentation: Ineffective Contraction Pattern     Labor complications: None     Additional complications:          Cervical ripening:                     Delivery:      Episiotomy: None     Indication for Episiotomy:       Perineal Lacerations: 2nd Repaired:  Yes   Periurethral Laceration:   Repaired:     Labial Laceration:   Repaired:     Sulcus Laceration:   Repaired:     Vaginal Laceration:   Repaired:     Cervical Laceration:   Repaired:     Repair suture:       Repair # of packets: 3     Last Value - EBL - Nursing (mL):       Sum - EBL - Nursing (mL): 0     Last Value - EBL - Anesthesia (mL):      Calculated QBL (mL): 720     Vaginal Sweep Performed: Yes     Surgicount Correct: Yes       Other providers:       Anesthesia    Method: Epidural          Details (if  applicable):  Trial of Labor      Categorization:      Priority:     Indications for :     Incision Type:       Additional  information:  Forceps:    Vacuum:    Breech:    Observed anomalies    Other (Comments):

## 2020-07-04 NOTE — NURSING
Pt arrived to [facility] with c/o ctxs since midnight. Pt reports ctxs to be every 3 minutes, no LOF or vaginal bleeding, +FM. AMINATA Silverman MD notified.

## 2020-07-04 NOTE — ANESTHESIA PREPROCEDURE EVALUATION
2020  Christie Bay is a 35 y.o., female.  Christie Bay is a 35 y.o. female     OB History    Para Term  AB Living   1 0 0 0 0 0   SAB TAB Ectopic Multiple Live Births   0 0 0 0        # Outcome Date GA Lbr Gigi/2nd Weight Sex Delivery Anes PTL Lv   1 Current                Wt Readings from Last 1 Encounters:   20 1050 84.7 kg (186 lb 11.7 oz)       BP Readings from Last 3 Encounters:   20 (!) 97/54   20 108/64   20 124/62       Patient Active Problem List   Diagnosis    Migraine headache    Encounter for supervision of normal first pregnancy in second trimester    ART- had IUI and letrozole    Fetal heart rate/rhythm abnormality, antepartum    Uterine contractions during pregnancy       Past Surgical History:   Procedure Laterality Date    CHOLECYSTECTOMY      KNEE SURGERY Left     SHOULDER SURGERY Right        Social History     Socioeconomic History    Marital status:      Spouse name: Not on file    Number of children: 0    Years of education: Not on file    Highest education level: Not on file   Occupational History    Occupation: [employer]     Employer: [employer]   Social Needs    Financial resource strain: Not on file    Food insecurity     Worry: Not on file     Inability: Not on file    Transportation needs     Medical: Not on file     Non-medical: Not on file   Tobacco Use    Smoking status: Never Smoker    Smokeless tobacco: Never Used   Substance and Sexual Activity    Alcohol use: Not Currently     Comment: 2 per month.    Drug use: Yes    Sexual activity: Yes     Partners: Male   Lifestyle    Physical activity     Days per week: Not on file     Minutes per session: Not on file    Stress: Not on file   Relationships    Social connections     Talks on phone: Not on file      Gets together: Not on file     Attends Anglican service: Not on file     Active member of club or organization: Not on file     Attends meetings of clubs or organizations: Not on file     Relationship status: Not on file   Other Topics Concern    Are you pregnant or think you may be? Not Asked    Breast-feeding Not Asked   Social History Narrative    Not on file         Chemistry        Component Value Date/Time     03/02/2020 0907    K 3.7 03/02/2020 0907     03/02/2020 0907    CO2 23 03/02/2020 0907    BUN 8 03/02/2020 0907    CREATININE 0.6 03/02/2020 0907    GLU 70 03/02/2020 0907        Component Value Date/Time    CALCIUM 8.9 03/02/2020 0907    ALKPHOS 72 03/02/2020 0907    AST 26 03/02/2020 0907    ALT 34 03/02/2020 0907    BILITOT 0.6 03/02/2020 0907    ESTGFRAFRICA >60 03/02/2020 0907    EGFRNONAA >60 03/02/2020 0907            Lab Results   Component Value Date    WBC 15.07 (H) 07/04/2020    HGB 12.0 07/04/2020    HCT 34.9 (L) 07/04/2020    MCV 95 07/04/2020     07/04/2020       No results for input(s): PT, INR, PROTIME, APTT in the last 72 hours.                Anesthesia Evaluation    I have reviewed the Patient Summary Reports.    I have reviewed the Nursing Notes.       Review of Systems  Anesthesia Hx:  Hx of Anesthetic complications Anaphylaxis during knee sx due to morphine administration then again during shoulder surgery due to demerol administration. Patient states she was told to only take tylenol for pain postoperatively. No prior procedures within [facility]. History of prior surgery of interest to airway management or planning: Previous anesthesia: General Denies Family Hx of Anesthesia complications.  Personal Hx of Anesthesia complications   Social:  Non-Smoker    Cardiovascular:  Cardiovascular Normal     Pulmonary:  Pulmonary Normal    Hepatic/GI:  Hepatic/GI Normal    Endocrine:  Endocrine Normal        Physical Exam  General:  Well nourished     Airway/Jaw/Neck:  Airway Findings: Mouth Opening: Normal Tongue: Normal  General Airway Assessment: Adult  Mallampati: II  TM Distance: Normal, at least 6 cm  Jaw/Neck Findings:  Neck ROM: Normal ROM      Dental:  Dental Findings: In tact   Chest/Lungs:  Chest/Lungs Findings: Normal Respiratory Rate     Heart/Vascular:  Heart Findings: Rate: Tachycardia        Mental Status:  Mental Status Findings:  Cooperative, Alert and Oriented         Anesthesia Plan  Type of Anesthesia, risks & benefits discussed:  Anesthesia Type:  CSE, epidural, general, spinal  Patient's Preference:   Intra-op Monitoring Plan: standard ASA monitors  Intra-op Monitoring Plan Comments:   Post Op Pain Control Plan:   Post Op Pain Control Plan Comments:   Induction:    Beta Blocker:         Informed Consent: Patient understands risks and agrees with Anesthesia plan.  Questions answered. Anesthesia consent signed with patient.  ASA Score: 2     Day of Surgery Review of History & Physical:    H&P update referred to the surgeon.     Anesthesia Plan Notes: Pt has had anaphylaxis to morphine & demerol, no prior deliveries, no anesthesia records scanned into media. Pt has not seen anesthesia in  consult. Plan for LA only epidural.         Ready For Surgery From Anesthesia Perspective.

## 2020-07-04 NOTE — H&P
HISTORY AND PHYSICAL                                                OBSTETRICS          Subjective:      Christie Bay is a 35 y.o. [id number] at 41w0d presents complaining of contractions.    This IUP has been uncomplicated. Patient reports contractions, denies vaginal bleeding, denies LOF.   Fetal Movement: normal.    Review of Systems   Constitutional: Negative for chills, fatigue and fever.   Eyes: Negative for visual disturbance.   Respiratory: Negative for shortness of breath.    Cardiovascular: Negative for chest pain and leg swelling.   Gastrointestinal: Positive for abdominal pain. Negative for nausea and vomiting.   Genitourinary: Negative for dysuria.   Neurological: Negative for light-headedness and headaches.   Psychiatric/Behavioral: Negative for confusion.      PMHx:   Past Medical History:   Diagnosis Date    History of shingles     Migraine headache     Ulcer        PSHx:   Past Surgical History:   Procedure Laterality Date    CHOLECYSTECTOMY      KNEE SURGERY Left     SHOULDER SURGERY Right        All:   Review of patient's allergies indicates:   Allergen Reactions    Codeine Anaphylaxis    Demerol [meperidine] Anaphylaxis    Morphine Anaphylaxis       Meds: (Not in a hospital admission)      SH:   Social History     Socioeconomic History    Marital status:      Spouse name: Not on file    Number of children: 0    Years of education: Not on file    Highest education level: Not on file   Occupational History    Occupation: [employer]     Employer: [employer]   Social Needs    Financial resource strain: Not on file    Food insecurity     Worry: Not on file     Inability: Not on file    Transportation needs     Medical: Not on file     Non-medical: Not on file   Tobacco Use    Smoking status: Never Smoker    Smokeless tobacco: Never Used   Substance and Sexual Activity    Alcohol use: Not Currently     Comment: 2 per month.    Drug use: Yes     Sexual activity: Yes     Partners: Male   Lifestyle    Physical activity     Days per week: Not on file     Minutes per session: Not on file    Stress: Not on file   Relationships    Social connections     Talks on phone: Not on file     Gets together: Not on file     Attends Shinto service: Not on file     Active member of club or organization: Not on file     Attends meetings of clubs or organizations: Not on file     Relationship status: Not on file   Other Topics Concern    Are you pregnant or think you may be? Not Asked    Breast-feeding Not Asked   Social History Narrative    Not on file       FH:   Family History   Problem Relation Age of Onset    Migraines Mother     Skin cancer Mother     Cancer Mother         skin    Melanoma Mother     Hypertension Father     Diabetes Maternal Grandmother     Breast cancer Maternal Grandmother     Cancer Maternal Grandmother         breast    Brain cancer Paternal Grandfather     Cancer Paternal Grandfather         brain    Ovarian cancer Neg Hx     Colon cancer Neg Hx     Eclampsia Neg Hx     Miscarriages / Stillbirths Neg Hx      labor Neg Hx     Stroke Neg Hx        OBHx:   OB History    Para Term  AB Living   1 0 0 0 0 0   SAB TAB Ectopic Multiple Live Births   0 0 0 0 0      # Outcome Date GA Lbr Gigi/2nd Weight Sex Delivery Anes PTL Lv   1 Current                Objective:       BP (!) 97/54 (BP Location: Right arm, Patient Position: Lying)   Pulse 95   Temp 97.7 °F (36.5 °C) (Temporal)   Resp 18   LMP 2019   SpO2 99%     Vitals:    20 0357 20 0358 20 0405 20 0406   BP: (!) 97/54      BP Location: Right arm      Patient Position: Lying      Pulse: 89 93  95   Resp:  18     Temp: 97.7 °F (36.5 °C)      TempSrc: Temporal      SpO2:  96% 99%        General:   alert, appears stated age and cooperative, no apparent distress   HENT:  normocephalic, atraumatic   Eyes:  extraocular movements  and conjunctivae normal   Neck:  supple, range of motion normal, no thyromegaly   Lungs:   no respiratory distress   Heart:   regular rate   Abdomen:  soft, non-tender, non-distended but gravid, no rebound or guarding    Extremities negative edema, negative erythema   FHT: 130, moderate BTBV, +accels, -decels;  Cat 1 (reassuring)                 TOCO: Q 3-5 minutes   Presentations: cephalic by ultrasound   Cervix:     Dilation: 5    Effacement: 90    Station:  0    Consistency: soft    Position: middle         Lab Review  Blood Type O POS  GBBS: negative  Rubella: Immune  RPR: nr  HIV: negative  HepB: negative       Assessment:       41w0d weeks gestation with contractions admitted for labor    There are no hospital problems to display for this patient.         Plan:      Risks, benefits, alternatives and possible complications have been discussed in detail with the patient.   - Consents signed and to chart  - Admit to Labor and Delivery unit   - Epidural per Anesthesia  - Draw CBC, T&S  - Notify Staff  - Recheck in 2-4 hrs or PRN      Post-Partum Hemorrhage risk - low    Katherine Boecking MD   PGY 1

## 2020-07-05 PROBLEM — O09.819 HIGH RISK PREGNANCY DUE TO ASSISTED REPRODUCTIVE TECHNOLOGY: Status: RESOLVED | Noted: 2020-03-17 | Resolved: 2020-07-05

## 2020-07-05 PROCEDURE — 25000003 PHARM REV CODE 250: Performed by: STUDENT IN AN ORGANIZED HEALTH CARE EDUCATION/TRAINING PROGRAM

## 2020-07-05 PROCEDURE — 11000001 HC ACUTE MED/SURG PRIVATE ROOM

## 2020-07-05 RX ADMIN — IBUPROFEN 600 MG: 600 TABLET, FILM COATED ORAL at 06:07

## 2020-07-05 RX ADMIN — KETOROLAC TROMETHAMINE 10 MG: 10 TABLET, FILM COATED ORAL at 05:07

## 2020-07-05 RX ADMIN — IBUPROFEN 600 MG: 600 TABLET, FILM COATED ORAL at 11:07

## 2020-07-05 RX ADMIN — KETOROLAC TROMETHAMINE 10 MG: 10 TABLET, FILM COATED ORAL at 11:07

## 2020-07-05 NOTE — ANESTHESIA POSTPROCEDURE EVALUATION
Anesthesia Post Evaluation    Patient: Christie Bay    Procedure(s) Performed: * No procedures listed *    Final Anesthesia Type: epidural    Patient location during evaluation: labor & delivery  Patient participation: Yes- Able to Participate  Level of consciousness: awake and alert and oriented  Post-procedure vital signs: reviewed and stable  Pain management: adequate  Airway patency: patent  KARINE mitigation strategies: Multimodal analgesia and Use of major conduction anesthesia (spinal/epidural) or peripheral nerve block  PONV status at discharge: No PONV  Anesthetic complications: no      Cardiovascular status: blood pressure returned to baseline and hemodynamically stable  Respiratory status: unassisted, spontaneous ventilation and room air  Hydration status: euvolemic  Follow-up not needed.          Vitals Value Taken Time   BP 99/59 07/05/20 0816   Temp 36.7 °C (98 °F) 07/05/20 0816   Pulse 80 07/05/20 0816   Resp 16 07/05/20 0816   SpO2 96 % 07/05/20 0816         No case tracking events are documented in the log.      Pain/Tex Score: Pain Rating Prior to Med Admin: 3 (7/5/2020 11:26 AM)  Pain Rating Post Med Admin: 0 (7/5/2020  6:30 AM)

## 2020-07-05 NOTE — DISCHARGE INSTRUCTIONS
Breastfeeding Discharge Instructions       Feed the baby at the earliest sign of hunger or comfort  o Hands to mouth, sucking motions  o Rooting or searching for something to suck on  o Dont wait for crying - it is a sign of distress     The feedings may be 8-12 times per 24hrs and will not follow a schedule   Avoid pacifiers and bottles for the first 4 weeks   Alternate the breast you start the feeding with, or start with the breast that feels the fullest   Switch breasts when the baby takes himself off the breast or falls asleep   Keep offering breasts until the baby looks full, no longer gives hunger signs, and stays asleep when placed on his back in the crib   If the baby is sleepy and wont wake for a feeding, put the baby skin-to-skin dressed in a diaper against the mothers bare chest   Sleep near your baby   The baby should be positioned and latched on to the breast correctly  o Chest-to-chest, chin in the breast  o Babys lips are flipped outward  o Babys mouth is stretched open wide like a shout  o Babys sucking should feel like tugging to the mother  - The baby should be drinking at the breast:  o You should hear swallowing or gulping throughout the feeding  o You should see milk on the babys lips when he comes off the breast  o Your breasts should be softer when the baby is finished feeding  o The baby should look relaxed at the end of feedings  o After the 4th day and your milk is in:  o The babys poop should turn bright yellow and be loose, watery, and seedy  o The baby should have at least 3-4 poops the size of the palm of your hand per day  o The baby should have at least 5-6 wet diapers per day  o The urine should be light yellow in color  You should drink when you are thirsty and eat a healthy diet when you are    hungry.     Take naps to get the rest you need.   Take medications and/or drink alcohol only with permission of your obstetrician    or the babys pediatrician.  You can  also call the [facility] Risk [facility],   ([phone]), Monday-Friday, 8am-5pm Central time, to get the most   up-to-date evidence-based information on the use of medications during   pregnancy and breastfeeding.      The baby should be examined by a pediatrician at 3-5 days of age.  Once your   milk comes in, the baby should be gaining at least ½ - 1oz each day and should be back to birthweight no later than 10-14 days of age.          Community Resources    [facility] Breastfeeding Warmline: [phone]   Local [facility] clinics: provide incentives and breastpumps to eligible mothers  La Leche [employer] (LLLI):  mother-to-mother support group website        www.[employer]  Local La Leche League mother-to-mother support groups:        www.[employer]        La Leche League [facility]   Dr. Luis Antonio Godlberg website for latch videos and general information:        www.breastfeedinginc.ca  [facility] is a call center that provides information about the safety of taking medications while breastfeeding.  Call 1-596.413.2050, M-F, 8am-5pm, [place].  International Lactation Consultant Association provides resources for assistance:        www.ilca.org  [place] Breastfeeding Coalition provides informationand resources for parents  and the community    www.LaBreastfeedingSupport.org     Elaine Jean is a mom-to-mom support group:                             www.[employer].com//breastfeedng-support/  Partners for Healthy Babies:  8-455-646-BABY(3283)  [employer]: a breastfeeding support group for women of color, [phone]

## 2020-07-05 NOTE — PLAN OF CARE
Lactation note:  Lactation consultant's first visit with patient. Reviewed the breastfeeding guide and encouraged the patient to feed infant 8 or more times in 24 hours on cue until content. Infant ready to feed, so assisted with football and cross cradle positions to both breasts. Patient's nipples are short and infant needed several tries to latch successfully. Infant nursing effectively with stimulation/breast compression. Noted right nipple reddened; mom to apply lanolin after nursing and use shells to help helene nipples. Offered to assist with breastfeeding at next feeding. Left LC phone number on board for patient to call for assistance.

## 2020-07-05 NOTE — LACTATION NOTE
07/05/20 0917   Maternal Assessment   Breast Shape Bilateral:;round   Breast Density Bilateral:;soft   Areola Bilateral:;elastic   Nipples Bilateral:;flat;graspable   Right Nipple Symptoms redness   Maternal Infant Feeding   Infant Positioning clutch/football;cross-cradle   Signs of Milk Transfer audible swallow;infant jaw motion present   Pain with Feeding no   Nipple Shape After Feeding, Left round   Nipple Shape After Feeding, Right slightly pointed   Latch Assistance yes   Lactation note:  Reviewed basic breastfeeding education with mother of infant using the breastfeeding guide. Mother needed full assistance to latch her infant to the breast and after several tries, infant nursing effectively with breast compression/stimulation. Encouraged nursing infant at least 8 times in 24 hours on cue until content. Discussed bottles and pacifiers and risks of both as well as benefits of breastfeeding.  phone number placed on board for further questions or assistance as needed.

## 2020-07-05 NOTE — PROGRESS NOTES
POSTPARTUM PROGRESS NOTE     Christie Bay is a 35 y.o. female PPD #1 status post Spontaneous vaginal delivery at 41w0d in a pregnancy complicated by a history of migraines.    Patient is doing well this morning. She denies nausea, vomiting, fever or chills. Patient reports mild abdominal pain that is adequately relieved by oral pain medications. Lochia is mild to moderate and stable. Patient is voiding without difficulty and ambulating with no difficulty. She has passed flatus.    Patient does plan to breast feed. Per primary OB for contraception. She desires circumcision.     Objective:       Temp:  [96.3 °F (35.7 °C)-98.8 °F (37.1 °C)] 98 °F (36.7 °C)  Pulse:  [] 81  Resp:  [16-18] 18  SpO2:  [93 %-100 %] 96 %  BP: ()/(50-76) 97/51    General:   alert, appears stated age and cooperative   Lungs:   Non-labored respirations    Heart:   regular rate and rhythm   Abdomen:  Soft, nondistended    Uterus:  firm located at the umbilicus.    Extremities: no pedal edema noted     Lab Review  No results found for this or any previous visit (from the past 4 hour(s)).    I/O    Intake/Output Summary (Last 24 hours) at 2020 0454  Last data filed at 2020 2120  Gross per 24 hour   Intake --   Output 1824 ml   Net -1824 ml        Assessment:     Patient Active Problem List   Diagnosis    Migraine headache    ART- had IUI and letrozole    Fetal heart rate/rhythm abnormality, antepartum     (spontaneous vaginal delivery)        Plan:   1. Postpartum care:  - Patient doing well. Continue routine management and advances.  - Continue PO pain meds. Pain well controlled.  - Heme: H/h  >>> minimal blood loss during delivery, will continue to monitor  - Encourage ambulation  - Circumcision desired, consents signed and placed in chart  - Contraception per primary OB  - Lactation consult PRN    2. History of migraines:  - Currently asymptomatic      Dispo: As patient meets milestones, will  plan to discharge on PPD 2.      Norman Tao M.D.  OB/GYN PGY-1

## 2020-07-06 VITALS
BODY MASS INDEX: 30.01 KG/M2 | WEIGHT: 186.75 LBS | DIASTOLIC BLOOD PRESSURE: 60 MMHG | HEART RATE: 71 BPM | TEMPERATURE: 98 F | HEIGHT: 66 IN | RESPIRATION RATE: 18 BRPM | OXYGEN SATURATION: 95 % | SYSTOLIC BLOOD PRESSURE: 101 MMHG

## 2020-07-06 PROCEDURE — 25000003 PHARM REV CODE 250: Performed by: STUDENT IN AN ORGANIZED HEALTH CARE EDUCATION/TRAINING PROGRAM

## 2020-07-06 RX ADMIN — DOCUSATE SODIUM 200 MG: 100 CAPSULE, LIQUID FILLED ORAL at 10:07

## 2020-07-06 RX ADMIN — IBUPROFEN 600 MG: 600 TABLET, FILM COATED ORAL at 06:07

## 2020-07-06 NOTE — PROGRESS NOTES
POSTPARTUM PROGRESS NOTE     Christie Bay is a 35 y.o. female PPD #2 status post Spontaneous vaginal delivery at 41w0d in a pregnancy complicated by a history of migraines.    Patient is doing well this morning. She denies nausea, vomiting, fever or chills. Patient reports mild abdominal pain that is adequately relieved by oral pain medications. Lochia is mild to moderate and stable. Patient is voiding without difficulty and ambulating with no difficulty. She has passed flatus.    Patient does plan to breast feed. Per primary OB for contraception. She desires circumcision.     Objective:       Temp:  [97.9 °F (36.6 °C)-98.3 °F (36.8 °C)] 97.9 °F (36.6 °C)  Pulse:  [78-80] 78  Resp:  [16-18] 18  SpO2:  [96 %-99 %] 96 %  BP: ()/(51-59) 103/51    General:   alert, appears stated age and cooperative   Lungs:   Non-labored respirations    Heart:   regular rate and rhythm   Abdomen:  Soft, nondistended    Uterus:  firm located at the umbilicus.    Extremities: no pedal edema noted     Lab Review  No results found for this or any previous visit (from the past 4 hour(s)).    I/O  No intake or output data in the 24 hours ending 20 0627     Assessment:     Patient Active Problem List   Diagnosis     (spontaneous vaginal delivery)        Plan:   1. Postpartum care:  - Patient doing well. Continue routine management and advances.  - Continue PO pain meds. Pain well controlled.  - Heme: H/h  >>> minimal blood loss during delivery, will continue to monitor  - Encourage ambulation  - Circumcision completed  - Contraception per primary OB  - Lactation consult PRN    2. History of migraines:  - Currently asymptomatic      Dispo: As patient meets milestones, will plan to discharge on PPD 2.      Mayra Roche M.D.  OB/GYN PGY-1

## 2020-07-06 NOTE — LACTATION NOTE
07/06/20 0900   Breasts WDL   Breast WDL WDL   Left Nipple Symptoms redness   Right Nipple Symptoms redness   Maternal Feeding Assessment   Infant Positioning cross-cradle   Signs of Milk Transfer infant jaw motion present   Pain with Feeding yes   Nipple Shape After Feeding, Left   (round)   Pain Location nipples, bilateral   Pain Description soreness  (inital latch pain 6 that decreases to 2)   Maternal Emotional State relaxed   Comfort Measures Before/During Feeding infant position adjusted;latch adjusted;maternal position adjusted   Reproductive Interventions   Breast Care: Breastfeeding lanolin to nipples;open to air   Breastfeeding Assistance assisted with positioning;feeding cue recognition promoted;feeding on demand promoted;feeding session observed;infant latch-on verified;infant suck/swallow verified;support offered   Breastfeeding Support diary/feeding log utilized;encouragement provided;infant-mother separation minimized;lactation counseling provided;maternal hydration promoted;maternal nutrition promoted;maternal rest encouraged   Safety Management   Patient Rounds visualized patient  (LCT at bedside)   Lactation discharge education reviewed. Latch assistance provided. Assisted pt with latching infant cross cradle hold, good tugs and pulls with breast compression and infant stimulation.

## 2020-07-06 NOTE — PLAN OF CARE
Pt ambulating and voiding without difficulty. Patient safety maintained, side rails up, bed low and locked position.  Pain well controlled with PRN pain medication. Fundus midline, firm, with moderate lochia. VSS. Significant other at bedside; parents responding to infant cues and bonding appropriately. Will continue to monitor.

## 2020-07-06 NOTE — DISCHARGE SUMMARY
Delivery Discharge Summary  Obstetrics      Primary OB Clinician: Lashawn Mendez MD     Admission date: 2020  Discharge date: 2020    Disposition: To home, self care    Discharge Diagnosis List:      Patient Active Problem List   Diagnosis     (spontaneous vaginal delivery)       Procedure:     Hospital Course:  Christie Bay is a 35 y.o. now , PPD #2 who was admitted on 2020 at 41w0d for spontaneous labor. Patient was subsequently admitted to labor and delivery unit with signed consents.     Labor course was uncomplicated and resulted in  without complications.     Please see delivery note for further details. Her postpartum course was uncomplicated. On discharge day, patient's pain is controlled with oral pain medications. Pt is tolerating ambulation without SOB or CP, and regular diet without N/V. Reports lochia is mild. Denies any HA, vision changes, F/C, LE swelling. Denies any breast pain/soreness.    Pt in stable condition and ready for discharge. She has been instructed to start and/or continue medications and follow up with her obstetrics provider as listed below.    Pertinent studies:  CBC  Recent Labs   Lab 20  0433   WBC 15.07*   HGB 12.0   HCT 34.9*   MCV 95             Immunization History   Administered Date(s) Administered    Tdap 2016, 2020        Delivery:    Episiotomy: None   Lacerations: 2nd   Repair suture:     Repair # of packets: 3   Blood loss (ml):       Birth information:  YOB: 2019   Time of birth: 2:05 PM   Sex: male   Delivery type: Vaginal, Spontaneous   Gestational Age: 41w0d    Delivery Clinician:      Other providers:       Additional  information:  Forceps:    Vacuum:    Breech:    Observed anomalies      Living?:           APGARS  One minute Five minutes Ten minutes   Skin color:         Heart rate:         Grimace:         Muscle tone:         Breathing:         Totals: 9  9         Placenta: Delivered:       appearance      Patient Instructions:   Current Discharge Medication List      START taking these medications    Details   ibuprofen (ADVIL,MOTRIN) 600 MG tablet Take 1 tablet (600 mg total) by mouth every 6 (six) hours as needed for Pain.  Qty: 30 tablet, Refills: 1         CONTINUE these medications which have NOT CHANGED    Details   acetaminophen (TYLENOL) 100 mg/mL suspension Take by mouth every 4 (four) hours as needed for Temperature greater than.      prenatal vit calc,iron,folic (PRENATAL VITAMIN ORAL) Take by mouth.             Discharge Procedure Orders   Pelvic Rest     Notify your health care provider if you experience any of the following:  temperature >100.4     Notify your health care provider if you experience any of the following:  persistent nausea and vomiting or diarrhea     Notify your health care provider if you experience any of the following:  severe uncontrolled pain     Notify your health care provider if you experience any of the following:  redness, tenderness, or signs of infection (pain, swelling, redness, odor or green/yellow discharge around incision site)     Notify your health care provider if you experience any of the following:  difficulty breathing or increased cough     Notify your health care provider if you experience any of the following:  severe persistent headache     Notify your health care provider if you experience any of the following:  worsening rash     Notify your health care provider if you experience any of the following:  persistent dizziness, light-headedness, or visual disturbances     Notify your health care provider if you experience any of the following:  increased confusion or weakness     Notify your health care provider if you experience any of the following:   Order Comments: Heavy vaginal bleeding       Follow-up Information     Lashawn Mendez MD. Schedule an appointment as soon as possible for a visit in 6 weeks.     Specialty: Obstetrics and Gynecology  Why: Postpartum visit  Contact information:  [address] [facility] [address]  [phone]                    Mayra Roche MD  OBGYN PGY-1

## 2020-07-08 ENCOUNTER — TELEPHONE (OUTPATIENT)
Dept: SURGERY | Facility: OTHER | Age: 35
End: 2020-07-08

## 2020-07-13 ENCOUNTER — TELEPHONE (OUTPATIENT)
Dept: OBSTETRICS AND GYNECOLOGY | Facility: CLINIC | Age: 35
End: 2020-07-13

## 2020-07-13 NOTE — TELEPHONE ENCOUNTER
Returned pt call, informed pt that I spoke with Dr Jeansonne. Advised pt to elevated her feet. Take tylenol every 4-6 hours. And if her bp is 140/90 to come in to the [facility]. Pt verbalized understanding.

## 2020-07-13 NOTE — TELEPHONE ENCOUNTER
----- Message from Sara Vallecillo sent at 7/13/2020  3:51 PM CDT -----  Patient said she sent a message through her portal also, but she is not feeling to well her feet is swollen, her blood pressure is High, and she is having headaches    Patient can be reached at [phone]

## 2020-07-14 ENCOUNTER — HOSPITAL ENCOUNTER (EMERGENCY)
Facility: OTHER | Age: 35
Discharge: HOME | End: 2020-07-14
Attending: OBSTETRICS & GYNECOLOGY
Payer: COMMERCIAL

## 2020-07-14 VITALS
DIASTOLIC BLOOD PRESSURE: 82 MMHG | SYSTOLIC BLOOD PRESSURE: 140 MMHG | OXYGEN SATURATION: 99 % | RESPIRATION RATE: 18 BRPM | HEART RATE: 53 BPM

## 2020-07-14 DIAGNOSIS — R51.9 ACUTE NONINTRACTABLE HEADACHE, UNSPECIFIED HEADACHE TYPE: ICD-10-CM

## 2020-07-14 DIAGNOSIS — R03.0 ELEVATED BLOOD PRESSURE READING: Primary | ICD-10-CM

## 2020-07-14 LAB
ALBUMIN SERPL BCP-MCNC: 3 G/DL (ref 3.5–5.2)
ALP SERPL-CCNC: 102 U/L (ref 55–135)
ALT SERPL W/O P-5'-P-CCNC: 16 U/L (ref 10–44)
ANION GAP SERPL CALC-SCNC: 10 MMOL/L (ref 8–16)
AST SERPL-CCNC: 17 U/L (ref 10–40)
BASOPHILS # BLD AUTO: 0.04 K/UL (ref 0–0.2)
BASOPHILS NFR BLD: 0.4 % (ref 0–1.9)
BILIRUB SERPL-MCNC: 0.4 MG/DL (ref 0.1–1)
BUN SERPL-MCNC: 13 MG/DL (ref 6–20)
CALCIUM SERPL-MCNC: 8.5 MG/DL (ref 8.7–10.5)
CHLORIDE SERPL-SCNC: 108 MMOL/L (ref 95–110)
CO2 SERPL-SCNC: 23 MMOL/L (ref 23–29)
CREAT SERPL-MCNC: 0.7 MG/DL (ref 0.5–1.4)
DIFFERENTIAL METHOD: ABNORMAL
EOSINOPHIL # BLD AUTO: 0.1 K/UL (ref 0–0.5)
EOSINOPHIL NFR BLD: 1.2 % (ref 0–8)
ERYTHROCYTE [DISTWIDTH] IN BLOOD BY AUTOMATED COUNT: 12 % (ref 11.5–14.5)
EST. GFR  (AFRICAN AMERICAN): >60 ML/MIN/1.73 M^2
EST. GFR  (NON AFRICAN AMERICAN): >60 ML/MIN/1.73 M^2
GLUCOSE SERPL-MCNC: 93 MG/DL (ref 70–110)
HCT VFR BLD AUTO: 30.9 % (ref 37–48.5)
HGB BLD-MCNC: 10.5 G/DL (ref 12–16)
IMM GRANULOCYTES # BLD AUTO: 0.03 K/UL (ref 0–0.04)
IMM GRANULOCYTES NFR BLD AUTO: 0.3 % (ref 0–0.5)
LYMPHOCYTES # BLD AUTO: 1.4 K/UL (ref 1–4.8)
LYMPHOCYTES NFR BLD: 15.6 % (ref 18–48)
MCH RBC QN AUTO: 32.5 PG (ref 27–31)
MCHC RBC AUTO-ENTMCNC: 34 G/DL (ref 32–36)
MCV RBC AUTO: 96 FL (ref 82–98)
MONOCYTES # BLD AUTO: 0.6 K/UL (ref 0.3–1)
MONOCYTES NFR BLD: 6.6 % (ref 4–15)
NEUTROPHILS # BLD AUTO: 6.9 K/UL (ref 1.8–7.7)
NEUTROPHILS NFR BLD: 75.9 % (ref 38–73)
NRBC BLD-RTO: 0 /100 WBC
PLATELET # BLD AUTO: 284 K/UL (ref 150–350)
PMV BLD AUTO: 9.4 FL (ref 9.2–12.9)
POTASSIUM SERPL-SCNC: 3.4 MMOL/L (ref 3.5–5.1)
PROT SERPL-MCNC: 6 G/DL (ref 6–8.4)
RBC # BLD AUTO: 3.23 M/UL (ref 4–5.4)
SODIUM SERPL-SCNC: 141 MMOL/L (ref 136–145)
WBC # BLD AUTO: 9.04 K/UL (ref 3.9–12.7)

## 2020-07-14 PROCEDURE — 63600175 PHARM REV CODE 636 W HCPCS: Performed by: STUDENT IN AN ORGANIZED HEALTH CARE EDUCATION/TRAINING PROGRAM

## 2020-07-14 PROCEDURE — 99283 EMERGENCY DEPT VISIT LOW MDM: CPT

## 2020-07-14 PROCEDURE — 85025 COMPLETE CBC W/AUTO DIFF WBC: CPT

## 2020-07-14 PROCEDURE — 99284 EMERGENCY DEPT VISIT MOD MDM: CPT | Mod: 24,,, | Performed by: OBSTETRICS & GYNECOLOGY

## 2020-07-14 PROCEDURE — 99284 PR EMERGENCY DEPT VISIT,LEVEL IV: ICD-10-PCS | Mod: 24,,, | Performed by: OBSTETRICS & GYNECOLOGY

## 2020-07-14 PROCEDURE — 80053 COMPREHEN METABOLIC PANEL: CPT

## 2020-07-14 RX ORDER — PROCHLORPERAZINE MALEATE 5 MG
10 TABLET ORAL ONCE
Status: COMPLETED | OUTPATIENT
Start: 2020-07-14 | End: 2020-07-14

## 2020-07-14 RX ORDER — PROCHLORPERAZINE MALEATE 10 MG
10 TABLET ORAL EVERY 6 HOURS PRN
Qty: 15 TABLET | Refills: 0 | Status: SHIPPED | OUTPATIENT
Start: 2020-07-14 | End: 2021-04-06 | Stop reason: ALTCHOICE

## 2020-07-14 RX ADMIN — PROCHLORPERAZINE MALEATE 10 MG: 5 TABLET ORAL at 03:07

## 2020-07-14 NOTE — DISCHARGE INSTRUCTIONS
Keep all routine appointments. Call MD if fever of 100.4 or greater. BP results with  or DBP greater than 110. Headache unresolved by tylenol 1 gram. May use over the counter decongestant per MD instruction.

## 2020-07-14 NOTE — ED NOTES
Dr. Guillen at patient bs to discuss poc with patient. Patient had headache pain on arrival at 7/10 per pain scale. After compazine dose 10mg patient's headache resolved to 1/10. MD instructed patient to try decongestant medication over the counter. Patient vocalized understanding.

## 2020-07-14 NOTE — LETTER
July 14, 2020         [address]  [place] [address]  Phone: [phone]       Patient: Christie Bay   YOB: 1985  Date of Visit: 07/14/2020    To Whom It May Concern:    Bandar Bay  was at [facility] on 07/14/2020. She may return to work/school on 7/15 with no restrictions. If you have any questions or concerns, or if I can be of further assistance, please do not hesitate to contact me.    Sincerely,    Gilma Guillen MD

## 2020-07-14 NOTE — ED PROVIDER NOTES
Encounter Date: 2020       History     Chief Complaint   Patient presents with    Hypertension     Christie Bay is a 35 y.o.  s/p  on 20 in an uncomplicated pregnancy who presents complaining of headaches and generalized fatigue over the past 2 days. Her headache feels like tension/ pressure over the back of her head and down her neck. The HA is worse with laying flat, and relieved with sitting straight up. She took tylenol at home which only partially relieved the HA. She also took her BP at home and had mild range elevations. She called her primary OBGYN and was told to come to the [facility].     She denies any vision changes, SOB, CP, RUQ pain, or increased swelling.     Review of patient's allergies indicates:   Allergen Reactions    Codeine Anaphylaxis    Demerol [meperidine] Anaphylaxis    Morphine Anaphylaxis     Past Medical History:   Diagnosis Date    History of shingles     Migraine headache     Ulcer      Past Surgical History:   Procedure Laterality Date    CHOLECYSTECTOMY      KNEE SURGERY Left     SHOULDER SURGERY Right      Family History   Problem Relation Age of Onset    Migraines Mother     Skin cancer Mother     Cancer Mother         skin    Melanoma Mother     Hypertension Father     Diabetes Maternal Grandmother     Breast cancer Maternal Grandmother     Cancer Maternal Grandmother         breast    Brain cancer Paternal Grandfather     Cancer Paternal Grandfather         brain    Ovarian cancer Neg Hx     Colon cancer Neg Hx     Eclampsia Neg Hx     Miscarriages / Stillbirths Neg Hx      labor Neg Hx     Stroke Neg Hx      Social History     Tobacco Use    Smoking status: Never Smoker    Smokeless tobacco: Never Used   Substance Use Topics    Alcohol use: Not Currently     Comment: 2 per month.    Drug use: Yes     Review of Systems   Constitutional: Positive for fatigue. Negative for chills and fever.   HENT: Negative for  sinus pain and sore throat.    Eyes: Negative for photophobia and visual disturbance.   Respiratory: Negative for chest tightness and shortness of breath.    Cardiovascular: Negative for chest pain and palpitations.   Gastrointestinal: Negative for abdominal distention, abdominal pain, nausea and vomiting.   Genitourinary: Negative for dysuria, flank pain, pelvic pain, vaginal bleeding and vaginal discharge.   Musculoskeletal: Negative for back pain.   Skin: Negative for rash.   Neurological: Positive for headaches. Negative for dizziness and weakness.   Hematological: Does not bruise/bleed easily.       Physical Exam     Initial Vitals   BP Pulse Resp Temp SpO2   07/14/20 0232 07/14/20 0232 07/14/20 0447 -- 07/14/20 0234   (!) 145/79 (!) 49 18  98 %      MAP       --              BP (!) 140/82   Pulse (!) 53   Resp 18   SpO2 99%     Physical Exam    Vitals reviewed.  Constitutional: She appears well-developed and well-nourished. She is not diaphoretic. No distress.   HENT:   Head: Normocephalic and atraumatic.   Eyes: EOM are normal.   Neck: Normal range of motion.   Non-TTP throughout area of HA   Cardiovascular: Normal rate.   Pulmonary/Chest: No respiratory distress.   Abdominal: Soft. Bowel sounds are normal. She exhibits no distension and no mass. There is no abdominal tenderness. There is no rebound and no guarding.   Genitourinary:    Uterus normal.     Musculoskeletal: Normal range of motion. No tenderness or edema.   Neurological: She is alert and oriented to person, place, and time.   Skin: Skin is warm and dry. No rash noted.   Psychiatric: She has a normal mood and affect.         ED Course   Procedures  Labs Reviewed   CBC W/ AUTO DIFFERENTIAL - Abnormal; Notable for the following components:       Result Value    RBC 3.23 (*)     Hemoglobin 10.5 (*)     Hematocrit 30.9 (*)     Mean Corpuscular Hemoglobin 32.5 (*)     Gran% 75.9 (*)     Lymph% 15.6 (*)     All other components within normal limits    COMPREHENSIVE METABOLIC PANEL - Abnormal; Notable for the following components:    Potassium 3.4 (*)     Calcium 8.5 (*)     Albumin 3.0 (*)     All other components within normal limits          Imaging Results    None          Medical Decision Making:   ED Management:  VA with mild range BP 140s systolic on admission.   No s/s of pre-e other than HA. PE with no LE swelling  CBC and CMP collected- both wnl  Compazine given for headache- resolving  Mild range pressures in low 140s systolic - not meeting criteria for HTN agent at this time  Will dc home with follow up in 1 week for BP check              Attending Attestation:   Physician Attestation Statement for Resident:  As the supervising MD   Physician Attestation Statement: I have personally seen and examined this patient.   I agree with the above history. -:   As the supervising MD I agree with the above PE.    As the supervising MD I agree with the above treatment, course, plan, and disposition.   -: Patient evaluated and found to be stable, agree with resident's assessment and plan.  I was personally present during the critical portions of the procedure(s) performed by the resident and was immediately available in the ED to provide services and assistance as needed during the entire procedure.  I have reviewed and agree with the residents interpretation of the following: lab data.  I have reviewed the following: old records at this facility.                                  Clinical Impression:       ICD-10-CM ICD-9-CM   1. Elevated blood pressure reading  R03.0 796.2   2. Acute nonintractable headache, unspecified headache type  R51 784.0         Disposition:   Disposition: Discharged  Condition: Stable     ED Disposition Condition    Discharge Stable        ED Prescriptions     Medication Sig Dispense Start Date End Date Auth. Provider    prochlorperazine (COMPAZINE) 10 MG tablet Take 1 tablet (10 mg total) by mouth every 6 (six) hours as needed. 15  tablet 7/14/2020  Gilma Guillen MD        Follow-up Information    None                                    Gilma Guillen MD  Resident  07/14/20 0457       Corie Schmidt MD  07/18/20 9860

## 2020-07-16 ENCOUNTER — HOSPITAL ENCOUNTER (EMERGENCY)
Facility: OTHER | Age: 35
Discharge: HOME | End: 2020-07-16
Attending: OBSTETRICS & GYNECOLOGY
Payer: COMMERCIAL

## 2020-07-16 VITALS
SYSTOLIC BLOOD PRESSURE: 119 MMHG | TEMPERATURE: 98 F | OXYGEN SATURATION: 96 % | DIASTOLIC BLOOD PRESSURE: 77 MMHG | HEART RATE: 70 BPM

## 2020-07-16 DIAGNOSIS — R51.9 ACUTE NONINTRACTABLE HEADACHE, UNSPECIFIED HEADACHE TYPE: Primary | ICD-10-CM

## 2020-07-16 LAB
ALBUMIN SERPL BCP-MCNC: 3.3 G/DL (ref 3.5–5.2)
ALP SERPL-CCNC: 109 U/L (ref 55–135)
ALT SERPL W/O P-5'-P-CCNC: 14 U/L (ref 10–44)
ANION GAP SERPL CALC-SCNC: 12 MMOL/L (ref 8–16)
AST SERPL-CCNC: 15 U/L (ref 10–40)
BASOPHILS # BLD AUTO: 0.02 K/UL (ref 0–0.2)
BASOPHILS NFR BLD: 0.2 % (ref 0–1.9)
BILIRUB SERPL-MCNC: 0.5 MG/DL (ref 0.1–1)
BUN SERPL-MCNC: 8 MG/DL (ref 6–20)
CALCIUM SERPL-MCNC: 8.3 MG/DL (ref 8.7–10.5)
CHLORIDE SERPL-SCNC: 109 MMOL/L (ref 95–110)
CO2 SERPL-SCNC: 21 MMOL/L (ref 23–29)
CREAT SERPL-MCNC: 0.7 MG/DL (ref 0.5–1.4)
DIFFERENTIAL METHOD: ABNORMAL
EOSINOPHIL # BLD AUTO: 0.1 K/UL (ref 0–0.5)
EOSINOPHIL NFR BLD: 1.1 % (ref 0–8)
ERYTHROCYTE [DISTWIDTH] IN BLOOD BY AUTOMATED COUNT: 12 % (ref 11.5–14.5)
EST. GFR  (AFRICAN AMERICAN): >60 ML/MIN/1.73 M^2
EST. GFR  (NON AFRICAN AMERICAN): >60 ML/MIN/1.73 M^2
GLUCOSE SERPL-MCNC: 92 MG/DL (ref 70–110)
HCT VFR BLD AUTO: 34.5 % (ref 37–48.5)
HGB BLD-MCNC: 11.7 G/DL (ref 12–16)
IMM GRANULOCYTES # BLD AUTO: 0.04 K/UL (ref 0–0.04)
IMM GRANULOCYTES NFR BLD AUTO: 0.5 % (ref 0–0.5)
LYMPHOCYTES # BLD AUTO: 1.1 K/UL (ref 1–4.8)
LYMPHOCYTES NFR BLD: 13.5 % (ref 18–48)
MCH RBC QN AUTO: 32.1 PG (ref 27–31)
MCHC RBC AUTO-ENTMCNC: 33.9 G/DL (ref 32–36)
MCV RBC AUTO: 95 FL (ref 82–98)
MONOCYTES # BLD AUTO: 0.5 K/UL (ref 0.3–1)
MONOCYTES NFR BLD: 5.7 % (ref 4–15)
NEUTROPHILS # BLD AUTO: 6.7 K/UL (ref 1.8–7.7)
NEUTROPHILS NFR BLD: 79 % (ref 38–73)
NRBC BLD-RTO: 0 /100 WBC
PLATELET # BLD AUTO: 342 K/UL (ref 150–350)
PMV BLD AUTO: 9.6 FL (ref 9.2–12.9)
POTASSIUM SERPL-SCNC: 3.4 MMOL/L (ref 3.5–5.1)
PROT SERPL-MCNC: 6.5 G/DL (ref 6–8.4)
RBC # BLD AUTO: 3.65 M/UL (ref 4–5.4)
SODIUM SERPL-SCNC: 142 MMOL/L (ref 136–145)
WBC # BLD AUTO: 8.42 K/UL (ref 3.9–12.7)

## 2020-07-16 PROCEDURE — 25000003 PHARM REV CODE 250: Performed by: STUDENT IN AN ORGANIZED HEALTH CARE EDUCATION/TRAINING PROGRAM

## 2020-07-16 PROCEDURE — 80053 COMPREHEN METABOLIC PANEL: CPT

## 2020-07-16 PROCEDURE — 63600175 PHARM REV CODE 636 W HCPCS: Performed by: OBSTETRICS & GYNECOLOGY

## 2020-07-16 PROCEDURE — 99284 PR EMERGENCY DEPT VISIT,LEVEL IV: ICD-10-PCS | Mod: 24,,, | Performed by: OBSTETRICS & GYNECOLOGY

## 2020-07-16 PROCEDURE — 25000003 PHARM REV CODE 250: Performed by: OBSTETRICS & GYNECOLOGY

## 2020-07-16 PROCEDURE — 85025 COMPLETE CBC W/AUTO DIFF WBC: CPT

## 2020-07-16 PROCEDURE — 99283 EMERGENCY DEPT VISIT LOW MDM: CPT

## 2020-07-16 PROCEDURE — 99284 EMERGENCY DEPT VISIT MOD MDM: CPT | Mod: 24,,, | Performed by: OBSTETRICS & GYNECOLOGY

## 2020-07-16 RX ORDER — LANOLIN ALCOHOL/MO/W.PET/CERES
400 CREAM (GRAM) TOPICAL ONCE
Status: COMPLETED | OUTPATIENT
Start: 2020-07-16 | End: 2020-07-16

## 2020-07-16 RX ORDER — ACETAMINOPHEN 500 MG
1000 TABLET ORAL ONCE
Status: COMPLETED | OUTPATIENT
Start: 2020-07-16 | End: 2020-07-16

## 2020-07-16 RX ORDER — PROCHLORPERAZINE MALEATE 5 MG
10 TABLET ORAL ONCE
Status: COMPLETED | OUTPATIENT
Start: 2020-07-16 | End: 2020-07-16

## 2020-07-16 RX ORDER — PROCHLORPERAZINE MALEATE 10 MG
10 TABLET ORAL EVERY 6 HOURS PRN
Qty: 15 TABLET | Refills: 0 | Status: SHIPPED | OUTPATIENT
Start: 2020-07-16 | End: 2020-07-26

## 2020-07-16 RX ADMIN — PROCHLORPERAZINE MALEATE 10 MG: 5 TABLET ORAL at 01:07

## 2020-07-16 RX ADMIN — Medication 400 MG: at 02:07

## 2020-07-16 RX ADMIN — ACETAMINOPHEN 1000 MG: 500 TABLET ORAL at 01:07

## 2020-07-16 NOTE — ED PROVIDER NOTES
"Encounter Date: 2020       History     Chief Complaint   Patient presents with    Hypertension     HPI   Christie Bay is a 35 y.o.  s/p  on 20 in an uncomplicated pregnancy who presents complaining of headache and high blood pressure. She came into ED 2 days ago complaining of headache and was D/C'd for not meeting pre-E criteria. She describes her current headache as pulsating, originating in the occipital region radiating to her left ear and upper shoulders which started 2 days ago. She rates the headache pain 7/10 which is worse when lying down and took ibuprofen and tylenol last night with no relief. Of note, she also reports seeing a "black shiny" area in her left lower quadrant of vision 3 days ago that resolved with tylenol and lying down.    Worried about her headache, the patient contacted her obstetrician and was referred into [facility] for monitoring after an elevated home blood pressure reading (156/96).     She denies any SOB, CP, RUQ pain, or increased swelling.     Review of patient's allergies indicates:   Allergen Reactions    Codeine Anaphylaxis    Demerol [meperidine] Anaphylaxis    Morphine Anaphylaxis     Past Medical History:   Diagnosis Date    History of shingles     Migraine headache     Ulcer      Past Surgical History:   Procedure Laterality Date    CHOLECYSTECTOMY      KNEE SURGERY Left     SHOULDER SURGERY Right      Family History   Problem Relation Age of Onset    Migraines Mother     Skin cancer Mother     Cancer Mother         skin    Melanoma Mother     Hypertension Father     Diabetes Maternal Grandmother     Breast cancer Maternal Grandmother     Cancer Maternal Grandmother         breast    Brain cancer Paternal Grandfather     Cancer Paternal Grandfather         brain    Ovarian cancer Neg Hx     Colon cancer Neg Hx     Eclampsia Neg Hx     Miscarriages / Stillbirths Neg Hx      labor Neg Hx     Stroke Neg Hx  "     Social History     Tobacco Use    Smoking status: Never Smoker    Smokeless tobacco: Never Used   Substance Use Topics    Alcohol use: Not Currently     Comment: 2 per month.    Drug use: Yes     Review of Systems   Constitutional: Negative for fever.   HENT: Negative for sore throat.    Eyes: Negative for visual disturbance.   Respiratory: Negative for shortness of breath.    Cardiovascular: Negative for chest pain.   Gastrointestinal: Negative for nausea.   Genitourinary: Negative for dysuria.   Musculoskeletal: Negative for back pain.   Skin: Negative for rash.   Neurological: Positive for headaches. Negative for facial asymmetry, speech difficulty, weakness, light-headedness and numbness.   Hematological: Does not bruise/bleed easily.       Physical Exam     Initial Vitals   BP Pulse Resp Temp SpO2   07/16/20 1250 07/16/20 1250 -- 07/16/20 1250 07/16/20 1300   134/83 73  98.3 °F (36.8 °C) 96 %      MAP       --              Temp:  [98.3 °F (36.8 °C)] 98.3 °F (36.8 °C)  Pulse:  [56-78] 70  SpO2:  [95 %-97 %] 96 %  BP: (119-137)/(77-86) 119/77    Physical Exam    Vitals reviewed.  Constitutional: Vital signs are normal. She appears well-developed and well-nourished. Breathing: Normal.   HENT:   Head: Normocephalic and atraumatic.   Eyes: EOM are normal.   Neck: Normal range of motion.   Cardiovascular: Normal rate, intact distal pulses and normal pulses.   Pulmonary/Chest:   Normal work of breathing   Abdominal: Soft. She exhibits no distension. There is no abdominal tenderness.   Musculoskeletal: Normal range of motion. No edema.   Neurological: She is alert and oriented to person, place, and time. She has normal strength and normal reflexes. No cranial nerve deficit or sensory deficit.   Skin: Skin is warm and dry.   Psychiatric: Her behavior is normal.         ED Course   Procedures  Labs Reviewed   COMPREHENSIVE METABOLIC PANEL - Abnormal; Notable for the following components:       Result Value     Potassium 3.4 (*)     CO2 21 (*)     Calcium 8.3 (*)     Albumin 3.3 (*)     All other components within normal limits   CBC W/ AUTO DIFFERENTIAL - Abnormal; Notable for the following components:    RBC 3.65 (*)     Hemoglobin 11.7 (*)     Hematocrit 34.5 (*)     Mean Corpuscular Hemoglobin 32.1 (*)     Gran% 79.0 (*)     Lymph% 13.5 (*)     All other components within normal limits          Imaging Results    None          Medical Decision Making:   ED Management:  1. Pre-E Rule out  VSS: BP wnl  Pre-E labs wnl (platelets 342, Cr 0.7, AST 15, ALT 14)  HA relieved with compazine  Send home with compazine Rx 10mg    Pre-e precautions discussed with patient. Return to [facility] with any worseing symptoms. Discussed with [facility] staff and patient able to discharge to home in stable condition.        Other:   I discussed test(s) with the performing physician.              Attending Attestation:   Physician Attestation Statement for Resident:  As the supervising MD   Physician Attestation Statement: I have personally seen and examined this patient.   I agree with the above history. -:   As the supervising MD I agree with the above PE.    As the supervising MD I agree with the above treatment, course, plan, and disposition.   -: Patient evaluated and found to be stable, agree with resident's assessment of postpartum headache with no s/s gestational hypertension or pre-eclampsia responsive to PO pain management and plan to discharge to home with Tylenol and Compazine prn.  I was personally present during the critical portions of the procedure(s) performed by the resident and was immediately available in the ED to provide services and assistance as needed during the entire procedure.  I have reviewed and agree with the residents interpretation of the following: lab data.  I have reviewed the following: old records at this facility.                                  Clinical Impression:       ICD-10-CM ICD-9-CM   1. Acute  nonintractable headache, unspecified headache type  R51 784.0   2. Postpartum state  Z39.2 V24.2             ED Disposition Condition    Discharge Stable       Katherine Boecking MD   PGY 1    ED Prescriptions     Medication Sig Dispense Start Date End Date Auth. Provider    prochlorperazine (COMPAZINE) 10 MG tablet Take 1 tablet (10 mg total) by mouth every 6 (six) hours as needed. 15 tablet 7/16/2020 7/26/2020 Mayra Roche MD        Follow-up Information    None                                    Katherine C Boecking, MD  Resident  07/16/20 7182       Belgica Purcell MD  07/16/20 8042

## 2020-07-21 ENCOUNTER — POSTPARTUM VISIT (OUTPATIENT)
Dept: OBSTETRICS AND GYNECOLOGY | Facility: CLINIC | Age: 35
End: 2020-07-21
Payer: COMMERCIAL

## 2020-07-21 VITALS
HEIGHT: 66 IN | SYSTOLIC BLOOD PRESSURE: 106 MMHG | DIASTOLIC BLOOD PRESSURE: 64 MMHG | WEIGHT: 162.69 LBS | BODY MASS INDEX: 26.14 KG/M2

## 2020-07-21 PROCEDURE — 99999 PR PBB SHADOW E&M-EST. PATIENT-LVL III: CPT | Mod: PBBFAC,,, | Performed by: OBSTETRICS & GYNECOLOGY

## 2020-07-21 PROCEDURE — 99024 PR POST-OP FOLLOW-UP VISIT: ICD-10-PCS | Mod: CPTII,S$GLB,, | Performed by: OBSTETRICS & GYNECOLOGY

## 2020-07-21 PROCEDURE — 99024 POSTOP FOLLOW-UP VISIT: CPT | Mod: CPTII,S$GLB,, | Performed by: OBSTETRICS & GYNECOLOGY

## 2020-07-21 PROCEDURE — 99999 PR PBB SHADOW E&M-EST. PATIENT-LVL III: ICD-10-PCS | Mod: PBBFAC,,, | Performed by: OBSTETRICS & GYNECOLOGY

## 2020-07-21 NOTE — PROGRESS NOTES
Ms. Bay presents today for BP check.  She denies headache, spots in her vision, or RUQ pain.  She is doing well.  Last headache was three days ago, resolved with compazine.  Overall, feeling much improved.      Vitals:    20 0807   BP: 106/64        Gen: NAD  Heart: RRR  Lungs: CTA  Ext: minimal swelling, reflexes 2+    Assessment: Ms. Bay is three weeks pp from an .  - RTC for PP visit    Lashawn Mendez

## 2020-07-30 ENCOUNTER — TELEPHONE (OUTPATIENT)
Dept: OBSTETRICS AND GYNECOLOGY | Facility: CLINIC | Age: 35
End: 2020-07-30

## 2020-07-30 NOTE — TELEPHONE ENCOUNTER
Patient called.  Discussed pain in left breast that is sharp, located primarily in the upper outer left breast and behind the nipple, and starts with breastfeeding, continues after breastfeeding/pumping.  No fever or chills.  Slightly red, but not excessive.  This has been going for a few days.  Discussed possibility of clogged duct.  Discussed massaging while pumping/feeding, NSAIDs. Will notify me if no improvement.    Lashawn Mendez

## 2020-08-11 ENCOUNTER — POSTPARTUM VISIT (OUTPATIENT)
Dept: OBSTETRICS AND GYNECOLOGY | Facility: CLINIC | Age: 35
End: 2020-08-11
Payer: COMMERCIAL

## 2020-08-11 VITALS
SYSTOLIC BLOOD PRESSURE: 102 MMHG | BODY MASS INDEX: 25.68 KG/M2 | HEIGHT: 66 IN | DIASTOLIC BLOOD PRESSURE: 66 MMHG | WEIGHT: 159.81 LBS

## 2020-08-11 PROCEDURE — 99999 PR PBB SHADOW E&M-EST. PATIENT-LVL III: CPT | Mod: PBBFAC,,, | Performed by: OBSTETRICS & GYNECOLOGY

## 2020-08-11 PROCEDURE — 0503F POSTPARTUM CARE VISIT: CPT | Mod: S$GLB,,, | Performed by: OBSTETRICS & GYNECOLOGY

## 2020-08-11 PROCEDURE — 88175 CYTOPATH C/V AUTO FLUID REDO: CPT

## 2020-08-11 PROCEDURE — 99999 PR PBB SHADOW E&M-EST. PATIENT-LVL III: ICD-10-PCS | Mod: PBBFAC,,, | Performed by: OBSTETRICS & GYNECOLOGY

## 2020-08-11 PROCEDURE — 0503F PR POSTPARTUM CARE VISIT: ICD-10-PCS | Mod: S$GLB,,, | Performed by: OBSTETRICS & GYNECOLOGY

## 2020-08-11 PROCEDURE — 87624 HPV HI-RISK TYP POOLED RSLT: CPT

## 2020-08-11 NOTE — PROGRESS NOTES
Subjective:       Patient ID: Christie Bay is a 35 y.o. female.    Chief Complaint:  Postpartum Care      History of Present Illness       Christie Bay is a 35 y.o. female  presents for a postpartum visit.  She is status post  5 weeks ago.  Her hospitalization was not complicated.  She is breastfeeding.  She desires no method for contraception- not an issue.  She denies postpartum depression.    Her last pap was 2017- normal    Review of Systems  Review of Systems   Constitutional: Negative for appetite change.   Eyes: Negative for visual disturbance.   Respiratory: Negative for cough and shortness of breath.    Cardiovascular: Negative for chest pain.   Gastrointestinal: Negative for abdominal pain, constipation and diarrhea.   Genitourinary: Negative for difficulty urinating, dysuria, frequency, genital sores, pelvic pain, vaginal bleeding, vaginal discharge and vaginal pain.   Neurological: Negative for dizziness, light-headedness and headaches.   Psychiatric/Behavioral: Negative for dysphoric mood.           Objective:   Physical Exam  Vitals signs and nursing note reviewed.   Constitutional:       Appearance: She is well-developed.   Pulmonary:      Effort: Pulmonary effort is normal.   Abdominal:      Palpations: Abdomen is soft.   Genitourinary:     Labia:         Right: No rash, tenderness, lesion or injury.         Left: No rash, tenderness, lesion or injury.       Vagina: Normal. No signs of injury and foreign body. No vaginal discharge, erythema, tenderness or bleeding.      Cervix: No cervical motion tenderness, discharge or friability.      Uterus: Not deviated, not enlarged, not fixed and not tender.       Adnexa:         Right: No mass, tenderness or fullness.          Left: No mass, tenderness or fullness.        Comments: Urethra: normal appearing urethra with no masses, tenderness or lesions  Urethral meatus: normal size, anterior vaginal wall with no  prolapse, no lesions  Neurological:      Mental Status: She is alert and oriented to person, place, and time.   Psychiatric:         Behavior: Behavior normal.         Thought Content: Thought content normal.         Judgment: Judgment normal.           Assessment:        1. Postpartum state               Plan:        Orders Placed This Encounter   Procedures    HPV High Risk Genotypes, PCR          Breastfeeding: yes  Postpartum Depression: none  Contraception: none needed  Pap smear: with hpv today    Follow up for annual exam    Lashawn Mendez MD  [facility]  [phone]

## 2020-08-21 LAB
HPV HR 12 DNA SPEC QL NAA+PROBE: NEGATIVE
HPV16 AG SPEC QL: NEGATIVE
HPV18 DNA SPEC QL NAA+PROBE: NEGATIVE

## 2020-08-26 LAB
FINAL PATHOLOGIC DIAGNOSIS: NORMAL
Lab: NORMAL

## 2020-09-22 ENCOUNTER — PATIENT MESSAGE (OUTPATIENT)
Dept: OBSTETRICS AND GYNECOLOGY | Facility: CLINIC | Age: 35
End: 2020-09-22

## 2020-10-05 ENCOUNTER — PATIENT MESSAGE (OUTPATIENT)
Dept: ADMINISTRATIVE | Facility: HOSPITAL | Age: 35
End: 2020-10-05

## 2020-11-19 ENCOUNTER — CLINICAL SUPPORT (OUTPATIENT)
Dept: URGENT CARE | Facility: CLINIC | Age: 35
End: 2020-11-19
Payer: COMMERCIAL

## 2020-11-19 VITALS — TEMPERATURE: 98 F

## 2020-11-19 DIAGNOSIS — J02.9 SORE THROAT: ICD-10-CM

## 2020-11-19 DIAGNOSIS — J02.9 SORE THROAT: Primary | ICD-10-CM

## 2020-11-19 LAB
CTP QC/QA: YES
SARS-COV-2 RDRP RESP QL NAA+PROBE: NEGATIVE

## 2020-11-19 PROCEDURE — U0002 COVID-19 LAB TEST NON-CDC: HCPCS | Mod: QW,S$GLB,, | Performed by: INTERNAL MEDICINE

## 2020-11-19 PROCEDURE — U0002: ICD-10-PCS | Mod: QW,S$GLB,, | Performed by: INTERNAL MEDICINE

## 2020-11-20 ENCOUNTER — TELEPHONE (OUTPATIENT)
Dept: PRIMARY CARE CLINIC | Facility: CLINIC | Age: 35
End: 2020-11-20

## 2021-01-04 ENCOUNTER — PATIENT MESSAGE (OUTPATIENT)
Dept: ADMINISTRATIVE | Facility: HOSPITAL | Age: 36
End: 2021-01-04

## 2021-03-09 ENCOUNTER — OCCUPATIONAL HEALTH (OUTPATIENT)
Dept: URGENT CARE | Facility: CLINIC | Age: 36
End: 2021-03-09

## 2021-03-09 ENCOUNTER — TELEPHONE (OUTPATIENT)
Dept: PRIMARY CARE CLINIC | Facility: OTHER | Age: 36
End: 2021-03-09

## 2021-03-09 VITALS — TEMPERATURE: 98 F

## 2021-03-09 DIAGNOSIS — R19.7 DIARRHEA, UNSPECIFIED TYPE: ICD-10-CM

## 2021-03-09 DIAGNOSIS — R19.7 DIARRHEA, UNSPECIFIED TYPE: Primary | ICD-10-CM

## 2021-03-09 LAB
CTP QC/QA: YES
SARS-COV-2 RDRP RESP QL NAA+PROBE: NEGATIVE

## 2021-03-09 PROCEDURE — U0002 COVID-19 LAB TEST NON-CDC: HCPCS | Mod: QW,S$GLB,, | Performed by: PREVENTIVE MEDICINE

## 2021-03-09 PROCEDURE — U0002: ICD-10-PCS | Mod: QW,S$GLB,, | Performed by: PREVENTIVE MEDICINE

## 2021-03-30 ENCOUNTER — CLINICAL SUPPORT (OUTPATIENT)
Dept: OTHER | Facility: CLINIC | Age: 36
End: 2021-03-30
Payer: COMMERCIAL

## 2021-03-30 DIAGNOSIS — Z00.8 ENCOUNTER FOR OTHER GENERAL EXAMINATION: ICD-10-CM

## 2021-03-31 VITALS — HEIGHT: 66 IN | BODY MASS INDEX: 25.8 KG/M2

## 2021-03-31 LAB
GLUCOSE SERPL-MCNC: 82 MG/DL (ref 60–140)
HDLC SERPL-MCNC: 60 MG/DL
POC CHOLESTEROL, TOTAL: 175 MG/DL
TRIGL SERPL-MCNC: 44 MG/DL

## 2021-04-05 ENCOUNTER — PATIENT MESSAGE (OUTPATIENT)
Dept: ADMINISTRATIVE | Facility: HOSPITAL | Age: 36
End: 2021-04-05

## 2021-04-06 ENCOUNTER — OFFICE VISIT (OUTPATIENT)
Dept: FAMILY MEDICINE | Facility: CLINIC | Age: 36
End: 2021-04-06
Payer: COMMERCIAL

## 2021-04-06 VITALS
SYSTOLIC BLOOD PRESSURE: 120 MMHG | HEART RATE: 67 BPM | HEIGHT: 66 IN | OXYGEN SATURATION: 98 % | WEIGHT: 162 LBS | RESPIRATION RATE: 18 BRPM | DIASTOLIC BLOOD PRESSURE: 68 MMHG | TEMPERATURE: 98 F | BODY MASS INDEX: 26.03 KG/M2

## 2021-04-06 DIAGNOSIS — R19.7 DIARRHEA, UNSPECIFIED TYPE: Primary | ICD-10-CM

## 2021-04-06 PROCEDURE — 99214 OFFICE O/P EST MOD 30 MIN: CPT | Mod: S$GLB,,, | Performed by: FAMILY MEDICINE

## 2021-04-06 PROCEDURE — 99999 PR PBB SHADOW E&M-EST. PATIENT-LVL IV: CPT | Mod: PBBFAC,,, | Performed by: FAMILY MEDICINE

## 2021-04-06 PROCEDURE — 1126F AMNT PAIN NOTED NONE PRSNT: CPT | Mod: S$GLB,,, | Performed by: FAMILY MEDICINE

## 2021-04-06 PROCEDURE — 3008F PR BODY MASS INDEX (BMI) DOCUMENTED: ICD-10-PCS | Mod: CPTII,S$GLB,, | Performed by: FAMILY MEDICINE

## 2021-04-06 PROCEDURE — 99214 PR OFFICE/OUTPT VISIT, EST, LEVL IV, 30-39 MIN: ICD-10-PCS | Mod: S$GLB,,, | Performed by: FAMILY MEDICINE

## 2021-04-06 PROCEDURE — 1126F PR PAIN SEVERITY QUANTIFIED, NO PAIN PRESENT: ICD-10-PCS | Mod: S$GLB,,, | Performed by: FAMILY MEDICINE

## 2021-04-06 PROCEDURE — 3008F BODY MASS INDEX DOCD: CPT | Mod: CPTII,S$GLB,, | Performed by: FAMILY MEDICINE

## 2021-04-06 PROCEDURE — 99999 PR PBB SHADOW E&M-EST. PATIENT-LVL IV: ICD-10-PCS | Mod: PBBFAC,,, | Performed by: FAMILY MEDICINE

## 2021-04-06 RX ORDER — DOCUSATE SODIUM 100 MG/1
100 CAPSULE, LIQUID FILLED ORAL 2 TIMES DAILY PRN
Qty: 10 CAPSULE | Refills: 0 | Status: SHIPPED | OUTPATIENT
Start: 2021-04-06 | End: 2021-05-03

## 2021-04-12 ENCOUNTER — PATIENT MESSAGE (OUTPATIENT)
Dept: FAMILY MEDICINE | Facility: CLINIC | Age: 36
End: 2021-04-12

## 2021-05-03 ENCOUNTER — OFFICE VISIT (OUTPATIENT)
Dept: URGENT CARE | Facility: CLINIC | Age: 36
End: 2021-05-03
Payer: COMMERCIAL

## 2021-05-03 VITALS
OXYGEN SATURATION: 98 % | BODY MASS INDEX: 26.2 KG/M2 | HEIGHT: 66 IN | SYSTOLIC BLOOD PRESSURE: 116 MMHG | TEMPERATURE: 98 F | RESPIRATION RATE: 14 BRPM | DIASTOLIC BLOOD PRESSURE: 81 MMHG | WEIGHT: 163 LBS | HEART RATE: 73 BPM

## 2021-05-03 DIAGNOSIS — R05.9 COUGH: ICD-10-CM

## 2021-05-03 DIAGNOSIS — J40 BRONCHITIS: Primary | ICD-10-CM

## 2021-05-03 LAB
CTP QC/QA: YES
SARS-COV-2 RDRP RESP QL NAA+PROBE: NEGATIVE

## 2021-05-03 PROCEDURE — 99214 OFFICE O/P EST MOD 30 MIN: CPT | Mod: S$GLB,,, | Performed by: PHYSICIAN ASSISTANT

## 2021-05-03 PROCEDURE — U0002 COVID-19 LAB TEST NON-CDC: HCPCS | Mod: QW,S$GLB,, | Performed by: PHYSICIAN ASSISTANT

## 2021-05-03 PROCEDURE — U0002: ICD-10-PCS | Mod: QW,S$GLB,, | Performed by: PHYSICIAN ASSISTANT

## 2021-05-03 PROCEDURE — 99214 PR OFFICE/OUTPT VISIT, EST, LEVL IV, 30-39 MIN: ICD-10-PCS | Mod: S$GLB,,, | Performed by: PHYSICIAN ASSISTANT

## 2021-05-03 PROCEDURE — 3008F BODY MASS INDEX DOCD: CPT | Mod: CPTII,S$GLB,, | Performed by: PHYSICIAN ASSISTANT

## 2021-05-03 PROCEDURE — 3008F PR BODY MASS INDEX (BMI) DOCUMENTED: ICD-10-PCS | Mod: CPTII,S$GLB,, | Performed by: PHYSICIAN ASSISTANT

## 2021-05-03 RX ORDER — PROMETHAZINE HYDROCHLORIDE AND DEXTROMETHORPHAN HYDROBROMIDE 6.25; 15 MG/5ML; MG/5ML
5 SYRUP ORAL
Qty: 118 ML | Refills: 0 | Status: SHIPPED | OUTPATIENT
Start: 2021-05-03 | End: 2021-08-19 | Stop reason: ALTCHOICE

## 2021-05-03 RX ORDER — DOXYCYCLINE HYCLATE 100 MG
100 TABLET ORAL 2 TIMES DAILY
Qty: 14 TABLET | Refills: 0 | Status: SHIPPED | OUTPATIENT
Start: 2021-05-03 | End: 2021-08-19

## 2021-05-03 RX ORDER — ALBUTEROL SULFATE 90 UG/1
2 AEROSOL, METERED RESPIRATORY (INHALATION) EVERY 4 HOURS PRN
Qty: 18 G | Refills: 0 | Status: SHIPPED | OUTPATIENT
Start: 2021-05-03 | End: 2021-08-19

## 2021-05-06 ENCOUNTER — PATIENT MESSAGE (OUTPATIENT)
Dept: RESEARCH | Facility: HOSPITAL | Age: 36
End: 2021-05-06

## 2021-05-10 ENCOUNTER — LAB VISIT (OUTPATIENT)
Dept: LAB | Facility: HOSPITAL | Age: 36
End: 2021-05-10
Attending: OBSTETRICS & GYNECOLOGY
Payer: COMMERCIAL

## 2021-05-10 DIAGNOSIS — Z11.59 SPECIAL SCREENING EXAMINATION FOR UNSPECIFIED VIRAL DISEASE: ICD-10-CM

## 2021-05-10 DIAGNOSIS — Z31.49 OTHER INVESTIGATION AND TESTING FOR PROCREATIVE MANAGEMENT: ICD-10-CM

## 2021-05-10 LAB
ABO + RH BLD: NORMAL
BASOPHILS # BLD AUTO: 0.04 K/UL (ref 0–0.2)
BASOPHILS NFR BLD: 0.7 % (ref 0–1.9)
BLD GP AB SCN CELLS X3 SERPL QL: NORMAL
DIFFERENTIAL METHOD: ABNORMAL
EOSINOPHIL # BLD AUTO: 0.1 K/UL (ref 0–0.5)
EOSINOPHIL NFR BLD: 0.9 % (ref 0–8)
ERYTHROCYTE [DISTWIDTH] IN BLOOD BY AUTOMATED COUNT: 11.9 % (ref 11.5–14.5)
HCT VFR BLD AUTO: 39.4 % (ref 37–48.5)
HGB BLD-MCNC: 13.6 G/DL (ref 12–16)
IMM GRANULOCYTES # BLD AUTO: 0.01 K/UL (ref 0–0.04)
IMM GRANULOCYTES NFR BLD AUTO: 0.2 % (ref 0–0.5)
LYMPHOCYTES # BLD AUTO: 1.6 K/UL (ref 1–4.8)
LYMPHOCYTES NFR BLD: 27.7 % (ref 18–48)
MCH RBC QN AUTO: 32.8 PG (ref 27–31)
MCHC RBC AUTO-ENTMCNC: 34.5 G/DL (ref 32–36)
MCV RBC AUTO: 95 FL (ref 82–98)
MONOCYTES # BLD AUTO: 0.4 K/UL (ref 0.3–1)
MONOCYTES NFR BLD: 6.8 % (ref 4–15)
NEUTROPHILS # BLD AUTO: 3.8 K/UL (ref 1.8–7.7)
NEUTROPHILS NFR BLD: 63.7 % (ref 38–73)
NRBC BLD-RTO: 0 /100 WBC
PLATELET # BLD AUTO: 196 K/UL (ref 150–450)
PMV BLD AUTO: 10.8 FL (ref 9.2–12.9)
RBC # BLD AUTO: 4.15 M/UL (ref 4–5.4)
THYROPEROXIDASE IGG SERPL-ACNC: <6 IU/ML
TSH SERPL DL<=0.005 MIU/L-ACNC: 0.81 UIU/ML (ref 0.4–4)
WBC # BLD AUTO: 5.88 K/UL (ref 3.9–12.7)

## 2021-05-10 PROCEDURE — 84443 ASSAY THYROID STIM HORMONE: CPT | Performed by: OBSTETRICS & GYNECOLOGY

## 2021-05-10 PROCEDURE — 86645 CMV ANTIBODY IGM: CPT | Performed by: OBSTETRICS & GYNECOLOGY

## 2021-05-10 PROCEDURE — 86703 HIV-1/HIV-2 1 RESULT ANTBDY: CPT | Performed by: OBSTETRICS & GYNECOLOGY

## 2021-05-10 PROCEDURE — 87340 HEPATITIS B SURFACE AG IA: CPT | Performed by: OBSTETRICS & GYNECOLOGY

## 2021-05-10 PROCEDURE — 85025 COMPLETE CBC W/AUTO DIFF WBC: CPT | Performed by: OBSTETRICS & GYNECOLOGY

## 2021-05-10 PROCEDURE — 83520 IMMUNOASSAY QUANT NOS NONAB: CPT | Performed by: OBSTETRICS & GYNECOLOGY

## 2021-05-10 PROCEDURE — 86592 SYPHILIS TEST NON-TREP QUAL: CPT | Performed by: OBSTETRICS & GYNECOLOGY

## 2021-05-10 PROCEDURE — 86644 CMV ANTIBODY: CPT | Performed by: OBSTETRICS & GYNECOLOGY

## 2021-05-10 PROCEDURE — 86900 BLOOD TYPING SEROLOGIC ABO: CPT | Performed by: OBSTETRICS & GYNECOLOGY

## 2021-05-10 PROCEDURE — 86704 HEP B CORE ANTIBODY TOTAL: CPT | Performed by: OBSTETRICS & GYNECOLOGY

## 2021-05-10 PROCEDURE — 86803 HEPATITIS C AB TEST: CPT | Performed by: OBSTETRICS & GYNECOLOGY

## 2021-05-10 PROCEDURE — 86376 MICROSOMAL ANTIBODY EACH: CPT | Performed by: OBSTETRICS & GYNECOLOGY

## 2021-05-10 PROCEDURE — 36415 COLL VENOUS BLD VENIPUNCTURE: CPT | Performed by: OBSTETRICS & GYNECOLOGY

## 2021-05-11 LAB
CMV IGG SERPL QL IA: NORMAL
HBV CORE AB SERPL QL IA: NEGATIVE
HBV SURFACE AG SERPL QL IA: NEGATIVE
HCV AB SERPL QL IA: NEGATIVE
HIV 1+2 AB+HIV1 P24 AG SERPL QL IA: NEGATIVE
MIS SERPL-MCNC: 6.5 NG/ML (ref 0.15–7.5)
RPR SER QL: NORMAL

## 2021-05-13 LAB — CMV IGM SERPL IA-ACNC: <8 AU/ML

## 2021-05-19 ENCOUNTER — SPECIALTY PHARMACY (OUTPATIENT)
Dept: PHARMACY | Facility: CLINIC | Age: 36
End: 2021-05-19

## 2021-06-11 ENCOUNTER — SPECIALTY PHARMACY (OUTPATIENT)
Dept: PHARMACY | Facility: CLINIC | Age: 36
End: 2021-06-11

## 2021-07-12 ENCOUNTER — OFFICE VISIT (OUTPATIENT)
Dept: URGENT CARE | Facility: CLINIC | Age: 36
End: 2021-07-12
Payer: COMMERCIAL

## 2021-07-12 VITALS — BODY MASS INDEX: 26.2 KG/M2 | WEIGHT: 163 LBS | HEIGHT: 66 IN

## 2021-07-12 DIAGNOSIS — J02.9 SORE THROAT: ICD-10-CM

## 2021-07-12 DIAGNOSIS — R09.81 SINUS CONGESTION: Primary | ICD-10-CM

## 2021-07-12 DIAGNOSIS — J06.9 ACUTE URI: ICD-10-CM

## 2021-07-12 LAB
CTP QC/QA: YES
SARS-COV-2 RDRP RESP QL NAA+PROBE: NEGATIVE

## 2021-07-12 PROCEDURE — 3008F PR BODY MASS INDEX (BMI) DOCUMENTED: ICD-10-PCS | Mod: CPTII,S$GLB,, | Performed by: FAMILY MEDICINE

## 2021-07-12 PROCEDURE — 99214 OFFICE O/P EST MOD 30 MIN: CPT | Mod: S$GLB,,, | Performed by: FAMILY MEDICINE

## 2021-07-12 PROCEDURE — U0002 COVID-19 LAB TEST NON-CDC: HCPCS | Mod: QW,S$GLB,, | Performed by: FAMILY MEDICINE

## 2021-07-12 PROCEDURE — 3008F BODY MASS INDEX DOCD: CPT | Mod: CPTII,S$GLB,, | Performed by: FAMILY MEDICINE

## 2021-07-12 PROCEDURE — U0002: ICD-10-PCS | Mod: QW,S$GLB,, | Performed by: FAMILY MEDICINE

## 2021-07-12 PROCEDURE — 99214 PR OFFICE/OUTPT VISIT, EST, LEVL IV, 30-39 MIN: ICD-10-PCS | Mod: S$GLB,,, | Performed by: FAMILY MEDICINE

## 2021-08-18 ENCOUNTER — OFFICE VISIT (OUTPATIENT)
Dept: URGENT CARE | Facility: CLINIC | Age: 36
End: 2021-08-18
Payer: COMMERCIAL

## 2021-08-18 VITALS
DIASTOLIC BLOOD PRESSURE: 83 MMHG | WEIGHT: 147 LBS | OXYGEN SATURATION: 98 % | BODY MASS INDEX: 23.63 KG/M2 | RESPIRATION RATE: 16 BRPM | HEIGHT: 66 IN | SYSTOLIC BLOOD PRESSURE: 117 MMHG | HEART RATE: 118 BPM | TEMPERATURE: 100 F

## 2021-08-18 DIAGNOSIS — J02.9 VIRAL PHARYNGITIS: Primary | ICD-10-CM

## 2021-08-18 LAB
CTP QC/QA: YES
CTP QC/QA: YES
MOLECULAR STREP A: NEGATIVE
SARS-COV-2 RDRP RESP QL NAA+PROBE: NEGATIVE

## 2021-08-18 PROCEDURE — 99213 OFFICE O/P EST LOW 20 MIN: CPT | Mod: S$GLB,,, | Performed by: PHYSICIAN ASSISTANT

## 2021-08-18 PROCEDURE — 3074F SYST BP LT 130 MM HG: CPT | Mod: CPTII,S$GLB,, | Performed by: PHYSICIAN ASSISTANT

## 2021-08-18 PROCEDURE — 1159F PR MEDICATION LIST DOCUMENTED IN MEDICAL RECORD: ICD-10-PCS | Mod: CPTII,S$GLB,, | Performed by: PHYSICIAN ASSISTANT

## 2021-08-18 PROCEDURE — 87651 STREP A DNA AMP PROBE: CPT | Mod: QW,S$GLB,, | Performed by: PHYSICIAN ASSISTANT

## 2021-08-18 PROCEDURE — 3008F PR BODY MASS INDEX (BMI) DOCUMENTED: ICD-10-PCS | Mod: CPTII,S$GLB,, | Performed by: PHYSICIAN ASSISTANT

## 2021-08-18 PROCEDURE — 1160F PR REVIEW ALL MEDS BY PRESCRIBER/CLIN PHARMACIST DOCUMENTED: ICD-10-PCS | Mod: CPTII,S$GLB,, | Performed by: PHYSICIAN ASSISTANT

## 2021-08-18 PROCEDURE — 1160F RVW MEDS BY RX/DR IN RCRD: CPT | Mod: CPTII,S$GLB,, | Performed by: PHYSICIAN ASSISTANT

## 2021-08-18 PROCEDURE — 3079F PR MOST RECENT DIASTOLIC BLOOD PRESSURE 80-89 MM HG: ICD-10-PCS | Mod: CPTII,S$GLB,, | Performed by: PHYSICIAN ASSISTANT

## 2021-08-18 PROCEDURE — 1159F MED LIST DOCD IN RCRD: CPT | Mod: CPTII,S$GLB,, | Performed by: PHYSICIAN ASSISTANT

## 2021-08-18 PROCEDURE — 3079F DIAST BP 80-89 MM HG: CPT | Mod: CPTII,S$GLB,, | Performed by: PHYSICIAN ASSISTANT

## 2021-08-18 PROCEDURE — 3008F BODY MASS INDEX DOCD: CPT | Mod: CPTII,S$GLB,, | Performed by: PHYSICIAN ASSISTANT

## 2021-08-18 PROCEDURE — 3074F PR MOST RECENT SYSTOLIC BLOOD PRESSURE < 130 MM HG: ICD-10-PCS | Mod: CPTII,S$GLB,, | Performed by: PHYSICIAN ASSISTANT

## 2021-08-18 PROCEDURE — U0002 COVID-19 LAB TEST NON-CDC: HCPCS | Mod: QW,S$GLB,, | Performed by: PHYSICIAN ASSISTANT

## 2021-08-18 PROCEDURE — 99213 PR OFFICE/OUTPT VISIT, EST, LEVL III, 20-29 MIN: ICD-10-PCS | Mod: S$GLB,,, | Performed by: PHYSICIAN ASSISTANT

## 2021-08-18 PROCEDURE — 87651 POCT STREP A MOLECULAR: ICD-10-PCS | Mod: QW,S$GLB,, | Performed by: PHYSICIAN ASSISTANT

## 2021-08-18 PROCEDURE — U0002: ICD-10-PCS | Mod: QW,S$GLB,, | Performed by: PHYSICIAN ASSISTANT

## 2021-08-19 ENCOUNTER — LAB VISIT (OUTPATIENT)
Dept: SPORTS MEDICINE | Facility: CLINIC | Age: 36
End: 2021-08-19
Payer: COMMERCIAL

## 2021-08-19 DIAGNOSIS — Z20.822 EXPOSURE TO COVID-19 VIRUS: Primary | ICD-10-CM

## 2021-08-19 DIAGNOSIS — Z20.822 EXPOSURE TO COVID-19 VIRUS: ICD-10-CM

## 2021-08-19 PROCEDURE — U0005 INFEC AGEN DETEC AMPLI PROBE: HCPCS | Performed by: ORTHOPAEDIC SURGERY

## 2021-08-19 PROCEDURE — U0003 INFECTIOUS AGENT DETECTION BY NUCLEIC ACID (DNA OR RNA); SEVERE ACUTE RESPIRATORY SYNDROME CORONAVIRUS 2 (SARS-COV-2) (CORONAVIRUS DISEASE [COVID-19]), AMPLIFIED PROBE TECHNIQUE, MAKING USE OF HIGH THROUGHPUT TECHNOLOGIES AS DESCRIBED BY CMS-2020-01-R: HCPCS | Performed by: ORTHOPAEDIC SURGERY

## 2021-08-20 ENCOUNTER — OFFICE VISIT (OUTPATIENT)
Dept: URGENT CARE | Facility: CLINIC | Age: 36
End: 2021-08-20
Payer: COMMERCIAL

## 2021-08-20 VITALS
DIASTOLIC BLOOD PRESSURE: 75 MMHG | SYSTOLIC BLOOD PRESSURE: 108 MMHG | HEART RATE: 69 BPM | HEIGHT: 66 IN | BODY MASS INDEX: 23.63 KG/M2 | RESPIRATION RATE: 16 BRPM | TEMPERATURE: 98 F | WEIGHT: 147 LBS | OXYGEN SATURATION: 99 %

## 2021-08-20 DIAGNOSIS — J02.9 SORE THROAT: Primary | ICD-10-CM

## 2021-08-20 LAB
CTP QC/QA: YES
SARS-COV-2 RDRP RESP QL NAA+PROBE: NEGATIVE
SARS-COV-2 RNA RESP QL NAA+PROBE: NOT DETECTED
SARS-COV-2- CYCLE NUMBER: -1

## 2021-08-20 PROCEDURE — 3074F PR MOST RECENT SYSTOLIC BLOOD PRESSURE < 130 MM HG: ICD-10-PCS | Mod: CPTII,S$GLB,, | Performed by: FAMILY MEDICINE

## 2021-08-20 PROCEDURE — 1159F PR MEDICATION LIST DOCUMENTED IN MEDICAL RECORD: ICD-10-PCS | Mod: CPTII,S$GLB,, | Performed by: FAMILY MEDICINE

## 2021-08-20 PROCEDURE — U0002 COVID-19 LAB TEST NON-CDC: HCPCS | Mod: QW,S$GLB,, | Performed by: FAMILY MEDICINE

## 2021-08-20 PROCEDURE — 1160F RVW MEDS BY RX/DR IN RCRD: CPT | Mod: CPTII,S$GLB,, | Performed by: FAMILY MEDICINE

## 2021-08-20 PROCEDURE — 1160F PR REVIEW ALL MEDS BY PRESCRIBER/CLIN PHARMACIST DOCUMENTED: ICD-10-PCS | Mod: CPTII,S$GLB,, | Performed by: FAMILY MEDICINE

## 2021-08-20 PROCEDURE — 99214 OFFICE O/P EST MOD 30 MIN: CPT | Mod: S$GLB,,, | Performed by: FAMILY MEDICINE

## 2021-08-20 PROCEDURE — 3008F PR BODY MASS INDEX (BMI) DOCUMENTED: ICD-10-PCS | Mod: CPTII,S$GLB,, | Performed by: FAMILY MEDICINE

## 2021-08-20 PROCEDURE — U0002: ICD-10-PCS | Mod: QW,S$GLB,, | Performed by: FAMILY MEDICINE

## 2021-08-20 PROCEDURE — 99214 PR OFFICE/OUTPT VISIT, EST, LEVL IV, 30-39 MIN: ICD-10-PCS | Mod: S$GLB,,, | Performed by: FAMILY MEDICINE

## 2021-08-20 PROCEDURE — 3078F DIAST BP <80 MM HG: CPT | Mod: CPTII,S$GLB,, | Performed by: FAMILY MEDICINE

## 2021-08-20 PROCEDURE — 1159F MED LIST DOCD IN RCRD: CPT | Mod: CPTII,S$GLB,, | Performed by: FAMILY MEDICINE

## 2021-08-20 PROCEDURE — 3078F PR MOST RECENT DIASTOLIC BLOOD PRESSURE < 80 MM HG: ICD-10-PCS | Mod: CPTII,S$GLB,, | Performed by: FAMILY MEDICINE

## 2021-08-20 PROCEDURE — 3008F BODY MASS INDEX DOCD: CPT | Mod: CPTII,S$GLB,, | Performed by: FAMILY MEDICINE

## 2021-08-20 PROCEDURE — 3074F SYST BP LT 130 MM HG: CPT | Mod: CPTII,S$GLB,, | Performed by: FAMILY MEDICINE

## 2021-08-20 RX ORDER — BENZOCAINE/MENTHOL 6 MG-10 MG
1 LOZENGE MUCOUS MEMBRANE
Qty: 18 TABLET | Refills: 0 | Status: SHIPPED | OUTPATIENT
Start: 2021-08-20 | End: 2021-09-29

## 2021-09-29 ENCOUNTER — PATIENT MESSAGE (OUTPATIENT)
Dept: FAMILY MEDICINE | Facility: CLINIC | Age: 36
End: 2021-09-29

## 2021-09-29 ENCOUNTER — OFFICE VISIT (OUTPATIENT)
Dept: FAMILY MEDICINE | Facility: CLINIC | Age: 36
End: 2021-09-29
Payer: COMMERCIAL

## 2021-09-29 VITALS
TEMPERATURE: 98 F | RESPIRATION RATE: 18 BRPM | BODY MASS INDEX: 25.15 KG/M2 | WEIGHT: 156.5 LBS | HEART RATE: 81 BPM | HEIGHT: 66 IN | SYSTOLIC BLOOD PRESSURE: 112 MMHG | DIASTOLIC BLOOD PRESSURE: 78 MMHG | OXYGEN SATURATION: 98 %

## 2021-09-29 DIAGNOSIS — R10.13 EPIGASTRIC PAIN: Primary | ICD-10-CM

## 2021-09-29 PROCEDURE — 1160F RVW MEDS BY RX/DR IN RCRD: CPT | Mod: CPTII,S$GLB,, | Performed by: FAMILY MEDICINE

## 2021-09-29 PROCEDURE — 99999 PR PBB SHADOW E&M-EST. PATIENT-LVL IV: CPT | Mod: PBBFAC,,, | Performed by: FAMILY MEDICINE

## 2021-09-29 PROCEDURE — 99214 OFFICE O/P EST MOD 30 MIN: CPT | Mod: S$GLB,,, | Performed by: FAMILY MEDICINE

## 2021-09-29 PROCEDURE — 3074F SYST BP LT 130 MM HG: CPT | Mod: CPTII,S$GLB,, | Performed by: FAMILY MEDICINE

## 2021-09-29 PROCEDURE — 3008F BODY MASS INDEX DOCD: CPT | Mod: CPTII,S$GLB,, | Performed by: FAMILY MEDICINE

## 2021-09-29 PROCEDURE — 3074F PR MOST RECENT SYSTOLIC BLOOD PRESSURE < 130 MM HG: ICD-10-PCS | Mod: CPTII,S$GLB,, | Performed by: FAMILY MEDICINE

## 2021-09-29 PROCEDURE — 3078F PR MOST RECENT DIASTOLIC BLOOD PRESSURE < 80 MM HG: ICD-10-PCS | Mod: CPTII,S$GLB,, | Performed by: FAMILY MEDICINE

## 2021-09-29 PROCEDURE — 99999 PR PBB SHADOW E&M-EST. PATIENT-LVL IV: ICD-10-PCS | Mod: PBBFAC,,, | Performed by: FAMILY MEDICINE

## 2021-09-29 PROCEDURE — 1160F PR REVIEW ALL MEDS BY PRESCRIBER/CLIN PHARMACIST DOCUMENTED: ICD-10-PCS | Mod: CPTII,S$GLB,, | Performed by: FAMILY MEDICINE

## 2021-09-29 PROCEDURE — 1159F MED LIST DOCD IN RCRD: CPT | Mod: CPTII,S$GLB,, | Performed by: FAMILY MEDICINE

## 2021-09-29 PROCEDURE — 99214 PR OFFICE/OUTPT VISIT, EST, LEVL IV, 30-39 MIN: ICD-10-PCS | Mod: S$GLB,,, | Performed by: FAMILY MEDICINE

## 2021-09-29 PROCEDURE — 3078F DIAST BP <80 MM HG: CPT | Mod: CPTII,S$GLB,, | Performed by: FAMILY MEDICINE

## 2021-09-29 PROCEDURE — 3008F PR BODY MASS INDEX (BMI) DOCUMENTED: ICD-10-PCS | Mod: CPTII,S$GLB,, | Performed by: FAMILY MEDICINE

## 2021-09-29 PROCEDURE — 1159F PR MEDICATION LIST DOCUMENTED IN MEDICAL RECORD: ICD-10-PCS | Mod: CPTII,S$GLB,, | Performed by: FAMILY MEDICINE

## 2021-09-29 RX ORDER — ESOMEPRAZOLE MAGNESIUM 40 MG/1
40 CAPSULE, DELAYED RELEASE ORAL
Qty: 10 CAPSULE | Refills: 0 | Status: SHIPPED | OUTPATIENT
Start: 2021-09-29 | End: 2021-12-28

## 2021-12-28 ENCOUNTER — OFFICE VISIT (OUTPATIENT)
Dept: FAMILY MEDICINE | Facility: CLINIC | Age: 36
End: 2021-12-28
Payer: COMMERCIAL

## 2021-12-28 VITALS — BODY MASS INDEX: 25.26 KG/M2 | HEIGHT: 66 IN

## 2021-12-28 DIAGNOSIS — K52.9 ENTERITIS: Primary | ICD-10-CM

## 2021-12-28 PROCEDURE — 1159F PR MEDICATION LIST DOCUMENTED IN MEDICAL RECORD: ICD-10-PCS | Mod: CPTII,S$GLB,, | Performed by: FAMILY MEDICINE

## 2021-12-28 PROCEDURE — 99999 PR PBB SHADOW E&M-EST. PATIENT-LVL III: ICD-10-PCS | Mod: PBBFAC,,, | Performed by: FAMILY MEDICINE

## 2021-12-28 PROCEDURE — 1159F MED LIST DOCD IN RCRD: CPT | Mod: CPTII,S$GLB,, | Performed by: FAMILY MEDICINE

## 2021-12-28 PROCEDURE — 3008F BODY MASS INDEX DOCD: CPT | Mod: CPTII,S$GLB,, | Performed by: FAMILY MEDICINE

## 2021-12-28 PROCEDURE — 99214 OFFICE O/P EST MOD 30 MIN: CPT | Mod: S$GLB,,, | Performed by: FAMILY MEDICINE

## 2021-12-28 PROCEDURE — 3008F PR BODY MASS INDEX (BMI) DOCUMENTED: ICD-10-PCS | Mod: CPTII,S$GLB,, | Performed by: FAMILY MEDICINE

## 2021-12-28 PROCEDURE — 99214 PR OFFICE/OUTPT VISIT, EST, LEVL IV, 30-39 MIN: ICD-10-PCS | Mod: S$GLB,,, | Performed by: FAMILY MEDICINE

## 2021-12-28 PROCEDURE — 1160F RVW MEDS BY RX/DR IN RCRD: CPT | Mod: CPTII,S$GLB,, | Performed by: FAMILY MEDICINE

## 2021-12-28 PROCEDURE — 99999 PR PBB SHADOW E&M-EST. PATIENT-LVL III: CPT | Mod: PBBFAC,,, | Performed by: FAMILY MEDICINE

## 2021-12-28 PROCEDURE — 1160F PR REVIEW ALL MEDS BY PRESCRIBER/CLIN PHARMACIST DOCUMENTED: ICD-10-PCS | Mod: CPTII,S$GLB,, | Performed by: FAMILY MEDICINE

## 2022-01-12 ENCOUNTER — SPECIALTY PHARMACY (OUTPATIENT)
Dept: PHARMACY | Facility: CLINIC | Age: 37
End: 2022-01-12
Payer: COMMERCIAL

## 2022-01-12 NOTE — TELEPHONE ENCOUNTER
New Rx's received for endometrin #21 and Pregnyl #1, $148.10 and $55.47 respectively. Will reach out to patient to assess need. Patient states that she will need both today, but is unsure as to how many Endometrin she will need. Patient states that she will contact provider to see and return call.     Peng Recio, PharmD  Clinical Pharmacist  [facility]  P: [phone]

## 2022-01-12 NOTE — TELEPHONE ENCOUNTER
Patient returning [facility]'s call regarding Endometrin and Pregnyl. Patient stated she needed 1 full box of Endometrin. Refills set up and patient is aware of copay.

## 2022-01-12 NOTE — TELEPHONE ENCOUNTER
Specialty Pharmacy - Refill Coordination    Specialty Medication Orders Linked to Encounter    Flowsheet Row Most Recent Value   Medication #1 progesterone micronized (ENDOMETRIN) 100 mg vaginal insert (Order#[id number], Rx#[id number])   Medication #2 chorionic gonadotropin, human (PREGNYL) 10,000 unit injection (Order#[id number], Rx#[id number])          Refill Questions - Documented Responses    Flowsheet Row Most Recent Value   Patient Availability and HIPAA Verification    Does patient want to proceed with activity? Yes   HIPAA/medical authority confirmed? Yes   Relationship to patient of person spoken to? Self   Refill Screening Questions    Changes to allergies? No   Changes to medications? No   New conditions since last clinic visit? No   Unplanned office visit, urgent care, ED, or hospital admission in the last 4 weeks? No   How does patient/caregiver feel medication is working? Excellent   Financial problems or insurance changes? No   How many doses of your specialty medications were missed in the last 4 weeks? 0   Would patient like to speak to a pharmacist? No   When does the patient need to receive the medication? 01/12/22   Refill Delivery Questions    How will the patient receive the medication? Pickup   When does the patient need to receive the medication? 01/12/22   Address in [facility] confirmed and updated if neccessary? Yes   Expected Copay ($) 203.57   Is the patient able to afford the medication copay? Yes   Payment Method at pickup   Supplies needed? No supplies needed   Refill activity completed? Yes   Refill activity plan Refill scheduled   Shipment/Pickup Date: 01/12/22          Current Outpatient Medications   Medication Sig    chorionic gonadotropin, human (PREGNYL) 10,000 unit injection Mix powder with 1 mL of saline and inject under the skin as directed    letrozole (FEMARA) 2.5 mg Tab Take 1 tablet by mouth daily for 15 days    progesterone micronized (ENDOMETRIN) 100 mg  vaginal insert Place 1 tablet (100 mg total) vaginally 2 (two) times a day.   Last reviewed on 12/28/2021 12:23 PM by Jabier Childs Jr., MD    Review of patient's allergies indicates:   Allergen Reactions    Codeine Anaphylaxis    Demerol [meperidine] Anaphylaxis    Morphine Anaphylaxis    Last reviewed on  12/28/2021 12:23 PM by Jabier Childs      Tasks added this encounter   2/4/2022 - Refill Call (Auto Added)   Tasks due within next 3 months   No tasks due.     Mary Beth  [facility]  [address] [facility] [address] [address]  Phone: [phone]  Fax: [phone]

## 2022-02-05 ENCOUNTER — LAB VISIT (OUTPATIENT)
Dept: PRIMARY CARE CLINIC | Facility: OTHER | Age: 37
End: 2022-02-05

## 2022-02-05 DIAGNOSIS — R50.9 FEVER, UNSPECIFIED FEVER CAUSE: ICD-10-CM

## 2022-02-05 DIAGNOSIS — R50.9 FEVER, UNSPECIFIED FEVER CAUSE: Primary | ICD-10-CM

## 2022-02-05 LAB
CTP QC/QA: YES
SARS-COV-2 AG RESP QL IA.RAPID: NEGATIVE

## 2022-02-05 PROCEDURE — 87811 SARS-COV-2 COVID19 W/OPTIC: CPT

## 2022-02-07 ENCOUNTER — OFFICE VISIT (OUTPATIENT)
Dept: FAMILY MEDICINE | Facility: CLINIC | Age: 37
End: 2022-02-07
Payer: COMMERCIAL

## 2022-02-07 DIAGNOSIS — U07.1 COVID: Primary | ICD-10-CM

## 2022-02-07 LAB
CTP QC/QA: YES
SARS-COV-2 RDRP RESP QL NAA+PROBE: POSITIVE

## 2022-02-07 PROCEDURE — 99214 PR OFFICE/OUTPT VISIT, EST, LEVL IV, 30-39 MIN: ICD-10-PCS | Mod: S$GLB,,, | Performed by: FAMILY MEDICINE

## 2022-02-07 PROCEDURE — 99214 OFFICE O/P EST MOD 30 MIN: CPT | Mod: S$GLB,,, | Performed by: FAMILY MEDICINE

## 2022-02-07 PROCEDURE — 1159F MED LIST DOCD IN RCRD: CPT | Mod: CPTII,S$GLB,, | Performed by: FAMILY MEDICINE

## 2022-02-07 PROCEDURE — 99999 PR PBB SHADOW E&M-EST. PATIENT-LVL III: CPT | Mod: PBBFAC,,, | Performed by: FAMILY MEDICINE

## 2022-02-07 PROCEDURE — U0002 COVID-19 LAB TEST NON-CDC: HCPCS | Mod: QW,S$GLB,, | Performed by: FAMILY MEDICINE

## 2022-02-07 PROCEDURE — 99999 PR PBB SHADOW E&M-EST. PATIENT-LVL III: ICD-10-PCS | Mod: PBBFAC,,, | Performed by: FAMILY MEDICINE

## 2022-02-07 PROCEDURE — 1159F PR MEDICATION LIST DOCUMENTED IN MEDICAL RECORD: ICD-10-PCS | Mod: CPTII,S$GLB,, | Performed by: FAMILY MEDICINE

## 2022-02-07 PROCEDURE — U0002: ICD-10-PCS | Mod: QW,S$GLB,, | Performed by: FAMILY MEDICINE

## 2022-02-07 RX ORDER — AZITHROMYCIN 250 MG/1
TABLET, FILM COATED ORAL
Qty: 6 TABLET | Refills: 0 | Status: SHIPPED | OUTPATIENT
Start: 2022-02-07 | End: 2022-02-12

## 2022-02-07 RX ORDER — PREDNISONE 20 MG/1
20 TABLET ORAL DAILY
Qty: 5 TABLET | Refills: 0 | Status: SHIPPED | OUTPATIENT
Start: 2022-02-07 | End: 2022-02-12

## 2022-02-07 RX ORDER — BENZONATATE 100 MG/1
100 CAPSULE ORAL 3 TIMES DAILY PRN
Qty: 30 CAPSULE | Refills: 0 | Status: SHIPPED | OUTPATIENT
Start: 2022-02-07 | End: 2022-02-17

## 2022-02-07 RX ORDER — ALBUTEROL SULFATE 90 UG/1
2 AEROSOL, METERED RESPIRATORY (INHALATION) EVERY 6 HOURS PRN
Qty: 18 G | Refills: 0 | Status: SHIPPED | OUTPATIENT
Start: 2022-02-07 | End: 2022-09-16

## 2022-02-07 NOTE — PROGRESS NOTES
Answers for HPI/ROS submitted by the patient on 2/6/2022  Chronicity: new  Onset: in the past 7 days  Frequency: constantly  Progression since onset: waxing and waning  Max temp prior to arrival: 100 to 100.9 F  Temperature source: an oral thermometer  abdominal pain: No  chest pain: No  congestion: Yes  cough: Yes  diarrhea: No  ear pain: No  headaches: Yes  muscle aches: No  nausea: No  rash: No  sleepiness: No  sore throat: Yes  vomiting: No  wheezing: No  urinary pain: No  Treatments tried: acetaminophen, fluids  Improvement on treatment: mild    [facility] Note    Chief Complaint      Chief Complaint   Patient presents with    Nasal Congestion    Cough    Sore Throat    Fever    Headache     History of Present Illness      Christie Bay is a 36 y.o. female who presents today with:    Son tested pos for COVID last Wed.  Patient started with sx on Friday.  Fever (101), congestion, cough, HA, sore throat.  Slight productive cough.  Has been taking OTC sinus meds and tylenol.      LMP:  Just finished today.      Problem List Items Addressed This Visit    None     Visit Diagnoses     COVID    -  Primary    Relevant Medications    azithromycin (Z-SONIA) 250 MG tablet    predniSONE (DELTASONE) 20 MG tablet    albuterol (VENTOLIN HFA) 90 mcg/actuation inhaler    benzonatate (TESSALON) 100 MG capsule    Other Relevant Orders    POCT COVID-19 Rapid Screening (Completed)          Health Maintenance   Topic Date Due    TETANUS VACCINE  05/01/2030    Hepatitis C Screening  Completed    Lipid Panel  Completed       Past Medical History:   Diagnosis Date    History of shingles     Migraine headache     Ulcer        Past Surgical History:   Procedure Laterality Date    CHOLECYSTECTOMY      KNEE SURGERY Left     SHOULDER SURGERY Right        family history includes Brain cancer in her paternal grandfather; Breast cancer in her maternal grandmother; Cancer in her maternal  grandmother, mother, and paternal grandfather; Diabetes in her maternal grandmother; Hypertension in her father; Melanoma in her mother; Migraines in her mother; Skin cancer in her mother.     Social History     Tobacco Use    Smoking status: Never Smoker    Smokeless tobacco: Never Used   Substance Use Topics    Alcohol use: Not Currently     Comment: 2 per month.    Drug use: Yes       Review of Systems   Constitutional: Positive for fever. Negative for chills, malaise/fatigue and weight loss.   HENT: Positive for congestion and sore throat. Negative for ear discharge and ear pain.    Eyes: Negative for blurred vision.   Respiratory: Positive for cough. Negative for shortness of breath and wheezing.    Cardiovascular: Negative for chest pain and leg swelling.   Gastrointestinal: Negative for abdominal pain, constipation, diarrhea, nausea and vomiting.   Genitourinary: Negative for dysuria.   Musculoskeletal: Negative for myalgias.   Skin: Negative for rash.   Neurological: Positive for headaches. Negative for dizziness, tingling, focal weakness and weakness.   Endo/Heme/Allergies: Does not bruise/bleed easily.   Psychiatric/Behavioral: Negative for depression and suicidal ideas.        Outpatient Encounter Medications as of 2/7/2022   Medication Sig Dispense Refill    albuterol (VENTOLIN HFA) 90 mcg/actuation inhaler Inhale 2 puffs into the lungs every 6 (six) hours as needed for Wheezing. Rescue 18 g 0    azithromycin (Z-SONIA) 250 MG tablet Take 2 tablets by mouth on day 1; Take 1 tablet by mouth on days 2-5 6 tablet 0    benzonatate (TESSALON) 100 MG capsule Take 1 capsule (100 mg total) by mouth 3 (three) times daily as needed for Cough. 30 capsule 0    predniSONE (DELTASONE) 20 MG tablet Take 1 tablet (20 mg total) by mouth once daily. for 5 days 5 tablet 0    [DISCONTINUED] chorionic gonadotropin, human (PREGNYL) 10,000 unit injection Mix powder with 1 mL of saline and inject under the skin as  directed 1 each 0    [DISCONTINUED] letrozole (FEMARA) 2.5 mg Tab Take 1 tablet by mouth daily for 15 days 15 tablet 0    [DISCONTINUED] progesterone micronized (ENDOMETRIN) 100 mg vaginal insert Place 1 tablet (100 mg total) vaginally 2 (two) times a day. 21 tablet 1     No facility-administered encounter medications on file as of 2/7/2022.       Review of patient's allergies indicates:   Allergen Reactions    Codeine Anaphylaxis    Demerol [meperidine] Anaphylaxis    Morphine Anaphylaxis       Physical Exam       ]    Physical Exam  Constitutional:       General: She is not in acute distress.     Appearance: Normal appearance. She is normal weight. She is not ill-appearing.   HENT:      Head: Normocephalic.      Ears:      Comments: TM Dull Bilaterally     Nose: Congestion and rhinorrhea present.      Mouth/Throat:      Mouth: Mucous membranes are moist.      Pharynx: Oropharynx is clear. Posterior oropharyngeal erythema present.   Eyes:      Extraocular Movements: Extraocular movements intact.      Conjunctiva/sclera: Conjunctivae normal.      Pupils: Pupils are equal, round, and reactive to light.   Cardiovascular:      Rate and Rhythm: Normal rate and regular rhythm.      Pulses: Normal pulses.      Heart sounds: Normal heart sounds.   Pulmonary:      Effort: Pulmonary effort is normal.      Breath sounds: Normal breath sounds.   Musculoskeletal:         General: Normal range of motion.      Cervical back: Normal range of motion and neck supple.   Skin:     General: Skin is warm.      Findings: No rash.   Neurological:      General: No focal deficit present.      Mental Status: She is alert.   Psychiatric:         Mood and Affect: Mood normal.          Laboratory:  CBC:  No results for input(s): WBC, RBC, HGB, HCT, PLT, MCV, MCH, MCHC in the last 2160 hours.  CMP:  No results for input(s): GLU, CALCIUM, ALBUMIN, PROT, NA, K, CO2, CL, BUN, ALKPHOS, ALT, AST, BILITOT in the last 2160 hours.    Invalid  input(s): CREATININ  URINALYSIS:  No results for input(s): COLORU, CLARITYU, SPECGRAV, PHUR, PROTEINUA, GLUCOSEU, BILIRUBINCON, BLOODU, WBCU, RBCU, BACTERIA, MUCUS, NITRITE, LEUKOCYTESUR, UROBILINOGEN, HYALINECASTS in the last 2160 hours.   LIPIDS:  No results for input(s): TSH, HDL, CHOL, TRIG, LDLCALC, CHOLHDL, NONHDLCHOL, TOTALCHOLEST in the last 2160 hours.  TSH:  No results for input(s): TSH in the last 2160 hours.  A1C:  No results for input(s): HGBA1C in the last 2160 hours.    Radiology:      Assessment/Plan     Christie Bay is a 36 y.o.female with:    1. COVID  - POCT COVID-19 Rapid Screening  - azithromycin (Z-SONIA) 250 MG tablet; Take 2 tablets by mouth on day 1; Take 1 tablet by mouth on days 2-5  Dispense: 6 tablet; Refill: 0  - predniSONE (DELTASONE) 20 MG tablet; Take 1 tablet (20 mg total) by mouth once daily. for 5 days  Dispense: 5 tablet; Refill: 0  - albuterol (VENTOLIN HFA) 90 mcg/actuation inhaler; Inhale 2 puffs into the lungs every 6 (six) hours as needed for Wheezing. Rescue  Dispense: 18 g; Refill: 0  - benzonatate (TESSALON) 100 MG capsule; Take 1 capsule (100 mg total) by mouth 3 (three) times daily as needed for Cough.  Dispense: 30 capsule; Refill: 0    If symptoms worsen or do not improve return to clinic, go to Urgent Care or ER  Take Medications as directed   Hydrate  Take Mucinex 1200mg twice a day      -Continue current medications and maintain follow up with specialists.         Jabier Childs Jr, MD  [facility]

## 2022-02-15 ENCOUNTER — PATIENT MESSAGE (OUTPATIENT)
Dept: DERMATOLOGY | Facility: CLINIC | Age: 37
End: 2022-02-15
Payer: COMMERCIAL

## 2022-02-15 ENCOUNTER — OFFICE VISIT (OUTPATIENT)
Dept: DERMATOLOGY | Facility: CLINIC | Age: 37
End: 2022-02-15
Payer: COMMERCIAL

## 2022-02-15 ENCOUNTER — PATIENT MESSAGE (OUTPATIENT)
Dept: DERMATOLOGY | Facility: CLINIC | Age: 37
End: 2022-02-15

## 2022-02-15 VITALS — WEIGHT: 156 LBS | BODY MASS INDEX: 25.18 KG/M2

## 2022-02-15 DIAGNOSIS — D18.01 CHERRY ANGIOMA: ICD-10-CM

## 2022-02-15 DIAGNOSIS — Z12.83 SKIN EXAM, SCREENING FOR CANCER: ICD-10-CM

## 2022-02-15 DIAGNOSIS — L21.9 SEBORRHEIC DERMATITIS: Primary | ICD-10-CM

## 2022-02-15 DIAGNOSIS — L81.4 LENTIGINES: ICD-10-CM

## 2022-02-15 DIAGNOSIS — D22.9 NEVUS OF MULTIPLE SITES: ICD-10-CM

## 2022-02-15 PROCEDURE — 99203 PR OFFICE/OUTPT VISIT, NEW, LEVL III, 30-44 MIN: ICD-10-PCS | Mod: S$GLB,,, | Performed by: DERMATOLOGY

## 2022-02-15 PROCEDURE — 1159F MED LIST DOCD IN RCRD: CPT | Mod: CPTII,S$GLB,, | Performed by: DERMATOLOGY

## 2022-02-15 PROCEDURE — 99203 OFFICE O/P NEW LOW 30 MIN: CPT | Mod: S$GLB,,, | Performed by: DERMATOLOGY

## 2022-02-15 PROCEDURE — 1160F RVW MEDS BY RX/DR IN RCRD: CPT | Mod: CPTII,S$GLB,, | Performed by: DERMATOLOGY

## 2022-02-15 PROCEDURE — 3008F BODY MASS INDEX DOCD: CPT | Mod: CPTII,S$GLB,, | Performed by: DERMATOLOGY

## 2022-02-15 PROCEDURE — 1159F PR MEDICATION LIST DOCUMENTED IN MEDICAL RECORD: ICD-10-PCS | Mod: CPTII,S$GLB,, | Performed by: DERMATOLOGY

## 2022-02-15 PROCEDURE — 3008F PR BODY MASS INDEX (BMI) DOCUMENTED: ICD-10-PCS | Mod: CPTII,S$GLB,, | Performed by: DERMATOLOGY

## 2022-02-15 PROCEDURE — 99999 PR PBB SHADOW E&M-EST. PATIENT-LVL III: CPT | Mod: PBBFAC,,, | Performed by: DERMATOLOGY

## 2022-02-15 PROCEDURE — 99999 PR PBB SHADOW E&M-EST. PATIENT-LVL III: ICD-10-PCS | Mod: PBBFAC,,, | Performed by: DERMATOLOGY

## 2022-02-15 PROCEDURE — 1160F PR REVIEW ALL MEDS BY PRESCRIBER/CLIN PHARMACIST DOCUMENTED: ICD-10-PCS | Mod: CPTII,S$GLB,, | Performed by: DERMATOLOGY

## 2022-02-15 RX ORDER — ALCLOMETASONE DIPROPIONATE 0.5 MG/G
CREAM TOPICAL
Qty: 60 G | Refills: 3 | Status: SHIPPED | OUTPATIENT
Start: 2022-02-15 | End: 2022-09-16

## 2022-02-15 NOTE — PROGRESS NOTES
Subjective:       Patient ID:  Christie Bay is a 36 y.o. female who presents for   Chief Complaint   Patient presents with    Skin Check     TBSE, dryness between eyes      Would like skin check, has rash off and on around eyebrows.       Review of Systems   Constitutional: Negative for fever, chills, weight loss, weight gain, fatigue, night sweats and malaise.   Skin: Positive for dry skin, daily sunscreen use, activity-related sunscreen use and wears hat.   Hematologic/Lymphatic: Does not bruise/bleed easily.        Objective:    Physical Exam   Constitutional: She appears well-developed and well-nourished. No distress.   Neurological: She is alert and oriented to person, place, and time. She is not disoriented.   Psychiatric: She has a normal mood and affect.   Skin:   Areas Examined (abnormalities noted in diagram):   Head / Face Inspection Performed  Neck Inspection Performed  Chest / Axilla Inspection Performed  Abdomen Inspection Performed  Back Inspection Performed  RUE Inspected  LUE Inspection Performed  RLE Inspected  LLE Inspection Performed  Nails and Digits Inspection Performed                   Diagram Legend     Erythematous scaling macule/papule c/w actinic keratosis       Vascular papule c/w angioma      Pigmented verrucoid papule/plaque c/w seborrheic keratosis      Yellow umbilicated papule c/w sebaceous hyperplasia      Irregularly shaped tan macule c/w lentigo     1-2 mm smooth white papules consistent with Milia      Movable subcutaneous cyst with punctum c/w epidermal inclusion cyst      Subcutaneous movable cyst c/w pilar cyst      Firm pink to brown papule c/w dermatofibroma      Pedunculated fleshy papule(s) c/w skin tag(s)      Evenly pigmented macule c/w junctional nevus     Mildly variegated pigmented, slightly irregular-bordered macule c/w mildly atypical nevus      Flesh colored to evenly pigmented papule c/w intradermal nevus       Pink pearly papule/plaque c/w  "basal cell carcinoma      Erythematous hyperkeratotic cursted plaque c/w SCC      Surgical scar with no sign of skin cancer recurrence      Open and closed comedones      Inflammatory papules and pustules      Verrucoid papule consistent consistent with wart     Erythematous eczematous patches and plaques     Dystrophic onycholytic nail with subungual debris c/w onychomycosis     Umbilicated papule    Erythematous-base heme-crusted tan verrucoid plaque consistent with inflamed seborrheic keratosis     Erythematous Silvery Scaling Plaque c/w Psoriasis     See annotation      Assessment / Plan:        Seborrheic dermatitis  -     alclometasone (ACLOVATE) 0.05 % cream; AAA face bid prn redness or scaling or itching  Dispense: 60 g; Refill: 3   Z bar soap    Nevus of multiple sites  The "ABCD" rules to observe pigmented lesions were reviewed.      Lentigines  The "ABCD" rules to observe pigmented lesions were reviewed.  sunscreen    Skin exam, screening for cancer  . No lesions suspicious for malignancy noted.    Recommend daily sun protection/avoidance and use of at least SPF 30, broad spectrum sunscreen (OTC drug).       Cherry angioma  reassurance               Follow up in about 1 year (around 2/15/2023).  "

## 2022-03-03 ENCOUNTER — SPECIALTY PHARMACY (OUTPATIENT)
Dept: PHARMACY | Facility: CLINIC | Age: 37
End: 2022-03-03
Payer: COMMERCIAL

## 2022-03-03 NOTE — TELEPHONE ENCOUNTER
Specialty Pharmacy - Refill Coordination    Specialty Medication Orders Linked to Encounter    Flowsheet Row Most Recent Value   Medication #1 progesterone micronized (ENDOMETRIN) 100 mg vaginal insert (Order#[id number], Rx#[id number])   Medication #2 chorionic gonadotropin, human (PREGNYL) 10,000 unit injection (Order#[id number], Rx#[id number])          Refill Questions - Documented Responses    Flowsheet Row Most Recent Value   Patient Availability and HIPAA Verification    Does patient want to proceed with activity? Yes   HIPAA/medical authority confirmed? Yes   Relationship to patient of person spoken to? Self   Refill Screening Questions    Changes to allergies? No   Changes to medications? No   New conditions since last clinic visit? No   Unplanned office visit, urgent care, ED, or hospital admission in the last 4 weeks? No   How does patient/caregiver feel medication is working? Excellent   Financial problems or insurance changes? No   How many doses of your specialty medications were missed in the last 4 weeks? 0   Would patient like to speak to a pharmacist? No   When does the patient need to receive the medication? 03/04/22   Refill Delivery Questions    How will the patient receive the medication? Pickup   When does the patient need to receive the medication? 03/04/22   Expected Copay ($) 266.81   Is the patient able to afford the medication copay? Yes   Payment Method zero copay   Days supply of Refill 15   Supplies needed? No supplies needed   Refill activity completed? Yes   Refill activity plan Refill scheduled   Shipment/Pickup Date: 03/04/22          Current Outpatient Medications   Medication Sig    albuterol (VENTOLIN HFA) 90 mcg/actuation inhaler Inhale 2 puffs into the lungs every 6 (six) hours as needed for Wheezing. Rescue (Patient not taking: Reported on 2/15/2022)    alclometasone (ACLOVATE) 0.05 % cream Apply to affected area of face twice daily as needed for redness or scaling or itching     chorionic gonadotropin, human (PREGNYL) 10,000 unit injection Mix powder with 1 mL of saline and inject subQ    letrozole (FEMARA) 2.5 mg Tab take 2 tablets by mouth daily for 5 days    progesterone micronized (ENDOMETRIN) 100 mg vaginal insert insert 1 capsule per vagina BID   Last reviewed on 2/15/2022  1:11 PM by Christie Parr MD    Review of patient's allergies indicates:   Allergen Reactions    Codeine Anaphylaxis    Demerol [meperidine] Anaphylaxis    Morphine Anaphylaxis    Last reviewed on  2/15/2022 1:11 PM by Christie Parr      Tasks added this encounter   No tasks added.   Tasks due within next 3 months   No tasks due.     Peng Recio, PharmD  [facility]  [address] [facility] [address] [address]  Phone: [phone]  Fax: [phone]

## 2022-04-04 ENCOUNTER — SPECIALTY PHARMACY (OUTPATIENT)
Dept: PHARMACY | Facility: CLINIC | Age: 37
End: 2022-04-04
Payer: COMMERCIAL

## 2022-04-04 NOTE — TELEPHONE ENCOUNTER
Specialty Pharmacy - Refill Coordination    Specialty Medication Orders Linked to Encounter    Flowsheet Row Most Recent Value   Medication #1 progesterone micronized (ENDOMETRIN) 100 mg vaginal insert (Order#[id number], Rx#[id number])   Medication #2 chorionic gonadotropin, human (PREGNYL) 10,000 unit injection (Order#[id number], Rx#[id number])        Refill Questions - Documented Responses    Flowsheet Row Most Recent Value   Patient Availability and HIPAA Verification    Does patient want to proceed with activity? Yes   HIPAA/medical authority confirmed? Yes   Relationship to patient of person spoken to? Self   Refill Screening Questions    Changes to allergies? No   Changes to medications? No   New conditions since last clinic visit? No   Unplanned office visit, urgent care, ED, or hospital admission in the last 4 weeks? No   Financial problems or insurance changes? No   How many doses of your specialty medications were missed in the last 4 weeks? 0   Would patient like to speak to a pharmacist? No   When does the patient need to receive the medication? 04/04/22   Refill Delivery Questions    How will the patient receive the medication? Pickup   When does the patient need to receive the medication? 04/04/22   Expected Copay ($) 266.81   Is the patient able to afford the medication copay? Yes   Payment Method at pickup   Days supply of Refill 15   Supplies needed? Syringes  [Subq syringes]   Refill activity completed? No   Refill activity plan Refill scheduled   Shipment/Pickup Date: 04/04/22          Current Outpatient Medications   Medication Sig    albuterol (VENTOLIN HFA) 90 mcg/actuation inhaler Inhale 2 puffs into the lungs every 6 (six) hours as needed for Wheezing. Rescue (Patient not taking: Reported on 2/15/2022)    alclometasone (ACLOVATE) 0.05 % cream Apply to affected area of face twice daily as needed for redness or scaling or itching    chorionic gonadotropin, human (PREGNYL) 10,000 unit  injection Mix powder with 1 mL of saline and inject into the skin as directed    letrozole (FEMARA) 2.5 mg Tab Take 1 tablet (2.5 mg total) by mouth once daily for 15 days.    progesterone micronized (ENDOMETRIN) 100 mg vaginal insert insert 1 capsule per vagina twice daily   Last reviewed on 2/15/2022  1:11 PM by Christie Parr MD    Review of patient's allergies indicates:   Allergen Reactions    Codeine Anaphylaxis    Demerol [meperidine] Anaphylaxis    Morphine Anaphylaxis    Last reviewed on  2/15/2022 1:11 PM by Christie Parr      Tasks added this encounter   No tasks added.   Tasks due within next 3 months   No tasks due.     Domo Aviles, PharmD  [facility]  [address] [facility] [address]  Phone: [phone]  Fax: [phone]

## 2022-04-18 ENCOUNTER — PATIENT MESSAGE (OUTPATIENT)
Dept: ADMINISTRATIVE | Facility: OTHER | Age: 37
End: 2022-04-18
Payer: COMMERCIAL

## 2022-04-28 ENCOUNTER — SPECIALTY PHARMACY (OUTPATIENT)
Dept: PHARMACY | Facility: CLINIC | Age: 37
End: 2022-04-28
Payer: COMMERCIAL

## 2022-04-28 NOTE — TELEPHONE ENCOUNTER
Specialty Pharmacy - Refill Coordination    Specialty Medication Orders Linked to Encounter    Flowsheet Row Most Recent Value   Medication #1 chorionic gonadotropin, human (PREGNYL) 10,000 unit injection (Order#[id number], Rx#[id number])      Pt said she only needs to  pregnyl    Refill Questions - Documented Responses    Flowsheet Row Most Recent Value   Patient Availability and HIPAA Verification    Does patient want to proceed with activity? Yes   HIPAA/medical authority confirmed? Yes   Relationship to patient of person spoken to? Self   Refill Screening Questions    Changes to allergies? No   Changes to medications? No   New conditions since last clinic visit? No   Unplanned office visit, urgent care, ED, or hospital admission in the last 4 weeks? No   Financial problems or insurance changes? No   Would patient like to speak to a pharmacist? No   When does the patient need to receive the medication? 04/28/22   Refill Delivery Questions    How will the patient receive the medication? Pickup   When does the patient need to receive the medication? 04/28/22   Expected Copay ($) 55   Is the patient able to afford the medication copay? Yes   Payment Method at pickup   Days supply of Refill 1   Supplies needed? Syringes   Refill activity completed? Yes   Refill activity plan Refill scheduled   Shipment/Pickup Date: 04/28/22          Current Outpatient Medications   Medication Sig    albuterol (VENTOLIN HFA) 90 mcg/actuation inhaler Inhale 2 puffs into the lungs every 6 (six) hours as needed for Wheezing. Rescue (Patient not taking: Reported on 2/15/2022)    alclometasone (ACLOVATE) 0.05 % cream Apply to affected area of face twice daily as needed for redness or scaling or itching    chorionic gonadotropin, human (PREGNYL) 10,000 unit injection Mix powder with 1 mL of saline and inject into the skin as directed    letrozole (FEMARA) 2.5 mg Tab Take 1 tablet (2.5 mg total) by mouth once daily for 15 days.     progesterone micronized (ENDOMETRIN) 100 mg vaginal insert insert 1 capsule per vagina twice daily    progesterone micronized (ENDOMETRIN) 100 mg vaginal insert insert 1 capsule per vagina BID   Last reviewed on 2/15/2022  1:11 PM by Christie Parr MD    Review of patient's allergies indicates:   Allergen Reactions    Codeine Anaphylaxis    Demerol [meperidine] Anaphylaxis    Morphine Anaphylaxis    Last reviewed on  2/15/2022 1:11 PM by Christie Parr      Tasks added this encounter   No tasks added.   Tasks due within next 3 months   No tasks due.     Corie Rocha, PharmD  [facility]  [address] [facility] [address]  Phone: [phone]  Fax: [phone]

## 2022-09-16 ENCOUNTER — OFFICE VISIT (OUTPATIENT)
Dept: NEUROLOGY | Facility: CLINIC | Age: 37
End: 2022-09-16
Payer: COMMERCIAL

## 2022-09-16 VITALS
SYSTOLIC BLOOD PRESSURE: 106 MMHG | BODY MASS INDEX: 26.86 KG/M2 | HEIGHT: 66 IN | WEIGHT: 167.13 LBS | DIASTOLIC BLOOD PRESSURE: 73 MMHG | HEART RATE: 78 BPM

## 2022-09-16 DIAGNOSIS — G43.019 INTRACTABLE MIGRAINE WITHOUT AURA AND WITHOUT STATUS MIGRAINOSUS: Primary | ICD-10-CM

## 2022-09-16 PROBLEM — R19.7 DIARRHEA: Status: RESOLVED | Noted: 2021-04-06 | Resolved: 2022-09-16

## 2022-09-16 PROCEDURE — 1160F PR REVIEW ALL MEDS BY PRESCRIBER/CLIN PHARMACIST DOCUMENTED: ICD-10-PCS | Mod: CPTII,S$GLB,, | Performed by: PSYCHIATRY & NEUROLOGY

## 2022-09-16 PROCEDURE — 3008F PR BODY MASS INDEX (BMI) DOCUMENTED: ICD-10-PCS | Mod: CPTII,S$GLB,, | Performed by: PSYCHIATRY & NEUROLOGY

## 2022-09-16 PROCEDURE — 1159F MED LIST DOCD IN RCRD: CPT | Mod: CPTII,S$GLB,, | Performed by: PSYCHIATRY & NEUROLOGY

## 2022-09-16 PROCEDURE — 99999 PR PBB SHADOW E&M-EST. PATIENT-LVL III: CPT | Mod: PBBFAC,,, | Performed by: PSYCHIATRY & NEUROLOGY

## 2022-09-16 PROCEDURE — 99999 PR PBB SHADOW E&M-EST. PATIENT-LVL III: ICD-10-PCS | Mod: PBBFAC,,, | Performed by: PSYCHIATRY & NEUROLOGY

## 2022-09-16 PROCEDURE — 1160F RVW MEDS BY RX/DR IN RCRD: CPT | Mod: CPTII,S$GLB,, | Performed by: PSYCHIATRY & NEUROLOGY

## 2022-09-16 PROCEDURE — 3074F SYST BP LT 130 MM HG: CPT | Mod: CPTII,S$GLB,, | Performed by: PSYCHIATRY & NEUROLOGY

## 2022-09-16 PROCEDURE — 3074F PR MOST RECENT SYSTOLIC BLOOD PRESSURE < 130 MM HG: ICD-10-PCS | Mod: CPTII,S$GLB,, | Performed by: PSYCHIATRY & NEUROLOGY

## 2022-09-16 PROCEDURE — 1159F PR MEDICATION LIST DOCUMENTED IN MEDICAL RECORD: ICD-10-PCS | Mod: CPTII,S$GLB,, | Performed by: PSYCHIATRY & NEUROLOGY

## 2022-09-16 PROCEDURE — 3008F BODY MASS INDEX DOCD: CPT | Mod: CPTII,S$GLB,, | Performed by: PSYCHIATRY & NEUROLOGY

## 2022-09-16 PROCEDURE — 99204 PR OFFICE/OUTPT VISIT, NEW, LEVL IV, 45-59 MIN: ICD-10-PCS | Mod: S$GLB,,, | Performed by: PSYCHIATRY & NEUROLOGY

## 2022-09-16 PROCEDURE — 3078F PR MOST RECENT DIASTOLIC BLOOD PRESSURE < 80 MM HG: ICD-10-PCS | Mod: CPTII,S$GLB,, | Performed by: PSYCHIATRY & NEUROLOGY

## 2022-09-16 PROCEDURE — 99204 OFFICE O/P NEW MOD 45 MIN: CPT | Mod: S$GLB,,, | Performed by: PSYCHIATRY & NEUROLOGY

## 2022-09-16 PROCEDURE — 3078F DIAST BP <80 MM HG: CPT | Mod: CPTII,S$GLB,, | Performed by: PSYCHIATRY & NEUROLOGY

## 2022-09-16 RX ORDER — METHYLPREDNISOLONE 4 MG/1
TABLET ORAL
Qty: 21 TABLET | Refills: 0 | Status: SHIPPED | OUTPATIENT
Start: 2022-09-16 | End: 2023-01-31

## 2022-09-16 RX ORDER — SUMATRIPTAN SUCCINATE 100 MG/1
100 TABLET ORAL
Qty: 9 TABLET | Refills: 1 | Status: SHIPPED | OUTPATIENT
Start: 2022-09-16 | End: 2023-10-15 | Stop reason: SDUPTHER

## 2022-09-16 NOTE — PROGRESS NOTES
Chief Complaint: Headache    Subjective:     History of Present Illness    Referring Provider: Self Referral  Accompanied by: No pain    09/16/2022: Christie Bay is a 37 y.o. female with h/o Covid who presents for headache evaluation. She states that she will get a headache 2 days prior to her menstrual cycle but most recently headache was not related to her menstrual cycle and lasted about a week. Headaches started after her gall bladder surgery in 12/2008. Typically, headaches are once a month as above with menstrual cycle, last 2-3 days, right forehead to above right ear that wraps around to right face and behind right eye, pressure. Typically, she has associated photophobia, nausea. She denies associated weakness, numbness, tingling, phonophobia, vomiting, change in vision, aura. Sometimes typical headaches stop her ADLs. She states that BC powder is the only thing seems to take the edge off the first day and resolves the headache the 2nd day. Of note, she had a stomach ulcer in grad school. She has not tried prescription abortive therapy or Excedrin. She has tried Tylenol, ibuprofen. She has not done prophylactic headache medication. She is not currently pregnant.     The unusual headache started on 9/10/22 in the evening, denies an inciting event (injury, medication change), not currently near her menstrual cycle, left forehead to left side to left occipital to base of left skull to behind left eye and on day 3 or 4 of the headache is behind both eyes, constant with varying intensity, pressure, this morning is dull roar. She denies neck pain or recent neck injury. She has associated photophobia, nausea. She denies associated phonophobia, weakness, numbness, tingling, change in vision, dizziness, aura. She denies having had a headache like this before. She has been sleeping decently and wakes up rested. She denies snoring and apnea. She denies a positional component and vasal vagal maneuvers  do not significantly worsen headaches. Headache woke her up once and denies waking up with headaches. Mood is fine. She has tried BC powder, ibuprofen, Tylenol and none of it has helped.    Current Outpatient Medications on File Prior to Visit   Medication Sig Dispense Refill    [DISCONTINUED] alclometasone (ACLOVATE) 0.05 % cream Apply to affected area of face twice daily as needed for redness or scaling or itching 60 g 3    [DISCONTINUED] albuterol (VENTOLIN HFA) 90 mcg/actuation inhaler Inhale 2 puffs into the lungs every 6 (six) hours as needed for Wheezing. Rescue (Patient not taking: Reported on 2/15/2022) 18 g 0    [DISCONTINUED] chorionic gonadotropin, human (PREGNYL) 10,000 unit injection Mix powder with 1 mL of saline and inject into the skin as directed 1 each 0    [DISCONTINUED] letrozole (FEMARA) 2.5 mg Tab Take 1 tablet (2.5 mg total) by mouth once daily for 15 days. 15 tablet 0    [DISCONTINUED] progesterone micronized (ENDOMETRIN) 100 mg vaginal insert insert 1 capsule per vagina twice daily 60 each 1    [DISCONTINUED] progesterone micronized (ENDOMETRIN) 100 mg vaginal insert insert 1 capsule per vagina BID 21 tablet 1     No current facility-administered medications on file prior to visit.       Review of patient's allergies indicates:   Allergen Reactions    Codeine Anaphylaxis    Demerol [meperidine] Anaphylaxis    Morphine Anaphylaxis       Family History   Problem Relation Age of Onset    Migraines Mother     Skin cancer Mother     Cancer Mother         skin    Melanoma Mother     Hypertension Father     Diabetes Maternal Grandmother     Breast cancer Maternal Grandmother     Cancer Maternal Grandmother         breast    Brain cancer Paternal Grandfather     Cancer Paternal Grandfather         brain    Ovarian cancer Neg Hx     Colon cancer Neg Hx     Eclampsia Neg Hx     Miscarriages / Stillbirths Neg Hx      labor Neg Hx     Stroke Neg Hx        Social History     Tobacco Use     Smoking status: Never    Smokeless tobacco: Never   Substance Use Topics    Alcohol use: Not Currently     Comment: 2 per month.    Drug use: Never       Review of Systems  Constitutional: No fevers, no chills, no change in weight  Eye/Vision: See HPI  Ear/Nose/Mouth/Throat: See HPI; no cough, no runny nose, no sore throat  Respiratory: No shortness of breath, no problems breathing  Cardiovascular: No chest pain  Gastrointestinal: See HPI, no diarrhea, no constipation  Genitourinary: No dysuria  Musculoskeletal: See HPI  Integumentary: No skin changes  Neurologic: See HPI  Psychiatric: Denies depression, denies anxiety, denies SI and HI.    Objective:     Vitals:    09/16/22 1052   BP: 106/73   Pulse: 78     General: Alert and awake, Well nourished, Well groomed, No acute distress, no photophobia with 60 Hz hypersensitivity.  Eyes: Pupils are equal, round and reactive to light; Extraocular movements are intact; Normal conjunctiva; no nystagmus; Visual fields are intact bilaterally in all cardinal directions; Funduscopic exam attempted but unable to do due to necessary PPE for Covid19. Head thrust negative bilaterally. VOR cancellation WNL  HENT: Normocephalic, Rinne test positive bilaterally, Oral mucosa is moist, No pharyngeal erythema.  Neck: Supple  No Stiffness, Patient has no occipital point tenderness bilaterally without induction of headaches: 0+  No high, medial cervical pain with lateral movement of C1 over C2 and with isometric neck flexion and extension  Fluid patient turnaround with concurrent neck movement in direction of torso movement.  Cardiovascular: Normal rate, Regular rhythm, No murmur, Good pulses equal in all extremities, Normal peripheral perfusion, No edema; no carotid bruits noted.  Musculoskeletal: No swelling.  Spine/torso exam: Spine/ torso exam is within normal limits except for cervical range of motion is diminished with active restriction due to pain.  Integumentary: Warm, Dry, Intact,  "No pallor, No rash.    Neurologic Exam  Mental Status: orientated to time, person, and place; good recent and remote memory; attention and concentration WNL; naming intact; adequate fund of knowledge. No aphasia or dysarthria. Repetition intact. Follows complex commands    Cranial Nerves: as above, V1-V3 temperature sensation WNL bilaterally, face symmetric, symmetrical palatal rise, SCM 5/5 bilaterally, tongue protrusion midline and movements WNL  no saccadic intrusions of volitional ocular smooth pursuits  no pain with sustained upgaze and convergence  no visual motion sensitivity/dizziness produced with rapid eye movements or neck movements  non-lateralizing Malin tuning fork exam  convergence insufficiency with diplopia developed > 5 " accommodation    Muscle Tone/Motor Function: Normal bulk and tone throughout. No drift. Normal rapidly alternating movements. No tremors. No abnormal movements                                                                                                          Right                   Left                                  Deltoid          5/5                      5/5                                  Biceps          5/5                      5/5                                  Triceps         5/5                      5/5                                  Iliopsoas       5/5                     5/5                                  Quadriceps   5/5                     5/5                                  Hamstring     5/5                     5/5                                  Dorsiflexion   5/5                     5/5    Sensory: Vibration sensation WNL x4, Temperature sensation WNL x4, Negative Romberg, no falls on tandem stance    Reflexes: Symmetrical DTR's, Biceps 2+, Brachioradialis 2+, Patellar 2+, No Wartenberg or Lhermitte    Coordination: No truncal ataxia. Finger to nose WNL bilaterally    Gait: Gait WNL, Heel to toe walking WNL    Labs:    No visits with results within " 6 Month(s) from this visit.   Latest known visit with results is:   Office Visit on 02/07/2022   Component Date Value Ref Range Status    POC Rapid COVID 02/07/2022 Positive (A)  Negative Final     Acceptable 02/07/2022 Yes   Final      I personally reviewed all current labs noted in today's chart.    Imaging:    No recent neurological studies to review    Assessment:       ICD-10-CM ICD-9-CM    1. Intractable migraine without aura and without status migrainosus  G43.019 346.11          37 y.o. female with h/o Covid who presents for headache evaluation. On exam, she has convergence insufficiency. We discussed that baseline headaches and current headache meet criteria for migraine without aura. We discussed for current headache with some pain at left base of skull, there is the chance for neck involvement but she has no neck pain history or features of neck involvement on her exam. We discussed for current headache will try Medrol dose pack to break the cycle. Counseled the patient that steroids suppress the immune response, which means that they are at increased risk for juan pablo bacterial or viral infections including COVID19 and if they were to become infected, they may have a more severe disease course. We discussed that any form of steroids including oral or injectable should not be taken within 2 weeks of COVID19 vaccination/booster. The patient has elected to take steroids. The patient's last COVID19 vaccination/booster was more than 2 weeks ago. For baseline headaches, we discussed only needing abortive therapy at this time as headaches are not frequent enough for prophylactic therapy and they are triggered by her menstrual cycle. We discussed given that she does not notice convergence insufficiency with normal vision, do not need to further work it up at this time.    Plan:     Medrol dose pack prescribed. Take as instructed on package insert.  For your normal menstrual migraines, try  taking Excedrin at headache or menstrual cycle onset as per package instructions.  If Excedrin does not work for your menstrual migraines, then take sumatriptan 100 mg at headache onset or menstrual cycle onset. You may take 2nd tab 2 hours later but no more than 2 tabs in 24 hours  Message me in 1 week for an update on your current headache or contact me sooner if other symptoms arise or headache worsens    Jai Ferris MD  Sports Neurology

## 2022-09-16 NOTE — PATIENT INSTRUCTIONS
Medrol dose pack prescribed. Take as instructed on package insert.  For your normal menstrual migraines, try taking Excedrin at headache or menstrual cycle onset as per package instructions.  If Excedrin does not work for your menstrual migraines, then take sumatriptan 100 mg at headache onset or menstrual cycle onset. You may take 2nd tab 2 hours later but no more than 2 tabs in 24 hours  Message me in 1 week for an update on your current headache or contact me sooner if other symptoms arise or headache worsens

## 2022-09-19 ENCOUNTER — OFFICE VISIT (OUTPATIENT)
Dept: SPORTS MEDICINE | Facility: CLINIC | Age: 37
End: 2022-09-19
Payer: COMMERCIAL

## 2022-09-19 VITALS
WEIGHT: 168 LBS | HEIGHT: 66 IN | DIASTOLIC BLOOD PRESSURE: 75 MMHG | HEART RATE: 64 BPM | BODY MASS INDEX: 27 KG/M2 | SYSTOLIC BLOOD PRESSURE: 111 MMHG

## 2022-09-19 DIAGNOSIS — R07.89 INTERMITTENT LEFT-SIDED CHEST PAIN: Primary | ICD-10-CM

## 2022-09-19 PROCEDURE — 3074F PR MOST RECENT SYSTOLIC BLOOD PRESSURE < 130 MM HG: ICD-10-PCS | Mod: CPTII,S$GLB,, | Performed by: ORTHOPAEDIC SURGERY

## 2022-09-19 PROCEDURE — 99214 PR OFFICE/OUTPT VISIT, EST, LEVL IV, 30-39 MIN: ICD-10-PCS | Mod: S$GLB,,, | Performed by: ORTHOPAEDIC SURGERY

## 2022-09-19 PROCEDURE — 1159F PR MEDICATION LIST DOCUMENTED IN MEDICAL RECORD: ICD-10-PCS | Mod: CPTII,S$GLB,, | Performed by: ORTHOPAEDIC SURGERY

## 2022-09-19 PROCEDURE — 1160F PR REVIEW ALL MEDS BY PRESCRIBER/CLIN PHARMACIST DOCUMENTED: ICD-10-PCS | Mod: CPTII,S$GLB,, | Performed by: ORTHOPAEDIC SURGERY

## 2022-09-19 PROCEDURE — 99214 OFFICE O/P EST MOD 30 MIN: CPT | Mod: S$GLB,,, | Performed by: ORTHOPAEDIC SURGERY

## 2022-09-19 PROCEDURE — 3008F PR BODY MASS INDEX (BMI) DOCUMENTED: ICD-10-PCS | Mod: CPTII,S$GLB,, | Performed by: ORTHOPAEDIC SURGERY

## 2022-09-19 PROCEDURE — 99999 PR PBB SHADOW E&M-EST. PATIENT-LVL III: ICD-10-PCS | Mod: PBBFAC,,, | Performed by: ORTHOPAEDIC SURGERY

## 2022-09-19 PROCEDURE — 3008F BODY MASS INDEX DOCD: CPT | Mod: CPTII,S$GLB,, | Performed by: ORTHOPAEDIC SURGERY

## 2022-09-19 PROCEDURE — 3074F SYST BP LT 130 MM HG: CPT | Mod: CPTII,S$GLB,, | Performed by: ORTHOPAEDIC SURGERY

## 2022-09-19 PROCEDURE — 99999 PR PBB SHADOW E&M-EST. PATIENT-LVL III: CPT | Mod: PBBFAC,,, | Performed by: ORTHOPAEDIC SURGERY

## 2022-09-19 PROCEDURE — 3078F PR MOST RECENT DIASTOLIC BLOOD PRESSURE < 80 MM HG: ICD-10-PCS | Mod: CPTII,S$GLB,, | Performed by: ORTHOPAEDIC SURGERY

## 2022-09-19 PROCEDURE — 1160F RVW MEDS BY RX/DR IN RCRD: CPT | Mod: CPTII,S$GLB,, | Performed by: ORTHOPAEDIC SURGERY

## 2022-09-19 PROCEDURE — 1159F MED LIST DOCD IN RCRD: CPT | Mod: CPTII,S$GLB,, | Performed by: ORTHOPAEDIC SURGERY

## 2022-09-19 PROCEDURE — 3078F DIAST BP <80 MM HG: CPT | Mod: CPTII,S$GLB,, | Performed by: ORTHOPAEDIC SURGERY

## 2022-09-19 NOTE — PROGRESS NOTES
CC: Chest tightness     37 y.o. Female presents today for initial evaluation of her chest tightness. She admits to a history of chest tightness that has occurred several times over the years and was diagnosed as stress and anxiety. She states her most recent episode occurred 09/15- and she describes the quality of her pain as pressure through the left side of her chest that hurts through to her back and occasionally across her entire chest. Her symptoms are increased at night, but will occur throughout the day while at rest. She also admits to feeling nauseated due to pain when the episodes occur. She denies shortness of breath during these events, but does feel as though she is unable to increase her activity when they occur as her symptoms will increase. She admits to a family history of asthma, and congestive heart failure in her grandmother. She denies known family history of early cardiac death.     PAST MEDICAL HISTORY:   Past Medical History:   Diagnosis Date    History of shingles     Migraine headache     Ulcer        PAST SURGICAL HISTORY:  Past Surgical History:   Procedure Laterality Date    CHOLECYSTECTOMY      KNEE SURGERY Left     SHOULDER SURGERY Right        FAMILY HISTORY:  Family History   Problem Relation Age of Onset    Migraines Mother     Skin cancer Mother     Cancer Mother         skin    Melanoma Mother     Hypertension Father     Diabetes Maternal Grandmother     Breast cancer Maternal Grandmother     Cancer Maternal Grandmother         breast    Brain cancer Paternal Grandfather     Cancer Paternal Grandfather         brain    Ovarian cancer Neg Hx     Colon cancer Neg Hx     Eclampsia Neg Hx     Miscarriages / Stillbirths Neg Hx      labor Neg Hx     Stroke Neg Hx        SOCIAL HISTORY:  Social History     Socioeconomic History    Marital status:     Number of children: 0   Occupational History    Occupation: ATC     Employer: [employer]   Tobacco Use  "   Smoking status: Never    Smokeless tobacco: Never   Substance and Sexual Activity    Alcohol use: Not Currently     Comment: 2 per month.    Drug use: Never    Sexual activity: Yes     Partners: Male       MEDICATIONS:     Current Outpatient Medications:     methylPREDNISolone (MEDROL DOSEPACK) 4 mg tablet, Take as directed on package, Disp: 21 tablet, Rfl: 0    sumatriptan (IMITREX) 100 MG tablet, Take 1 tablet (100 mg total) by mouth every 2 (two) hours as needed for Migraine (Do not take more than 200 mg in 24 hours)., Disp: 9 tablet, Rfl: 1    ALLERGIES:   Review of patient's allergies indicates:   Allergen Reactions    Codeine Anaphylaxis    Demerol [meperidine] Anaphylaxis    Morphine Anaphylaxis        PHYSICAL EXAMINATION:  /75   Pulse 64   Ht 5' 6" (1.676 m)   Wt 76.2 kg (168 lb)   BMI 27.12 kg/m²   Vitals signs and nursing note have been reviewed.  General: In no acute distress, well developed, well nourished, no diaphoresis  Eyes: EOM full and smooth, no eye redness or discharge  HENT: normocephalic and atraumatic, neck supple, trachea midline, no nasal discharge  Cardiovascular:  Regular rate and rhythm, no murmurs, S1 and S2 auscultated, no S3, no S4, no JVD, no LE edema  Lungs: CTABL, respirations non-labored, no conversational dyspnea   Neuro: AAOx3, CN2-12 grossly intact  Skin: No rashes, warm and dry  Psychiatric: cooperative, pleasant, mood and affect appropriate for age      ASSESSMENT:      ICD-10-CM ICD-9-CM   1. Intermittent left-sided chest pain  R07.89 786.59       PLAN:  Intermittent left-sided chest pain - new to provider    - Christie admits to left anterior chest tightness and pain that has occurred several times over the years and was diagnosed as stress and anxiety. She states her most recent episode occurred 09/15/2022-09/16/2022 and she describes the quality of her pain as pressure through the left side of her chest that hurts through to her back and occasionally across " her entire chest.    - We discussed possible causes, specifically cardiac, GERD, anxiety.  She states she has family members congestive heart failure and as such I recommend that we rule out cardiac causes.    - Echo and Stress test have been ordered.    - We also discussed a trial of Prilosec 20 mg daily for 30 days to treat GERD.  If symptoms improve with this medicine, it is more likely GI etiology than anything else.      Future planning includes - pending cardiac testing and results from medication     All questions were answered to the best of my ability and all concerns were addressed at this time.    Follow up in after testing for above.      This note is dictated using the M*Modal Fluency Direct word recognition program. There are word recognition mistakes that are occasionally missed on review.        Total time spent face-to face with patient counseling or coordinating care including prognosis, differential diagnosis, risks and benefits of treatment, instructions, compliance risk reductions as well as non-face-to-face time personally spent reviewing medial record, medical documentation, and coordination of care.     EST MINUTES X   [id number] 10-19    [id number] 20-29    27929 30-39 X   [id number] 40-54    NEW     05976 15-29    12285 30-44    03684 45-59    19127 60-74    PHONE      5-10    [id number] 11-20    44368 21-30

## 2022-09-20 ENCOUNTER — HOSPITAL ENCOUNTER (OUTPATIENT)
Dept: CARDIOLOGY | Facility: HOSPITAL | Age: 37
Discharge: HOME | End: 2022-09-20
Attending: ORTHOPAEDIC SURGERY
Payer: COMMERCIAL

## 2022-09-20 VITALS
HEART RATE: 69 BPM | WEIGHT: 168 LBS | HEIGHT: 66 IN | SYSTOLIC BLOOD PRESSURE: 111 MMHG | BODY MASS INDEX: 27 KG/M2 | DIASTOLIC BLOOD PRESSURE: 75 MMHG

## 2022-09-20 VITALS — BODY MASS INDEX: 27 KG/M2 | WEIGHT: 168 LBS | HEIGHT: 66 IN

## 2022-09-20 DIAGNOSIS — R07.89 INTERMITTENT LEFT-SIDED CHEST PAIN: ICD-10-CM

## 2022-09-20 LAB
ASCENDING AORTA: 2.55 CM
AV INDEX (PROSTH): 0.94
AV MEAN GRADIENT: 3 MMHG
AV PEAK GRADIENT: 5 MMHG
AV VALVE AREA: 3.09 CM2
AV VELOCITY RATIO: 1.04
BSA FOR ECHO PROCEDURE: 1.88 M2
CV ECHO LV RWT: 0.21 CM
CV STRESS BASE HR: 67 BPM
DIASTOLIC BLOOD PRESSURE: 83 MMHG
DOP CALC AO PEAK VEL: 1.13 M/S
DOP CALC AO VTI: 26.01 CM
DOP CALC LVOT AREA: 3.3 CM2
DOP CALC LVOT DIAMETER: 2.04 CM
DOP CALC LVOT PEAK VEL: 1.18 M/S
DOP CALC LVOT STROKE VOLUME: 80.27 CM3
DOP CALC MV VTI: 14.26 CM
DOP CALCLVOT PEAK VEL VTI: 24.57 CM
E WAVE DECELERATION TIME: 193.61 MSEC
E/A RATIO: 1.31
E/E' RATIO: 5.93 M/S
ECHO LV POSTERIOR WALL: 0.47 CM (ref 0.6–1.1)
EJECTION FRACTION: 55 %
FRACTIONAL SHORTENING: 31 % (ref 28–44)
INTERVENTRICULAR SEPTUM: 0.39 CM (ref 0.6–1.1)
IVRT: 65.65 MSEC
LA MAJOR: 4.84 CM
LA MINOR: 4.64 CM
LA WIDTH: 3.82 CM
LEFT ATRIUM SIZE: 3.3 CM
LEFT ATRIUM VOLUME INDEX MOD: 24.6 ML/M2
LEFT ATRIUM VOLUME INDEX: 27.3 ML/M2
LEFT ATRIUM VOLUME MOD: 45.69 CM3
LEFT ATRIUM VOLUME: 50.77 CM3
LEFT INTERNAL DIMENSION IN SYSTOLE: 3.05 CM (ref 2.1–4)
LEFT VENTRICLE DIASTOLIC VOLUME INDEX: 48.34 ML/M2
LEFT VENTRICLE DIASTOLIC VOLUME: 89.92 ML
LEFT VENTRICLE MASS INDEX: 28 G/M2
LEFT VENTRICLE SYSTOLIC VOLUME INDEX: 19.6 ML/M2
LEFT VENTRICLE SYSTOLIC VOLUME: 36.37 ML
LEFT VENTRICULAR INTERNAL DIMENSION IN DIASTOLE: 4.45 CM (ref 3.5–6)
LEFT VENTRICULAR MASS: 51.85 G
LV LATERAL E/E' RATIO: 5.56 M/S
LV SEPTAL E/E' RATIO: 6.36 M/S
MV A" WAVE DURATION": 9.13 MSEC
MV MEAN GRADIENT: 0 MMHG
MV PEAK A VEL: 0.68 M/S
MV PEAK E VEL: 0.89 M/S
MV PEAK GRADIENT: 2 MMHG
MV STENOSIS PRESSURE HALF TIME: 67.84 MS
MV VALVE AREA BY CONTINUITY EQUATION: 5.63 CM2
MV VALVE AREA P 1/2 METHOD: 3.24 CM2
OHS CV CPX 1 MINUTE RECOVERY HEART RATE: 150 BPM
OHS CV CPX 85 PERCENT MAX PREDICTED HEART RATE MALE: 148
OHS CV CPX ESTIMATED METS: 13
OHS CV CPX MAX PREDICTED HEART RATE: 174
OHS CV CPX PATIENT IS FEMALE: 1
OHS CV CPX PATIENT IS MALE: 0
OHS CV CPX PEAK DIASTOLIC BLOOD PRESSURE: 89 MMHG
OHS CV CPX PEAK HEAR RATE: 173 BPM
OHS CV CPX PEAK RATE PRESSURE PRODUCT: NORMAL
OHS CV CPX PEAK SYSTOLIC BLOOD PRESSURE: 168 MMHG
OHS CV CPX PERCENT MAX PREDICTED HEART RATE ACHIEVED: 99
OHS CV CPX RATE PRESSURE PRODUCT PRESENTING: 8643
PISA MRMAX VEL: 0.05 M/S
PISA TR MAX VEL: 1.93 M/S
PULM VEIN S/D RATIO: 0.81
PV PEAK D VEL: 0.52 M/S
PV PEAK S VEL: 0.42 M/S
RA MAJOR: 4.3 CM
RA PRESSURE: 3 MMHG
RA WIDTH: 3.66 CM
RIGHT VENTRICULAR END-DIASTOLIC DIMENSION: 3.24 CM
RV TISSUE DOPPLER FREE WALL SYSTOLIC VELOCITY 1 (APICAL 4 CHAMBER VIEW): 14.44 CM/S
SINUS: 2.35 CM
STJ: 2.08 CM
STRESS ECHO POST EXERCISE DUR MIN: 8 MINUTES
STRESS ECHO POST EXERCISE DUR SEC: 0 SECONDS
SYSTOLIC BLOOD PRESSURE: 129 MMHG
TDI LATERAL: 0.16 M/S
TDI SEPTAL: 0.14 M/S
TDI: 0.15 M/S
TR MAX PG: 15 MMHG
TRICUSPID ANNULAR PLANE SYSTOLIC EXCURSION: 2.36 CM
TV REST PULMONARY ARTERY PRESSURE: 18 MMHG

## 2022-09-20 PROCEDURE — 93306 ECHO (CUPID ONLY): ICD-10-PCS | Mod: 26,,, | Performed by: INTERNAL MEDICINE

## 2022-09-20 PROCEDURE — 93306 TTE W/DOPPLER COMPLETE: CPT | Mod: 26,,, | Performed by: INTERNAL MEDICINE

## 2022-09-20 PROCEDURE — 93018 EXERCISE STRESS - EKG (CUPID ONLY): ICD-10-PCS | Mod: ,,, | Performed by: INTERNAL MEDICINE

## 2022-09-20 PROCEDURE — 93016 CV STRESS TEST SUPVJ ONLY: CPT | Mod: ,,, | Performed by: INTERNAL MEDICINE

## 2022-09-20 PROCEDURE — 93018 CV STRESS TEST I&R ONLY: CPT | Mod: ,,, | Performed by: INTERNAL MEDICINE

## 2022-09-20 PROCEDURE — 93017 CV STRESS TEST TRACING ONLY: CPT

## 2022-09-20 PROCEDURE — 93016 EXERCISE STRESS - EKG (CUPID ONLY): ICD-10-PCS | Mod: ,,, | Performed by: INTERNAL MEDICINE

## 2022-09-20 PROCEDURE — 93306 TTE W/DOPPLER COMPLETE: CPT

## 2022-10-03 ENCOUNTER — OFFICE VISIT (OUTPATIENT)
Dept: OPTOMETRY | Facility: CLINIC | Age: 37
End: 2022-10-03
Payer: COMMERCIAL

## 2022-10-03 DIAGNOSIS — H52.13 MYOPIA OF BOTH EYES WITH ASTIGMATISM: Primary | ICD-10-CM

## 2022-10-03 DIAGNOSIS — H52.203 MYOPIA OF BOTH EYES WITH ASTIGMATISM: Primary | ICD-10-CM

## 2022-10-03 DIAGNOSIS — Z01.00 ENCOUNTER FOR ROUTINE EYE AND VISION EXAMINATION: ICD-10-CM

## 2022-10-03 DIAGNOSIS — Z97.3 WEARS CONTACT LENSES: ICD-10-CM

## 2022-10-03 PROCEDURE — 92310 PR CONTACT LENS FITTING (NO CHANGE): ICD-10-PCS | Mod: S$GLB,,, | Performed by: OPTOMETRIST

## 2022-10-03 PROCEDURE — 92004 PR EYE EXAM, NEW PATIENT,COMPREHESV: ICD-10-PCS | Mod: S$GLB,,, | Performed by: OPTOMETRIST

## 2022-10-03 PROCEDURE — 99999 PR PBB SHADOW E&M-EST. PATIENT-LVL II: ICD-10-PCS | Mod: PBBFAC,,, | Performed by: OPTOMETRIST

## 2022-10-03 PROCEDURE — 92004 COMPRE OPH EXAM NEW PT 1/>: CPT | Mod: S$GLB,,, | Performed by: OPTOMETRIST

## 2022-10-03 PROCEDURE — 92310 CONTACT LENS FITTING OU: CPT | Mod: S$GLB,,, | Performed by: OPTOMETRIST

## 2022-10-03 PROCEDURE — 99999 PR PBB SHADOW E&M-EST. PATIENT-LVL II: CPT | Mod: PBBFAC,,, | Performed by: OPTOMETRIST

## 2022-10-03 PROCEDURE — 92015 PR REFRACTION: ICD-10-PCS | Mod: S$GLB,,, | Performed by: OPTOMETRIST

## 2022-10-03 PROCEDURE — 92015 DETERMINE REFRACTIVE STATE: CPT | Mod: S$GLB,,, | Performed by: OPTOMETRIST

## 2022-10-03 NOTE — PROGRESS NOTES
HPI    Presents today for routine eye exam with contacts. Pt wears Biofinity   Toric contacts with no complaints. Pt reports increase in floaters x 3   months--no flashes. Not using any eye meds.  Last edited by Nicole Brown MA on 10/3/2022  8:31 AM.        ROS    Positive for: Eyes  Negative for: Constitutional, Gastrointestinal, Neurological, Skin,   Genitourinary, Musculoskeletal, HENT, Endocrine, Cardiovascular,   Respiratory, Psychiatric, Allergic/Imm, Heme/Lymph  Last edited by Mercy Hernandez, MARIELLA on 10/3/2022  8:48 AM.        Assessment /Plan     For exam results, see Encounter Report.    Myopia of both eyes with astigmatism    Encounter for routine eye and vision examination    Wears contact lenses      DISCUSSED SIGNS AND SYMPTOMS OF RD.   AT'S RECOMMENDED FOR COMFORT.   UPDATED GLASSES AND CL RX GIVEN TO PT.   RTC 1 YEAR FOR CVE W/DFE.

## 2022-10-10 ENCOUNTER — PATIENT MESSAGE (OUTPATIENT)
Dept: OPTOMETRY | Facility: CLINIC | Age: 37
End: 2022-10-10
Payer: COMMERCIAL

## 2022-10-25 ENCOUNTER — OFFICE VISIT (OUTPATIENT)
Dept: URGENT CARE | Facility: CLINIC | Age: 37
End: 2022-10-25
Payer: COMMERCIAL

## 2022-10-25 VITALS
OXYGEN SATURATION: 97 % | BODY MASS INDEX: 27 KG/M2 | DIASTOLIC BLOOD PRESSURE: 71 MMHG | SYSTOLIC BLOOD PRESSURE: 109 MMHG | HEIGHT: 66 IN | TEMPERATURE: 98 F | WEIGHT: 168 LBS | HEART RATE: 78 BPM | RESPIRATION RATE: 18 BRPM

## 2022-10-25 DIAGNOSIS — J06.9 VIRAL URI: Primary | ICD-10-CM

## 2022-10-25 DIAGNOSIS — J02.9 SORE THROAT: ICD-10-CM

## 2022-10-25 LAB
CTP QC/QA: YES
POC MOLECULAR INFLUENZA A AGN: NEGATIVE
POC MOLECULAR INFLUENZA B AGN: NEGATIVE

## 2022-10-25 PROCEDURE — 87502 POCT INFLUENZA A/B MOLECULAR: ICD-10-PCS | Mod: QW,S$GLB,, | Performed by: NURSE PRACTITIONER

## 2022-10-25 PROCEDURE — 99213 PR OFFICE/OUTPT VISIT, EST, LEVL III, 20-29 MIN: ICD-10-PCS | Mod: S$GLB,,, | Performed by: NURSE PRACTITIONER

## 2022-10-25 PROCEDURE — 3078F PR MOST RECENT DIASTOLIC BLOOD PRESSURE < 80 MM HG: ICD-10-PCS | Mod: CPTII,S$GLB,, | Performed by: NURSE PRACTITIONER

## 2022-10-25 PROCEDURE — 3074F SYST BP LT 130 MM HG: CPT | Mod: CPTII,S$GLB,, | Performed by: NURSE PRACTITIONER

## 2022-10-25 PROCEDURE — 99213 OFFICE O/P EST LOW 20 MIN: CPT | Mod: S$GLB,,, | Performed by: NURSE PRACTITIONER

## 2022-10-25 PROCEDURE — 1159F PR MEDICATION LIST DOCUMENTED IN MEDICAL RECORD: ICD-10-PCS | Mod: CPTII,S$GLB,, | Performed by: NURSE PRACTITIONER

## 2022-10-25 PROCEDURE — 1160F RVW MEDS BY RX/DR IN RCRD: CPT | Mod: CPTII,S$GLB,, | Performed by: NURSE PRACTITIONER

## 2022-10-25 PROCEDURE — 3078F DIAST BP <80 MM HG: CPT | Mod: CPTII,S$GLB,, | Performed by: NURSE PRACTITIONER

## 2022-10-25 PROCEDURE — 1160F PR REVIEW ALL MEDS BY PRESCRIBER/CLIN PHARMACIST DOCUMENTED: ICD-10-PCS | Mod: CPTII,S$GLB,, | Performed by: NURSE PRACTITIONER

## 2022-10-25 PROCEDURE — 3074F PR MOST RECENT SYSTOLIC BLOOD PRESSURE < 130 MM HG: ICD-10-PCS | Mod: CPTII,S$GLB,, | Performed by: NURSE PRACTITIONER

## 2022-10-25 PROCEDURE — 87502 INFLUENZA DNA AMP PROBE: CPT | Mod: QW,S$GLB,, | Performed by: NURSE PRACTITIONER

## 2022-10-25 PROCEDURE — 1159F MED LIST DOCD IN RCRD: CPT | Mod: CPTII,S$GLB,, | Performed by: NURSE PRACTITIONER

## 2022-10-25 NOTE — PROGRESS NOTES
"Subjective:       Patient ID: Christie Bay is a 37 y.o. female.    Vitals:  height is 5' 6" (1.676 m) and weight is 76.2 kg (168 lb). Her temperature is 97.6 °F (36.4 °C). Her blood pressure is 109/71 and her pulse is 78. Her respiration is 18 and oxygen saturation is 97%.     Chief Complaint: Sore Throat    Pt is a 36 yo female w/ c/o sore throat and congestion since yesterday. States exposure to RSV and Pneumonia from her son. She has taken dayquil for her symptoms with little relief. She states the sore throat feels like razor blades when she swallows. Denies fever.     Sore Throat   This is a new problem. The current episode started yesterday. The problem has been waxing and waning. Neither side of throat is experiencing more pain than the other. There has been no fever. Associated symptoms include congestion, headaches and trouble swallowing. Pertinent negatives include no coughing, diarrhea, neck pain, shortness of breath or vomiting. She has had no exposure to strep or mono. Exposure to: RSV and Pneumonia. Treatments tried: DayQuil.     HENT:  Positive for congestion, sore throat and trouble swallowing.    Neck: Negative for neck pain.   Respiratory:  Negative for cough and shortness of breath.    Gastrointestinal:  Negative for vomiting and diarrhea.   Neurological:  Positive for headaches.     Objective:      Physical Exam   Constitutional: She is oriented to person, place, and time. She appears well-developed. She is cooperative.  Non-toxic appearance. She does not appear ill. No distress.   HENT:   Head: Normocephalic and atraumatic.   Ears:   Right Ear: Hearing, tympanic membrane, external ear and ear canal normal.   Left Ear: Hearing, tympanic membrane, external ear and ear canal normal.   Nose: Mucosal edema present. No rhinorrhea or nasal deformity. No epistaxis. Right sinus exhibits no maxillary sinus tenderness and no frontal sinus tenderness. Left sinus exhibits no maxillary sinus " tenderness and no frontal sinus tenderness.   Mouth/Throat: Uvula is midline and mucous membranes are normal. No trismus in the jaw. Normal dentition. No uvula swelling. Posterior oropharyngeal erythema present. No oropharyngeal exudate or posterior oropharyngeal edema. Tonsils are 2+ on the right. Tonsils are 2+ on the left. No tonsillar exudate.   Eyes: Conjunctivae and lids are normal. No scleral icterus.   Neck: Trachea normal and phonation normal. Neck supple. No edema present. No erythema present. No neck rigidity present.   Cardiovascular: Normal rate, regular rhythm, normal heart sounds and normal pulses.   Pulmonary/Chest: Effort normal and breath sounds normal. No respiratory distress. She has no decreased breath sounds. She has no rhonchi.   Abdominal: Normal appearance.   Musculoskeletal: Normal range of motion.         General: No deformity. Normal range of motion.   Neurological: She is alert and oriented to person, place, and time. She exhibits normal muscle tone. Coordination normal.   Skin: Skin is warm, dry, intact, not diaphoretic and not pale.   Psychiatric: Her speech is normal and behavior is normal. Judgment and thought content normal.   Nursing note and vitals reviewed.    Results for orders placed or performed in visit on 10/25/22   POCT Influenza A/B Molecular   Result Value Ref Range    POC Molecular Influenza A Ag Negative Negative, Not Reported    POC Molecular Influenza B Ag Negative Negative, Not Reported     Acceptable Yes            Assessment:       1. Viral URI    2. Sore throat          Plan:         Viral URI    Sore throat  -     POCT Influenza A/B Molecular  -     (Magic mouthwash) 1:1:1 diphenhydrAMINE(Benadryl) 12.5mg/5ml liq, aluminum & magnesium hydroxide-simethicone (Maalox), LIDOcaine viscous 2%; Swish and spit 10 mLs every 4 (four) hours as needed (sore throat).  Dispense: 180 mL; Refill: 0       Patient Instructions   - You must understand that you have  received an Urgent Care treatment only and that you may be released before all of your medical problems are known or treated.   - You, the patient, will arrange for follow up care as instructed.   - If your condition worsens or fails to improve we recommend that you receive another evaluation at the ER immediately or contact your PCP to discuss your concerns.   - You can call [phone] or [phone] to help schedule an appointment with the appropriate provider.    Drink plenty of fluids   Get lots of rest  Tylenol or ibuprofen for pain/fever  Mucinex DM for cough   Saline nasal rinses to irrigate sinus cavities  Warm salt water gargles for sore throat

## 2022-10-25 NOTE — PATIENT INSTRUCTIONS
- You must understand that you have received an Urgent Care treatment only and that you may be released before all of your medical problems are known or treated.   - You, the patient, will arrange for follow up care as instructed.   - If your condition worsens or fails to improve we recommend that you receive another evaluation at the ER immediately or contact your PCP to discuss your concerns.   - You can call [phone] or [phone] to help schedule an appointment with the appropriate provider.    Drink plenty of fluids   Get lots of rest  Tylenol or ibuprofen for pain/fever  Mucinex DM for cough   Saline nasal rinses to irrigate sinus cavities  Warm salt water gargles for sore throat

## 2022-12-16 ENCOUNTER — PATIENT MESSAGE (OUTPATIENT)
Dept: OPTOMETRY | Facility: CLINIC | Age: 37
End: 2022-12-16
Payer: COMMERCIAL

## 2023-01-04 ENCOUNTER — PATIENT MESSAGE (OUTPATIENT)
Dept: OPTOMETRY | Facility: CLINIC | Age: 38
End: 2023-01-04
Payer: COMMERCIAL

## 2023-01-11 ENCOUNTER — OFFICE VISIT (OUTPATIENT)
Dept: URGENT CARE | Facility: CLINIC | Age: 38
End: 2023-01-11
Payer: COMMERCIAL

## 2023-01-11 VITALS
OXYGEN SATURATION: 99 % | HEART RATE: 80 BPM | WEIGHT: 168 LBS | BODY MASS INDEX: 27.12 KG/M2 | RESPIRATION RATE: 16 BRPM | TEMPERATURE: 98 F | DIASTOLIC BLOOD PRESSURE: 75 MMHG | SYSTOLIC BLOOD PRESSURE: 107 MMHG

## 2023-01-11 DIAGNOSIS — H65.193 ACUTE EFFUSION OF BOTH MIDDLE EARS: Primary | ICD-10-CM

## 2023-01-11 LAB
CTP QC/QA: YES
SARS-COV-2 AG RESP QL IA.RAPID: NEGATIVE

## 2023-01-11 PROCEDURE — 99214 OFFICE O/P EST MOD 30 MIN: CPT | Mod: S$GLB,,, | Performed by: FAMILY MEDICINE

## 2023-01-11 PROCEDURE — 3008F PR BODY MASS INDEX (BMI) DOCUMENTED: ICD-10-PCS | Mod: CPTII,S$GLB,, | Performed by: FAMILY MEDICINE

## 2023-01-11 PROCEDURE — 3008F BODY MASS INDEX DOCD: CPT | Mod: CPTII,S$GLB,, | Performed by: FAMILY MEDICINE

## 2023-01-11 PROCEDURE — 1159F PR MEDICATION LIST DOCUMENTED IN MEDICAL RECORD: ICD-10-PCS | Mod: CPTII,S$GLB,, | Performed by: FAMILY MEDICINE

## 2023-01-11 PROCEDURE — 99214 PR OFFICE/OUTPT VISIT, EST, LEVL IV, 30-39 MIN: ICD-10-PCS | Mod: S$GLB,,, | Performed by: FAMILY MEDICINE

## 2023-01-11 PROCEDURE — 1159F MED LIST DOCD IN RCRD: CPT | Mod: CPTII,S$GLB,, | Performed by: FAMILY MEDICINE

## 2023-01-11 PROCEDURE — 87811 SARS CORONAVIRUS 2 ANTIGEN POCT, MANUAL READ: ICD-10-PCS | Mod: QW,S$GLB,, | Performed by: FAMILY MEDICINE

## 2023-01-11 PROCEDURE — 3078F DIAST BP <80 MM HG: CPT | Mod: CPTII,S$GLB,, | Performed by: FAMILY MEDICINE

## 2023-01-11 PROCEDURE — 3074F SYST BP LT 130 MM HG: CPT | Mod: CPTII,S$GLB,, | Performed by: FAMILY MEDICINE

## 2023-01-11 PROCEDURE — 3074F PR MOST RECENT SYSTOLIC BLOOD PRESSURE < 130 MM HG: ICD-10-PCS | Mod: CPTII,S$GLB,, | Performed by: FAMILY MEDICINE

## 2023-01-11 PROCEDURE — 87811 SARS-COV-2 COVID19 W/OPTIC: CPT | Mod: QW,S$GLB,, | Performed by: FAMILY MEDICINE

## 2023-01-11 PROCEDURE — 3078F PR MOST RECENT DIASTOLIC BLOOD PRESSURE < 80 MM HG: ICD-10-PCS | Mod: CPTII,S$GLB,, | Performed by: FAMILY MEDICINE

## 2023-01-11 RX ORDER — FLUTICASONE PROPIONATE 50 MCG
1 SPRAY, SUSPENSION (ML) NASAL DAILY
Qty: 16 G | Refills: 0 | Status: SHIPPED | OUTPATIENT
Start: 2023-01-11 | End: 2023-01-31

## 2023-01-11 NOTE — PROGRESS NOTES
Subjective:       Patient ID: Christie Bay is a 37 y.o. female.    Vitals:  weight is 76.2 kg (168 lb). Her oral temperature is 98.3 °F (36.8 °C). Her blood pressure is 107/75 and her pulse is 80. Her respiration is 16 and oxygen saturation is 99%.     Chief Complaint: Nasal Congestion and Otalgia (/)    This is a 37 y.o. female who presents today with a chief complaint of nasal congestion, sore throat, right ear feels full, headache, coughing x 4 days    Home Tx: Dayquil, Nyquil, Excedrin     PMH: Migraines     Sinusitis  The current episode started in the past 7 days. The problem is unchanged. There has been no fever. Her pain is at a severity of 6/10. Associated symptoms include congestion, coughing, ear pain, headaches, sinus pressure and a sore throat. Pertinent negatives include no chills, hoarse voice or shortness of breath.     Constitution: Negative for chills.   HENT:  Positive for ear pain, congestion, sinus pressure and sore throat.    Respiratory:  Positive for cough. Negative for shortness of breath.    Neurological:  Positive for headaches.     Objective:      Physical Exam   Constitutional: She does not appear ill. No distress. normal  HENT:   Head: Normocephalic.   Nose: Congestion present.   Mouth/Throat: Mucous membranes are moist. No posterior oropharyngeal erythema.   Eyes: Pupils are equal, round, and reactive to light. Extraocular movement intact   Cardiovascular: Normal rate, regular rhythm, normal heart sounds and normal pulses.   Pulmonary/Chest: Effort normal and breath sounds normal.   Abdominal: Normal appearance.   Neurological: She is alert.   Nursing note and vitals reviewed.      Assessment:       1. Acute effusion of both middle ears          Plan:         Acute effusion of both middle ears  -     SARS Coronavirus 2 Antigen, POCT Manual Read  -     fluticasone propionate (FLONASE) 50 mcg/actuation nasal spray; 1 spray (50 mcg total) by Each Nostril route once  daily.  Dispense: 9.9 mL; Refill: 0    Recommended mucinex-D OTC

## 2023-01-31 ENCOUNTER — OFFICE VISIT (OUTPATIENT)
Dept: FAMILY MEDICINE | Facility: CLINIC | Age: 38
End: 2023-01-31
Payer: COMMERCIAL

## 2023-01-31 VITALS
SYSTOLIC BLOOD PRESSURE: 108 MMHG | HEIGHT: 66 IN | TEMPERATURE: 98 F | HEART RATE: 77 BPM | BODY MASS INDEX: 27.86 KG/M2 | DIASTOLIC BLOOD PRESSURE: 82 MMHG | WEIGHT: 173.38 LBS | OXYGEN SATURATION: 99 %

## 2023-01-31 DIAGNOSIS — Z13.0 SCREENING FOR BLOOD DISEASE: ICD-10-CM

## 2023-01-31 DIAGNOSIS — Z00.00 WELLNESS EXAMINATION: Primary | ICD-10-CM

## 2023-01-31 DIAGNOSIS — Z13.6 ENCOUNTER FOR SCREENING FOR CARDIOVASCULAR DISORDERS: ICD-10-CM

## 2023-01-31 DIAGNOSIS — Z13.1 SCREENING FOR DIABETES MELLITUS: ICD-10-CM

## 2023-01-31 PROCEDURE — 3008F PR BODY MASS INDEX (BMI) DOCUMENTED: ICD-10-PCS | Mod: CPTII,S$GLB,, | Performed by: STUDENT IN AN ORGANIZED HEALTH CARE EDUCATION/TRAINING PROGRAM

## 2023-01-31 PROCEDURE — 1159F PR MEDICATION LIST DOCUMENTED IN MEDICAL RECORD: ICD-10-PCS | Mod: CPTII,S$GLB,, | Performed by: STUDENT IN AN ORGANIZED HEALTH CARE EDUCATION/TRAINING PROGRAM

## 2023-01-31 PROCEDURE — 3074F PR MOST RECENT SYSTOLIC BLOOD PRESSURE < 130 MM HG: ICD-10-PCS | Mod: CPTII,S$GLB,, | Performed by: STUDENT IN AN ORGANIZED HEALTH CARE EDUCATION/TRAINING PROGRAM

## 2023-01-31 PROCEDURE — 3044F HG A1C LEVEL LT 7.0%: CPT | Mod: CPTII,S$GLB,, | Performed by: STUDENT IN AN ORGANIZED HEALTH CARE EDUCATION/TRAINING PROGRAM

## 2023-01-31 PROCEDURE — 3074F SYST BP LT 130 MM HG: CPT | Mod: CPTII,S$GLB,, | Performed by: STUDENT IN AN ORGANIZED HEALTH CARE EDUCATION/TRAINING PROGRAM

## 2023-01-31 PROCEDURE — 99395 PR PREVENTIVE VISIT,EST,18-39: ICD-10-PCS | Mod: S$GLB,,, | Performed by: STUDENT IN AN ORGANIZED HEALTH CARE EDUCATION/TRAINING PROGRAM

## 2023-01-31 PROCEDURE — 1160F PR REVIEW ALL MEDS BY PRESCRIBER/CLIN PHARMACIST DOCUMENTED: ICD-10-PCS | Mod: CPTII,S$GLB,, | Performed by: STUDENT IN AN ORGANIZED HEALTH CARE EDUCATION/TRAINING PROGRAM

## 2023-01-31 PROCEDURE — 3079F PR MOST RECENT DIASTOLIC BLOOD PRESSURE 80-89 MM HG: ICD-10-PCS | Mod: CPTII,S$GLB,, | Performed by: STUDENT IN AN ORGANIZED HEALTH CARE EDUCATION/TRAINING PROGRAM

## 2023-01-31 PROCEDURE — 3044F PR MOST RECENT HEMOGLOBIN A1C LEVEL <7.0%: ICD-10-PCS | Mod: CPTII,S$GLB,, | Performed by: STUDENT IN AN ORGANIZED HEALTH CARE EDUCATION/TRAINING PROGRAM

## 2023-01-31 PROCEDURE — 99395 PREV VISIT EST AGE 18-39: CPT | Mod: S$GLB,,, | Performed by: STUDENT IN AN ORGANIZED HEALTH CARE EDUCATION/TRAINING PROGRAM

## 2023-01-31 PROCEDURE — 99999 PR PBB SHADOW E&M-EST. PATIENT-LVL IV: CPT | Mod: PBBFAC,,, | Performed by: STUDENT IN AN ORGANIZED HEALTH CARE EDUCATION/TRAINING PROGRAM

## 2023-01-31 PROCEDURE — 99999 PR PBB SHADOW E&M-EST. PATIENT-LVL IV: ICD-10-PCS | Mod: PBBFAC,,, | Performed by: STUDENT IN AN ORGANIZED HEALTH CARE EDUCATION/TRAINING PROGRAM

## 2023-01-31 PROCEDURE — 1159F MED LIST DOCD IN RCRD: CPT | Mod: CPTII,S$GLB,, | Performed by: STUDENT IN AN ORGANIZED HEALTH CARE EDUCATION/TRAINING PROGRAM

## 2023-01-31 PROCEDURE — 1160F RVW MEDS BY RX/DR IN RCRD: CPT | Mod: CPTII,S$GLB,, | Performed by: STUDENT IN AN ORGANIZED HEALTH CARE EDUCATION/TRAINING PROGRAM

## 2023-01-31 PROCEDURE — 3008F BODY MASS INDEX DOCD: CPT | Mod: CPTII,S$GLB,, | Performed by: STUDENT IN AN ORGANIZED HEALTH CARE EDUCATION/TRAINING PROGRAM

## 2023-01-31 PROCEDURE — 3079F DIAST BP 80-89 MM HG: CPT | Mod: CPTII,S$GLB,, | Performed by: STUDENT IN AN ORGANIZED HEALTH CARE EDUCATION/TRAINING PROGRAM

## 2023-01-31 NOTE — PROGRESS NOTES
"Subjective:       Patient ID: Christie Bya is a 37 y.o. female.    Chief Complaint: Establish Care (Pt here to est care )    No problems and no chronic meds     Migraines    Has one headache per month that lasts approximately 4-5 days usually occurring one week prior to start of menstrual cycle. Takes Excedrin for migraine. States this "takes the edge off". If symptoms not relieved, takes Imitrex as needed. States pain occurs behind both eyes and + photo sensitivity and nausea during episodes. Neurology seen 9/2022 when states length of migraines have gotten longer. Prior to 9/2022 states episodes lasted approximately 1-1.5 days.        Review of Systems   Constitutional: Negative.    Respiratory: Negative.     Cardiovascular: Negative.    Neurological:  Positive for headaches.      A1C:      CBC:  Recent Labs   Lab 04/12/21  1838 05/10/21  1312 09/29/21  1019   WBC 5.98 5.88 7.31   RBC 4.20 4.15 4.38   Hemoglobin 13.6 13.6 14.0   Hematocrit 38.6 39.4 41.6   Platelets 185 196 254   MCV 92 95 95   MCH 32.4 H 32.8 H 32.0 H   MCHC 35.2 34.5 33.7     CMP:  Recent Labs   Lab 07/16/20  1302 04/12/21  1838 09/29/21  1019   Glucose 92 87 92   Calcium 8.3 L 9.2 9.5   Albumin 3.3 L 4.2 4.5   Total Protein 6.5 7.4 7.6   Sodium 142 141 137   Potassium 3.4 L 3.8 4.7   CO2 21 L 25 29   Chloride 109 107 102   BUN 8 11 11   Creatinine 0.7 0.61 0.64   Alkaline Phosphatase 109 62 72   ALT 14 19 21   AST 15 36 40   Total Bilirubin 0.5 0.7 1.0     LIPIDS:  Recent Labs   Lab 05/10/21  1312   TSH 0.809     TSH:  Recent Labs   Lab 05/10/21  1312   TSH 0.809        Objective:      Vitals:    01/31/23 1356   BP: 108/82   Pulse: 77   Temp: 98.4 °F (36.9 °C)      Physical Exam  Constitutional:       Appearance: Normal appearance.   HENT:      Head: Normocephalic and atraumatic.      Nose: Nose normal.   Eyes:      Extraocular Movements: Extraocular movements intact.      Conjunctiva/sclera: Conjunctivae normal. "   Cardiovascular:      Rate and Rhythm: Normal rate and regular rhythm.      Heart sounds: Normal heart sounds.   Pulmonary:      Effort: Pulmonary effort is normal.      Breath sounds: Normal breath sounds.   Abdominal:      General: Abdomen is flat. Bowel sounds are normal.      Palpations: Abdomen is soft.   Neurological:      General: No focal deficit present.      Mental Status: She is alert.        Assessment:       1. Wellness examination    2. Encounter for screening for cardiovascular disorders    3. Screening for blood disease    4. Screening for diabetes mellitus          Plan:   1. Wellness examination  - Comprehensive Metabolic Panel; Standing  - Hemoglobin A1C; Standing  - CBC Without Differential; Standing  - TSH; Standing  - Lipid Panel; Standing    2. Encounter for screening for cardiovascular disorders  - Comprehensive Metabolic Panel; Standing  - Hemoglobin A1C; Standing  - CBC Without Differential; Standing  - TSH; Standing  - Lipid Panel; Standing    3. Screening for blood disease  - Comprehensive Metabolic Panel; Standing  - CBC Without Differential; Standing    4. Screening for diabetes mellitus  - Hemoglobin A1C; Standing       LACI Mckinnon Student  [facility]   1/31/23     I hereby acknowledge that I am relying upon documentation authored by a medical student working under my supervision and further I hereby attest that I have verified the student documentation or findings by personally re-performing the physical exam and medical decision making activities of the Evaluation and Management service to be billed.    Well woman  Labs per orders  HM discussed: UTD   Continue healthy lifestyle efforts  Continue current meds as prescribed  Keep routine specialist f/u   RTC in 1 year and/or PRN        Yulisa Castrejon DO   [facility]  1/31/23

## 2023-02-01 ENCOUNTER — LAB VISIT (OUTPATIENT)
Dept: LAB | Facility: HOSPITAL | Age: 38
End: 2023-02-01
Attending: STUDENT IN AN ORGANIZED HEALTH CARE EDUCATION/TRAINING PROGRAM
Payer: COMMERCIAL

## 2023-02-01 DIAGNOSIS — Z13.1 SCREENING FOR DIABETES MELLITUS: ICD-10-CM

## 2023-02-01 DIAGNOSIS — Z00.00 WELLNESS EXAMINATION: ICD-10-CM

## 2023-02-01 DIAGNOSIS — Z13.0 SCREENING FOR BLOOD DISEASE: ICD-10-CM

## 2023-02-01 DIAGNOSIS — Z13.6 ENCOUNTER FOR SCREENING FOR CARDIOVASCULAR DISORDERS: ICD-10-CM

## 2023-02-01 LAB
ALBUMIN SERPL BCP-MCNC: 4.2 G/DL (ref 3.5–5.2)
ALP SERPL-CCNC: 75 U/L (ref 55–135)
ALT SERPL W/O P-5'-P-CCNC: 16 U/L (ref 10–44)
ANION GAP SERPL CALC-SCNC: 9 MMOL/L (ref 8–16)
AST SERPL-CCNC: 21 U/L (ref 10–40)
BILIRUB SERPL-MCNC: 0.7 MG/DL (ref 0.1–1)
BUN SERPL-MCNC: 10 MG/DL (ref 6–20)
CALCIUM SERPL-MCNC: 9.7 MG/DL (ref 8.7–10.5)
CHLORIDE SERPL-SCNC: 104 MMOL/L (ref 95–110)
CHOLEST SERPL-MCNC: 209 MG/DL (ref 120–199)
CHOLEST/HDLC SERPL: 3.2 {RATIO} (ref 2–5)
CO2 SERPL-SCNC: 28 MMOL/L (ref 23–29)
CREAT SERPL-MCNC: 0.7 MG/DL (ref 0.5–1.4)
ERYTHROCYTE [DISTWIDTH] IN BLOOD BY AUTOMATED COUNT: 11.6 % (ref 11.5–14.5)
EST. GFR  (NO RACE VARIABLE): >60 ML/MIN/1.73 M^2
ESTIMATED AVG GLUCOSE: 100 MG/DL (ref 68–131)
GLUCOSE SERPL-MCNC: 93 MG/DL (ref 70–110)
HBA1C MFR BLD: 5.1 % (ref 4–5.6)
HCT VFR BLD AUTO: 41.9 % (ref 37–48.5)
HDLC SERPL-MCNC: 65 MG/DL (ref 40–75)
HDLC SERPL: 31.1 % (ref 20–50)
HGB BLD-MCNC: 13.9 G/DL (ref 12–16)
LDLC SERPL CALC-MCNC: 133.8 MG/DL (ref 63–159)
MCH RBC QN AUTO: 31.4 PG (ref 27–31)
MCHC RBC AUTO-ENTMCNC: 33.2 G/DL (ref 32–36)
MCV RBC AUTO: 95 FL (ref 82–98)
NONHDLC SERPL-MCNC: 144 MG/DL
PLATELET # BLD AUTO: 226 K/UL (ref 150–450)
PMV BLD AUTO: 11 FL (ref 9.2–12.9)
POTASSIUM SERPL-SCNC: 4.6 MMOL/L (ref 3.5–5.1)
PROT SERPL-MCNC: 7.3 G/DL (ref 6–8.4)
RBC # BLD AUTO: 4.42 M/UL (ref 4–5.4)
SODIUM SERPL-SCNC: 141 MMOL/L (ref 136–145)
TRIGL SERPL-MCNC: 51 MG/DL (ref 30–150)
TSH SERPL DL<=0.005 MIU/L-ACNC: 0.86 UIU/ML (ref 0.4–4)
WBC # BLD AUTO: 5.86 K/UL (ref 3.9–12.7)

## 2023-02-01 PROCEDURE — 84443 ASSAY THYROID STIM HORMONE: CPT | Performed by: STUDENT IN AN ORGANIZED HEALTH CARE EDUCATION/TRAINING PROGRAM

## 2023-02-01 PROCEDURE — 80053 COMPREHEN METABOLIC PANEL: CPT | Performed by: STUDENT IN AN ORGANIZED HEALTH CARE EDUCATION/TRAINING PROGRAM

## 2023-02-01 PROCEDURE — 83036 HEMOGLOBIN GLYCOSYLATED A1C: CPT | Performed by: STUDENT IN AN ORGANIZED HEALTH CARE EDUCATION/TRAINING PROGRAM

## 2023-02-01 PROCEDURE — 80061 LIPID PANEL: CPT | Performed by: STUDENT IN AN ORGANIZED HEALTH CARE EDUCATION/TRAINING PROGRAM

## 2023-02-01 PROCEDURE — 36415 COLL VENOUS BLD VENIPUNCTURE: CPT | Performed by: STUDENT IN AN ORGANIZED HEALTH CARE EDUCATION/TRAINING PROGRAM

## 2023-02-01 PROCEDURE — 85027 COMPLETE CBC AUTOMATED: CPT | Performed by: STUDENT IN AN ORGANIZED HEALTH CARE EDUCATION/TRAINING PROGRAM

## 2023-02-07 ENCOUNTER — CLINICAL SUPPORT (OUTPATIENT)
Dept: OTHER | Facility: CLINIC | Age: 38
End: 2023-02-07

## 2023-02-07 DIAGNOSIS — Z00.8 ENCOUNTER FOR OTHER GENERAL EXAMINATION: ICD-10-CM

## 2023-02-08 VITALS
WEIGHT: 172 LBS | HEIGHT: 66 IN | BODY MASS INDEX: 27.64 KG/M2 | SYSTOLIC BLOOD PRESSURE: 112 MMHG | DIASTOLIC BLOOD PRESSURE: 78 MMHG

## 2023-02-08 LAB
GLUCOSE SERPL-MCNC: 89 MG/DL (ref 60–140)
HDLC SERPL-MCNC: 68 MG/DL
POC CHOLESTEROL, LDL (DOCK): 113 MG/DL
POC CHOLESTEROL, TOTAL: 190 MG/DL
TRIGL SERPL-MCNC: 46 MG/DL

## 2023-04-24 ENCOUNTER — OFFICE VISIT (OUTPATIENT)
Dept: INTERNAL MEDICINE | Facility: CLINIC | Age: 38
End: 2023-04-24
Payer: COMMERCIAL

## 2023-04-24 VITALS
BODY MASS INDEX: 28.7 KG/M2 | DIASTOLIC BLOOD PRESSURE: 78 MMHG | HEART RATE: 77 BPM | OXYGEN SATURATION: 98 % | WEIGHT: 178.56 LBS | HEIGHT: 66 IN | SYSTOLIC BLOOD PRESSURE: 114 MMHG

## 2023-04-24 DIAGNOSIS — R05.8 ALLERGIC COUGH: Primary | ICD-10-CM

## 2023-04-24 DIAGNOSIS — J39.2 THROAT IRRITATION: ICD-10-CM

## 2023-04-24 PROCEDURE — 1159F MED LIST DOCD IN RCRD: CPT | Mod: CPTII,S$GLB,, | Performed by: INTERNAL MEDICINE

## 2023-04-24 PROCEDURE — 3078F PR MOST RECENT DIASTOLIC BLOOD PRESSURE < 80 MM HG: ICD-10-PCS | Mod: CPTII,S$GLB,, | Performed by: INTERNAL MEDICINE

## 2023-04-24 PROCEDURE — 1160F PR REVIEW ALL MEDS BY PRESCRIBER/CLIN PHARMACIST DOCUMENTED: ICD-10-PCS | Mod: CPTII,S$GLB,, | Performed by: INTERNAL MEDICINE

## 2023-04-24 PROCEDURE — 3008F PR BODY MASS INDEX (BMI) DOCUMENTED: ICD-10-PCS | Mod: CPTII,S$GLB,, | Performed by: INTERNAL MEDICINE

## 2023-04-24 PROCEDURE — 99999 PR PBB SHADOW E&M-EST. PATIENT-LVL III: CPT | Mod: PBBFAC,,, | Performed by: INTERNAL MEDICINE

## 2023-04-24 PROCEDURE — 3074F PR MOST RECENT SYSTOLIC BLOOD PRESSURE < 130 MM HG: ICD-10-PCS | Mod: CPTII,S$GLB,, | Performed by: INTERNAL MEDICINE

## 2023-04-24 PROCEDURE — 1159F PR MEDICATION LIST DOCUMENTED IN MEDICAL RECORD: ICD-10-PCS | Mod: CPTII,S$GLB,, | Performed by: INTERNAL MEDICINE

## 2023-04-24 PROCEDURE — 3044F HG A1C LEVEL LT 7.0%: CPT | Mod: CPTII,S$GLB,, | Performed by: INTERNAL MEDICINE

## 2023-04-24 PROCEDURE — 3074F SYST BP LT 130 MM HG: CPT | Mod: CPTII,S$GLB,, | Performed by: INTERNAL MEDICINE

## 2023-04-24 PROCEDURE — 3078F DIAST BP <80 MM HG: CPT | Mod: CPTII,S$GLB,, | Performed by: INTERNAL MEDICINE

## 2023-04-24 PROCEDURE — 99214 OFFICE O/P EST MOD 30 MIN: CPT | Mod: S$GLB,,, | Performed by: INTERNAL MEDICINE

## 2023-04-24 PROCEDURE — 1160F RVW MEDS BY RX/DR IN RCRD: CPT | Mod: CPTII,S$GLB,, | Performed by: INTERNAL MEDICINE

## 2023-04-24 PROCEDURE — 3008F BODY MASS INDEX DOCD: CPT | Mod: CPTII,S$GLB,, | Performed by: INTERNAL MEDICINE

## 2023-04-24 PROCEDURE — 3044F PR MOST RECENT HEMOGLOBIN A1C LEVEL <7.0%: ICD-10-PCS | Mod: CPTII,S$GLB,, | Performed by: INTERNAL MEDICINE

## 2023-04-24 PROCEDURE — 99999 PR PBB SHADOW E&M-EST. PATIENT-LVL III: ICD-10-PCS | Mod: PBBFAC,,, | Performed by: INTERNAL MEDICINE

## 2023-04-24 PROCEDURE — 99214 PR OFFICE/OUTPT VISIT, EST, LEVL IV, 30-39 MIN: ICD-10-PCS | Mod: S$GLB,,, | Performed by: INTERNAL MEDICINE

## 2023-04-24 RX ORDER — BENZONATATE 100 MG/1
100 CAPSULE ORAL 3 TIMES DAILY PRN
Qty: 15 CAPSULE | Refills: 0 | Status: SHIPPED | OUTPATIENT
Start: 2023-04-24 | End: 2023-12-21

## 2023-04-24 RX ORDER — FAMOTIDINE 20 MG/1
20 TABLET, FILM COATED ORAL 2 TIMES DAILY
Qty: 14 TABLET | Refills: 0 | Status: SHIPPED | OUTPATIENT
Start: 2023-04-24 | End: 2023-12-21

## 2023-04-24 NOTE — PROGRESS NOTES
[age] year female  For the past 3 weeks she is been having a persistent cough.  The cough is dry, nonproductive.  She will feel an irritative sensation deep in the throat as if some things poking in which will then induce a cough that can last for awhile and then calm down.  During this time no nasal head congestion.  There has been no postnasal drip rhinorrhea.  No ear pain or fever.  No shortness a breath or wheezing a times with cough and she will feel a tightness in her chest..  She will notice a cough more when lying down.  She is taking over-the-counter cough medicine the night just to help rest    Medical history   Migraine headache disorder for which in the past she is taking Imitrex, this has not been a problem recently    Examination   Weight 178   Pulse 76   Blood pressure 114/78  Pulse ox 98%   Tympanic membranes normal   Nasal mucosa is clear  Oropharynx slightly hyperemic  Neck is no palpable adenopathy  Chest clear breath sounds no wheezes or rales  Heart regular rate rhythm    Impression   Cough probably that of allergic cough  Throat irritation probably that of allergic irritation    Plan  For 1 week famotidine 20 mg twice a day and use of  Zyrtec at night.  Tessalon Perles was prescribed  If no improvement may consider course of this steroids, chest x-ray in the ENT referral

## 2023-08-10 ENCOUNTER — PATIENT MESSAGE (OUTPATIENT)
Dept: OBSTETRICS AND GYNECOLOGY | Facility: CLINIC | Age: 38
End: 2023-08-10
Payer: COMMERCIAL

## 2023-08-15 ENCOUNTER — OFFICE VISIT (OUTPATIENT)
Dept: SPORTS MEDICINE | Facility: CLINIC | Age: 38
End: 2023-08-15
Payer: COMMERCIAL

## 2023-08-15 ENCOUNTER — HOSPITAL ENCOUNTER (OUTPATIENT)
Dept: RADIOLOGY | Facility: HOSPITAL | Age: 38
Discharge: HOME | End: 2023-08-15
Attending: ORTHOPAEDIC SURGERY
Payer: COMMERCIAL

## 2023-08-15 VITALS
HEART RATE: 75 BPM | DIASTOLIC BLOOD PRESSURE: 68 MMHG | HEIGHT: 66 IN | BODY MASS INDEX: 27.99 KG/M2 | SYSTOLIC BLOOD PRESSURE: 102 MMHG | WEIGHT: 174.19 LBS

## 2023-08-15 DIAGNOSIS — M25.562 LEFT KNEE PAIN, UNSPECIFIED CHRONICITY: ICD-10-CM

## 2023-08-15 DIAGNOSIS — M23.92 INTERNAL DERANGEMENT OF LEFT KNEE: Primary | ICD-10-CM

## 2023-08-15 PROCEDURE — 3008F PR BODY MASS INDEX (BMI) DOCUMENTED: ICD-10-PCS | Mod: CPTII,S$GLB,, | Performed by: ORTHOPAEDIC SURGERY

## 2023-08-15 PROCEDURE — 73564 X-RAY EXAM KNEE 4 OR MORE: CPT | Mod: 26,50,, | Performed by: RADIOLOGY

## 2023-08-15 PROCEDURE — 1159F PR MEDICATION LIST DOCUMENTED IN MEDICAL RECORD: ICD-10-PCS | Mod: CPTII,S$GLB,, | Performed by: ORTHOPAEDIC SURGERY

## 2023-08-15 PROCEDURE — 3078F PR MOST RECENT DIASTOLIC BLOOD PRESSURE < 80 MM HG: ICD-10-PCS | Mod: CPTII,S$GLB,, | Performed by: ORTHOPAEDIC SURGERY

## 2023-08-15 PROCEDURE — 99213 PR OFFICE/OUTPT VISIT, EST, LEVL III, 20-29 MIN: ICD-10-PCS | Mod: S$GLB,,, | Performed by: ORTHOPAEDIC SURGERY

## 2023-08-15 PROCEDURE — 3074F SYST BP LT 130 MM HG: CPT | Mod: CPTII,S$GLB,, | Performed by: ORTHOPAEDIC SURGERY

## 2023-08-15 PROCEDURE — 3078F DIAST BP <80 MM HG: CPT | Mod: CPTII,S$GLB,, | Performed by: ORTHOPAEDIC SURGERY

## 2023-08-15 PROCEDURE — 3074F PR MOST RECENT SYSTOLIC BLOOD PRESSURE < 130 MM HG: ICD-10-PCS | Mod: CPTII,S$GLB,, | Performed by: ORTHOPAEDIC SURGERY

## 2023-08-15 PROCEDURE — 99999 PR PBB SHADOW E&M-EST. PATIENT-LVL III: CPT | Mod: PBBFAC,,, | Performed by: ORTHOPAEDIC SURGERY

## 2023-08-15 PROCEDURE — 3044F PR MOST RECENT HEMOGLOBIN A1C LEVEL <7.0%: ICD-10-PCS | Mod: CPTII,S$GLB,, | Performed by: ORTHOPAEDIC SURGERY

## 2023-08-15 PROCEDURE — 99213 OFFICE O/P EST LOW 20 MIN: CPT | Mod: S$GLB,,, | Performed by: ORTHOPAEDIC SURGERY

## 2023-08-15 PROCEDURE — 3044F HG A1C LEVEL LT 7.0%: CPT | Mod: CPTII,S$GLB,, | Performed by: ORTHOPAEDIC SURGERY

## 2023-08-15 PROCEDURE — 99999 PR PBB SHADOW E&M-EST. PATIENT-LVL III: ICD-10-PCS | Mod: PBBFAC,,, | Performed by: ORTHOPAEDIC SURGERY

## 2023-08-15 PROCEDURE — 1159F MED LIST DOCD IN RCRD: CPT | Mod: CPTII,S$GLB,, | Performed by: ORTHOPAEDIC SURGERY

## 2023-08-15 PROCEDURE — 3008F BODY MASS INDEX DOCD: CPT | Mod: CPTII,S$GLB,, | Performed by: ORTHOPAEDIC SURGERY

## 2023-08-15 PROCEDURE — 73564 XR KNEE ORTHO BILAT WITH FLEXION: ICD-10-PCS | Mod: 26,50,, | Performed by: RADIOLOGY

## 2023-08-15 PROCEDURE — 73564 X-RAY EXAM KNEE 4 OR MORE: CPT | Mod: TC,50

## 2023-08-15 NOTE — PROGRESS NOTES
CC: Left knee pain    38 y.o. Female with a acute onset of left knee pain after performing duck walk/deep flexion of her knees.  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 She has history of left knee ACL reconstruction in 2004 as a college freshman. She reports sharp stabbing pain over lateral joint line, subjective weakness and mechanical symptoms. Feels like it catches when she tries to fully extend. It also bothers her at night.    + mechanical symptoms, + instability    Is affecting ADLs.  Pain is 9/10 at it's worst.    REVIEW OF SYSTEMS:  Constitution: Negative. Negative for chills, fever and night sweats.   HENT: Negative for congestion and headaches.    Eyes: Negative for blurred vision, left vision loss and right vision loss.   Cardiovascular: Negative for chest pain and syncope.   Respiratory: Negative for cough and shortness of breath.    Endocrine: Negative for polydipsia, polyphagia and polyuria.   Hematologic/Lymphatic: Negative for bleeding problem. Does not bruise/bleed easily.   Skin: Negative for dry skin, itching and rash.   Musculoskeletal: Negative for falls. Positive for left knee pain and  muscle weakness.   Gastrointestinal: Negative for abdominal pain and bowel incontinence.   Genitourinary: Negative for bladder incontinence and nocturia.   Neurological: Negative for disturbances in coordination, loss of balance and seizures.   Psychiatric/Behavioral: Negative for depression. The patient does not have insomnia.    Allergic/Immunologic: Negative for hives and persistent infections.     PAST MEDICAL HISTORY:    Past Medical History:   Diagnosis Date    History of shingles     Migraine headache     Ulcer        PAST SURGICAL HISTORY:   Past Surgical History:   Procedure Laterality Date    CHOLECYSTECTOMY      KNEE SURGERY Left     SHOULDER SURGERY Right        FAMILY HISTORY:   Family History   Problem Relation Age  "of Onset    Migraines Mother     Skin cancer Mother     Cancer Mother         skin    Melanoma Mother     Hypertension Father     Diabetes Maternal Grandmother     Breast cancer Maternal Grandmother     Cancer Maternal Grandmother         breast    Brain cancer Paternal Grandfather     Cancer Paternal Grandfather         brain    Ovarian cancer Neg Hx     Colon cancer Neg Hx     Eclampsia Neg Hx     Miscarriages / Stillbirths Neg Hx      labor Neg Hx     Stroke Neg Hx        SOCIAL HISTORY:   Social History     Socioeconomic History    Marital status:     Number of children: 0   Occupational History    Occupation: ATC     Employer: [employer]   Tobacco Use    Smoking status: Never     Passive exposure: Never    Smokeless tobacco: Never   Substance and Sexual Activity    Alcohol use: Not Currently     Comment: 2 per month.    Drug use: Never    Sexual activity: Yes     Partners: Male       MEDICATIONS:     Current Outpatient Medications:     benzonatate (TESSALON) 100 MG capsule, Take 1 capsule (100 mg total) by mouth 3 (three) times daily as needed for Cough., Disp: 15 capsule, Rfl: 0    famotidine (PEPCID) 20 MG tablet, Take 1 tablet (20 mg total) by mouth 2 (two) times daily for 7 days, Disp: 14 tablet, Rfl: 0    sumatriptan (IMITREX) 100 MG tablet, Take 1 tablet (100 mg total) by mouth every 2 (two) hours as needed for Migraine (Do not take more than 200 mg in 24 hours)., Disp: 9 tablet, Rfl: 1    ALLERGIES:   Review of patient's allergies indicates:   Allergen Reactions    Codeine Anaphylaxis    Demerol [meperidine] Anaphylaxis    Morphine Anaphylaxis       VITAL SIGNS:   /68   Pulse 75   Ht 5' 6" (1.676 m)   Wt 79 kg (174 lb 2.6 oz)   BMI 28.11 kg/m²      PHYSICAL EXAMINATION  General:  The patient is alert and oriented x 3.  Mood is pleasant.  Observation of ears, eyes and nose reveal no gross abnormalities.  No labored breathing observed.    LEFT KNEE EXAMINATION "     OBSERVATION / INSPECTION   Gait:   Mildly antalgic   Alignment:  Neutral   Scars:   None   Muscle atrophy: Mild  Effusion:  1+  Warmth:  None   Discoloration:   none     TENDERNESS / CREPITUS (T / C):          T / C      T / C   Patella   - / -   Lateral joint line   + / +   Peripatellar medial  -  Medial joint line    + / -   Peripatellar lateral -  Medial plica   - / -   Patellar tendon -   Popliteal fossa   - / -   Quad tendon   -   Gastrocnemius   -   Prepatellar Bursa - / -   Quadricep   -   Tibial tubercle  -  Thigh/hamstring  -   Pes anserine/HS -  Fibula    -   ITB   - / -  Tibia     -   Tib/fib joint  - / -  LCL    -     MFC   - / -   MCL: Proximal  -    LFC   - / -    Distal   -          ROM: (* = pain)  PASSIVE   ACTIVE    Left :   0 / 0 / 135   0 / 0 / 135    Right :    5 / 0 / 145   5 / 0 / 145    Patellofemoral examination:  See above noted areas of tenderness.   Patella position    Subluxation / dislocation: Centered           Sup. / Inf;   Normal   Crepitus (PF):    Absent   Patellar Mobility:       Medial-lateral:   Normal    Superior-inferior:  Normal    Inferior tilt   Normal    Patellar tendon:  Normal   Lateral tilt:    Normal   J-sign:     None   Patellofemoral grind:   No pain       MENISCAL SIGNS:     Pain on terminal extension:  +  Pain on terminal flexion:  +  Gaels maneuver:  + (for LJL pain)  Squat     +    LIGAMENT EXAMINATION:  ACL / Lachman:  normal (-1 to 2mm)    PCL-Post.  drawer: normal 0 to 2mm  MCL- Valgus:  normal 0 to 2mm  LCL- Varus:  normal 0 to 2mm  Pivot shift: normal (Equal)   Dial Test: difference c/w other side   At 30° flexion: normal (< 5°)    At 90° flexion: normal (< 5°)   Reverse Pivot Shift:   normal (Equal)     STRENGTH: (* = with pain) PAINFUL SIDE   Quadricep   5/5   Hamstrin/5    EXTREMITY NEURO-VASCULAR EXAMINATION:   Sensation:  Grossly intact to light touch all dermatomal regions.   Motor Function:  Fully intact motor function at hip, knee,  foot and ankle    DTRs;  quadriceps and  achilles 2+.  No clonus and downgoing Babinski.    Vascular status:  DP and PT pulses 2+, brisk capillary refill, symmetric.       IMAGING:     X-rays including standing, weight bearing AP and flexion bilateral knees, lateral and merchant views ordered and images reviewed by me show:    No fracture, dislocation or other pathology. Post-surgical appearance from prior L knee ACL reconstruction   Medial compartment: no degenerative changes   Lateral compartment: no degenerative changes   Patellofemoral compartment: no degenerative changes       ASSESSMENT:    Left Knee  Pain, possible lateral meniscus tear  1. Internal derangement of left knee    2. Left knee pain, unspecified chronicity         PLAN:   1. MRI Left knee  2. F/U in clinic for results     All questions were answered, pt will contact us for questions or concerns in the interim.

## 2023-10-16 RX ORDER — SUMATRIPTAN SUCCINATE 100 MG/1
TABLET ORAL
Qty: 9 TABLET | Refills: 0 | Status: SHIPPED | OUTPATIENT
Start: 2023-10-16 | End: 2023-11-08 | Stop reason: SDUPTHER

## 2023-11-08 RX ORDER — SUMATRIPTAN SUCCINATE 100 MG/1
TABLET ORAL
Qty: 9 TABLET | Refills: 2 | Status: SHIPPED | OUTPATIENT
Start: 2023-11-08

## 2023-12-02 ENCOUNTER — OFFICE VISIT (OUTPATIENT)
Dept: URGENT CARE | Facility: CLINIC | Age: 38
End: 2023-12-02
Payer: COMMERCIAL

## 2023-12-02 VITALS
HEIGHT: 66 IN | BODY MASS INDEX: 27.97 KG/M2 | SYSTOLIC BLOOD PRESSURE: 111 MMHG | RESPIRATION RATE: 18 BRPM | TEMPERATURE: 99 F | DIASTOLIC BLOOD PRESSURE: 76 MMHG | OXYGEN SATURATION: 100 % | HEART RATE: 90 BPM | WEIGHT: 174 LBS

## 2023-12-02 DIAGNOSIS — J11.1 FLU: ICD-10-CM

## 2023-12-02 DIAGNOSIS — R05.9 COUGH, UNSPECIFIED TYPE: Primary | ICD-10-CM

## 2023-12-02 DIAGNOSIS — R09.81 SINUS CONGESTION: ICD-10-CM

## 2023-12-02 LAB
CTP QC/QA: YES
POC MOLECULAR INFLUENZA A AGN: POSITIVE
POC MOLECULAR INFLUENZA B AGN: NEGATIVE

## 2023-12-02 PROCEDURE — 87502 POCT INFLUENZA A/B MOLECULAR: ICD-10-PCS | Mod: QW,S$GLB,, | Performed by: PHYSICIAN ASSISTANT

## 2023-12-02 PROCEDURE — 87502 INFLUENZA DNA AMP PROBE: CPT | Mod: QW,S$GLB,, | Performed by: PHYSICIAN ASSISTANT

## 2023-12-02 PROCEDURE — 99213 OFFICE O/P EST LOW 20 MIN: CPT | Mod: S$GLB,,, | Performed by: PHYSICIAN ASSISTANT

## 2023-12-02 PROCEDURE — 99213 PR OFFICE/OUTPT VISIT, EST, LEVL III, 20-29 MIN: ICD-10-PCS | Mod: S$GLB,,, | Performed by: PHYSICIAN ASSISTANT

## 2023-12-02 RX ORDER — ALBUTEROL SULFATE 90 UG/1
2 AEROSOL, METERED RESPIRATORY (INHALATION) EVERY 6 HOURS PRN
Qty: 18 G | Refills: 0 | Status: SHIPPED | OUTPATIENT
Start: 2023-12-02 | End: 2024-12-01

## 2023-12-02 RX ORDER — GUAIFENESIN/DEXTROMETHORPHAN 100-10MG/5
5 SYRUP ORAL EVERY 4 HOURS PRN
Qty: 236 ML | Refills: 0 | Status: SHIPPED | OUTPATIENT
Start: 2023-12-02 | End: 2023-12-12

## 2023-12-02 RX ORDER — VITAMIN A 3000 MCG
1 CAPSULE ORAL
Qty: 60 ML | Refills: 12 | Status: SHIPPED | OUTPATIENT
Start: 2023-12-02

## 2023-12-02 RX ORDER — OSELTAMIVIR PHOSPHATE 75 MG/1
75 CAPSULE ORAL 2 TIMES DAILY
Qty: 10 CAPSULE | Refills: 0 | Status: SHIPPED | OUTPATIENT
Start: 2023-12-02 | End: 2023-12-07

## 2023-12-02 RX ORDER — FLUTICASONE PROPIONATE 50 MCG
1 SPRAY, SUSPENSION (ML) NASAL DAILY
Qty: 16 G | Refills: 3 | Status: SHIPPED | OUTPATIENT
Start: 2023-12-02

## 2023-12-02 RX ORDER — PROMETHAZINE HYDROCHLORIDE AND DEXTROMETHORPHAN HYDROBROMIDE 6.25; 15 MG/5ML; MG/5ML
5 SYRUP ORAL EVERY 8 HOURS PRN
Qty: 118 ML | Refills: 0 | Status: SHIPPED | OUTPATIENT
Start: 2023-12-02 | End: 2023-12-12

## 2023-12-02 RX ORDER — AZELASTINE 1 MG/ML
1 SPRAY, METERED NASAL 2 TIMES DAILY
Qty: 30 ML | Refills: 0 | Status: SHIPPED | OUTPATIENT
Start: 2023-12-02 | End: 2024-12-01

## 2023-12-02 NOTE — PROGRESS NOTES
"Subjective:      Patient ID: Christie Bay is a 38 y.o. female.    Vitals:  height is 5' 6" (1.676 m) and weight is 78.9 kg (174 lb). Her temperature is 98.6 °F (37 °C). Her blood pressure is 111/76 and her pulse is 90. Her respiration is 18 and oxygen saturation is 100%.     Chief Complaint: Cough    PATIENT COMPLAINS OF BODY ACHES FEVER FOR THE LAST 2 DAYS.  ALSO COMPLAINS OF FATIGUE SINUS CONGESTION.    Cough  This is a new problem. The current episode started in the past 7 days (2 days). The problem has been gradually worsening. The problem occurs constantly. The cough is Productive of sputum. Associated symptoms include ear congestion, headaches, nasal congestion, postnasal drip and wheezing. Pertinent negatives include no chills, ear pain, fever, sore throat or sweats. Nothing aggravates the symptoms. She has tried OTC cough suppressant (decongestant , excedrin) for the symptoms. The treatment provided mild relief. There is no history of asthma, bronchitis or COPD.       Constitution: Negative for chills and fever.   HENT:  Positive for postnasal drip. Negative for ear pain and sore throat.    Respiratory:  Positive for cough and wheezing.    Skin:  Negative for erythema.   Neurological:  Positive for headaches.      Objective:     Physical Exam   Constitutional: She is oriented to person, place, and time. She appears well-developed. She is cooperative.  Non-toxic appearance. She does not appear ill. No distress.   HENT:   Head: Normocephalic and atraumatic.   Ears:   Right Ear: Hearing, tympanic membrane, external ear and ear canal normal.   Left Ear: Hearing, tympanic membrane, external ear and ear canal normal.   Nose: Rhinorrhea and congestion present. No mucosal edema or nasal deformity. No epistaxis. Right sinus exhibits no maxillary sinus tenderness and no frontal sinus tenderness. Left sinus exhibits no maxillary sinus tenderness and no frontal sinus tenderness.   Mouth/Throat: Uvula " is midline and mucous membranes are normal. No trismus in the jaw. Normal dentition. No uvula swelling. Posterior oropharyngeal erythema present. No oropharyngeal exudate or posterior oropharyngeal edema.   Eyes: Conjunctivae, EOM and lids are normal. Pupils are equal, round, and reactive to light. Right eye exhibits no discharge. Left eye exhibits no discharge. No scleral icterus.   Neck: Trachea normal and phonation normal. Neck supple. No JVD present. No tracheal deviation present. No thyromegaly present. No edema present. No erythema present. No neck rigidity present.   Cardiovascular: Normal rate, regular rhythm, normal heart sounds and normal pulses.   No murmur heard.Exam reveals no gallop and no friction rub.   Pulmonary/Chest: Effort normal and breath sounds normal. No stridor. No respiratory distress. She has no decreased breath sounds. She has no wheezes. She has no rhonchi. She has no rales. She exhibits no tenderness.   Abdominal: Normal appearance. She exhibits no distension. Soft. There is no abdominal tenderness. There is no rebound and no guarding.   Musculoskeletal: Normal range of motion.         General: No deformity. Normal range of motion.   Neurological: She is alert and oriented to person, place, and time. She exhibits normal muscle tone. Coordination normal.   Skin: Skin is warm, dry, intact, not diaphoretic, not pale and no rash. Capillary refill takes less than 2 seconds. No erythema   Psychiatric: Her speech is normal and behavior is normal. Judgment and thought content normal.   Nursing note and vitals reviewed.    Results for orders placed or performed in visit on 12/02/23   POCT Influenza A/B MOLECULAR   Result Value Ref Range    POC Molecular Influenza A Ag Positive (A) Negative, Not Reported    POC Molecular Influenza B Ag Negative Negative, Not Reported     Acceptable Yes     No results found.     Assessment:     1. Cough, unspecified type    2. Flu    3. Sinus  congestion        Plan:       Cough, unspecified type  -     POCT Influenza A/B MOLECULAR  -     albuterol (VENTOLIN HFA) 90 mcg/actuation inhaler; Inhale 2 puffs into the lungs every 6 (six) hours as needed for Wheezing. Rescue  Dispense: 18 g; Refill: 0  -     fluticasone propionate (FLONASE) 50 mcg/actuation nasal spray; 1 spray (50 mcg total) by Each Nostril route once daily.  Dispense: 16 g; Refill: 3  -     dextromethorphan-guaiFENesin  mg/5 ml (ROBITUSSIN-DM)  mg/5 mL liquid; Take 5 mLs by mouth every 4 (four) hours as needed (COUGH).  Dispense: 236 mL; Refill: 0  -     promethazine-dextromethorphan (PROMETHAZINE-DM) 6.25-15 mg/5 mL Syrp; Take 5 mLs by mouth every 8 (eight) hours as needed (COUGH).  Dispense: 118 mL; Refill: 0    Flu  -     oseltamivir (TAMIFLU) 75 MG capsule; Take 1 capsule (75 mg total) by mouth 2 (two) times daily. for 5 days  Dispense: 10 capsule; Refill: 0    Sinus congestion  -     fluticasone propionate (FLONASE) 50 mcg/actuation nasal spray; 1 spray (50 mcg total) by Each Nostril route once daily.  Dispense: 16 g; Refill: 3  -     azelastine (ASTELIN) 137 mcg (0.1 %) nasal spray; 1 spray (137 mcg total) by Nasal route 2 (two) times daily.  Dispense: 30 mL; Refill: 0  -     sodium chloride (SALINE NASAL) 0.65 % nasal spray; 1 spray by Nasal route as needed for Congestion.  Dispense: 60 mL; Refill: 12

## 2023-12-02 NOTE — LETTER
December 2, 2023      [facility]  [address], [address]  [place] [address]  Phone: [phone]  Fax: [phone]       Patient: Christie Bay   YOB: 1985  Date of Visit: 12/02/2023    To Whom It May Concern:    Bandar Bay  was at [facility] on 12/02/2023. The patient may return to work/school on 12/05/2023 WITH NO restrictions. If you have any questions or concerns, or if I can be of further assistance, please do not hesitate to contact me.    Sincerely,    Allan Isaacs PA

## 2023-12-21 ENCOUNTER — OFFICE VISIT (OUTPATIENT)
Dept: URGENT CARE | Facility: CLINIC | Age: 38
End: 2023-12-21
Payer: COMMERCIAL

## 2023-12-21 VITALS
RESPIRATION RATE: 20 BRPM | WEIGHT: 174 LBS | SYSTOLIC BLOOD PRESSURE: 110 MMHG | DIASTOLIC BLOOD PRESSURE: 75 MMHG | HEIGHT: 66 IN | OXYGEN SATURATION: 98 % | BODY MASS INDEX: 27.97 KG/M2 | TEMPERATURE: 98 F

## 2023-12-21 DIAGNOSIS — H65.02 NON-RECURRENT ACUTE SEROUS OTITIS MEDIA OF LEFT EAR: Primary | ICD-10-CM

## 2023-12-21 DIAGNOSIS — H92.09 OTALGIA, UNSPECIFIED LATERALITY: ICD-10-CM

## 2023-12-21 PROBLEM — M25.539 PAIN IN WRIST: Status: ACTIVE | Noted: 2023-12-21

## 2023-12-21 LAB
CTP QC/QA: YES
SARS-COV-2 AG RESP QL IA.RAPID: NEGATIVE

## 2023-12-21 PROCEDURE — 99213 OFFICE O/P EST LOW 20 MIN: CPT | Mod: S$GLB,,, | Performed by: FAMILY MEDICINE

## 2023-12-21 PROCEDURE — 87811 SARS-COV-2 COVID19 W/OPTIC: CPT | Mod: QW,S$GLB,, | Performed by: FAMILY MEDICINE

## 2023-12-21 PROCEDURE — 99213 PR OFFICE/OUTPT VISIT, EST, LEVL III, 20-29 MIN: ICD-10-PCS | Mod: S$GLB,,, | Performed by: FAMILY MEDICINE

## 2023-12-21 PROCEDURE — 87811 SARS CORONAVIRUS 2 ANTIGEN POCT, MANUAL READ: ICD-10-PCS | Mod: QW,S$GLB,, | Performed by: FAMILY MEDICINE

## 2023-12-21 RX ORDER — AMOXICILLIN AND CLAVULANATE POTASSIUM 875; 125 MG/1; MG/1
1 TABLET, FILM COATED ORAL 2 TIMES DAILY
Qty: 20 TABLET | Refills: 0 | Status: SHIPPED | OUTPATIENT
Start: 2023-12-21 | End: 2023-12-21

## 2023-12-21 RX ORDER — AMOXICILLIN AND CLAVULANATE POTASSIUM 875; 125 MG/1; MG/1
1 TABLET, FILM COATED ORAL 2 TIMES DAILY
Qty: 20 TABLET | Refills: 0 | Status: SHIPPED | OUTPATIENT
Start: 2023-12-21 | End: 2023-12-31

## 2024-04-23 ENCOUNTER — OFFICE VISIT (OUTPATIENT)
Dept: URGENT CARE | Facility: CLINIC | Age: 39
End: 2024-04-23
Payer: COMMERCIAL

## 2024-04-23 VITALS
HEART RATE: 79 BPM | RESPIRATION RATE: 17 BRPM | HEIGHT: 66 IN | DIASTOLIC BLOOD PRESSURE: 72 MMHG | SYSTOLIC BLOOD PRESSURE: 105 MMHG | BODY MASS INDEX: 27.32 KG/M2 | WEIGHT: 170 LBS | TEMPERATURE: 98 F | OXYGEN SATURATION: 98 %

## 2024-04-23 DIAGNOSIS — J02.9 SORE THROAT: Primary | ICD-10-CM

## 2024-04-23 LAB
CTP QC/QA: YES
CTP QC/QA: YES
MOLECULAR STREP A: NEGATIVE
SARS-COV-2 AG RESP QL IA.RAPID: NEGATIVE

## 2024-04-23 PROCEDURE — 87651 STREP A DNA AMP PROBE: CPT | Mod: QW,S$GLB,, | Performed by: FAMILY MEDICINE

## 2024-04-23 PROCEDURE — 99213 OFFICE O/P EST LOW 20 MIN: CPT | Mod: S$GLB,,, | Performed by: FAMILY MEDICINE

## 2024-04-23 PROCEDURE — 87811 SARS-COV-2 COVID19 W/OPTIC: CPT | Mod: QW,S$GLB,, | Performed by: FAMILY MEDICINE

## 2024-04-23 NOTE — PROGRESS NOTES
"Subjective:      Patient ID: Christie Bay is a 39 y.o. female.    Vitals:  height is 5' 6" (1.676 m) and weight is 77.1 kg (170 lb). Her temperature is 98.4 °F (36.9 °C). Her blood pressure is 105/72 and her pulse is 79. Her respiration is 17 and oxygen saturation is 98%.     Chief Complaint: Sore Throat    This is a 39 y.o. female who presents today with a chief complaint of   Sore throat, congestion, and ear pain. Symptoms started yesterday. Taking Nyquil and daquil    Sore Throat   This is a new problem. The current episode started yesterday. The problem has been gradually worsening. Neither side of throat is experiencing more pain than the other. There has been no fever. The pain is at a severity of 5/10. The pain is moderate. Associated symptoms include congestion and ear pain. She has tried acetaminophen (nyquil, daquil) for the symptoms. The treatment provided moderate relief.       HENT:  Positive for ear pain, congestion and sore throat.       Objective:     Physical Exam   Constitutional: She does not appear ill. No distress. normal  HENT:   Head: Normocephalic and atraumatic.   Nose: Congestion present.   Mouth/Throat: Mucous membranes are moist. Posterior oropharyngeal erythema: cobblestoning.   Eyes: Pupils are equal, round, and reactive to light. Extraocular movement intact   Neck: Neck supple.   Cardiovascular: Normal rate, regular rhythm, normal heart sounds and normal pulses.   Pulmonary/Chest: Effort normal and breath sounds normal.   Abdominal: Normal appearance.   Neurological: She is alert.   Nursing note and vitals reviewed.      Assessment:     1. Sore throat        Plan:       Sore throat  -     SARS Coronavirus 2 Antigen, POCT Manual Read  -     POCT Strep A, Molecular  -     diphenhydrAMINE-aluminum-magnesium hydroxide-simethicone-LIDOcaine viscous HCl 2%; Swish and spit 15 mLs every 4 (four) hours as needed (sore throat).  Dispense: 120 each; Refill: 0    Recommended " Muccinex-D OTC. RTC prn worsening symptoms

## 2024-08-27 ENCOUNTER — TELEPHONE (OUTPATIENT)
Dept: HEMATOLOGY/ONCOLOGY | Facility: CLINIC | Age: 39
End: 2024-08-27
Payer: COMMERCIAL

## 2024-08-27 ENCOUNTER — OFFICE VISIT (OUTPATIENT)
Dept: PRIMARY CARE CLINIC | Facility: CLINIC | Age: 39
End: 2024-08-27
Payer: COMMERCIAL

## 2024-08-27 VITALS
BODY MASS INDEX: 27.74 KG/M2 | HEIGHT: 66 IN | OXYGEN SATURATION: 99 % | RESPIRATION RATE: 18 BRPM | HEART RATE: 71 BPM | SYSTOLIC BLOOD PRESSURE: 118 MMHG | WEIGHT: 172.63 LBS | DIASTOLIC BLOOD PRESSURE: 72 MMHG

## 2024-08-27 DIAGNOSIS — K21.9 GASTROESOPHAGEAL REFLUX DISEASE, UNSPECIFIED WHETHER ESOPHAGITIS PRESENT: ICD-10-CM

## 2024-08-27 DIAGNOSIS — Z00.00 PHYSICAL EXAM: Primary | ICD-10-CM

## 2024-08-27 DIAGNOSIS — Z80.9 FAMILY HISTORY OF CANCER: ICD-10-CM

## 2024-08-27 PROCEDURE — 99499 UNLISTED E&M SERVICE: CPT | Mod: S$GLB,,, | Performed by: NURSE PRACTITIONER

## 2024-08-27 NOTE — TELEPHONE ENCOUNTER
Called and spoke to patient to confirm appointment on 8/28/24 and to complete the genetic questionnaire.

## 2024-08-27 NOTE — PROGRESS NOTES
Hereditary/High Risk Clinic  Department of Hematology and Oncology  [facility]    Cancer Genetics  Initial Consultation    Date of Service:  24  Visit Provider:  Karina Uriostegui PA-C  Collaborating Physician:  Gillian Dozier MD    Patient:  Christie Bay  :  1985  MRN:   [id number]     Referring Provider    Eliane Paredes, NP  [address] [place] [address]    Reason For Consult: Hereditary cancer risk assessment    Approximately 25 minutes were spent face-to-face with the patient.  Approximately 45 minutes in total were spent on this encounter, which includes face-to-face time and non-face-to-face time preparing to see the patient (e.g., review of tests), obtaining and/or reviewing separately obtained history, documenting clinical information in the electronic or other health record, independently interpreting results (not separately reported) and communicating results to the patient/family/caregiver, or care coordination (not separately reported).     ASSESSMENT   Christie Bay was referred to Cancer Genetics due to her family history of cancer.     Focused personal history:   Germline cancer-genetic testing: no  Personal history of cancer:  no  Benign tumors:  no  Pancreatitis:  no    Pertinent family history:   MGM: Kidney cancer 50,  74  Mother: Skin cancer 35 (unspecified type on nose x 1), living 58   PGF: Brain cancer 60,  63  PGM: MDS 64,  74    Impression:   Based on the information provided, Christie's personal and/or family history is not suggestive of a hereditary cancer syndrome and does not meet current clinical guidelines for genetic testing. Christie has three relatives who are not related to each other who had cancers that are typically not hereditary. This suggests that they had sporadic cancers related to age and other risk factors (exposures to chemicals, heavy metals, etc).     PLAN     1. Encounter for  "nonprocreative genetic counseling    2. Family history of brain cancer    3. Family history of kidney cancer    4. Family history of cancer    - Germline genetic testing is not indicated.   - Cancer risk-reduction strategies  - Follow up for further discussion if indicated or patient desires.  - Average risk breast cancer screening: Annual clinical breast exam, breast self-exams, annual 3-D screening mammogram beginning at age 40.   - Average risk colon cancer screening: Colonoscopy at age 40.    Questions were encouraged and answered to the patient's satisfaction, and she verbalized understanding of information and agreement with the plan.       SUBJECTIVE   HPI:  Christie Bay is a 39 y.o. assigned female at birth who is new to the [facility] of [facility] and to me.  She presents for a hereditary cancer risk assessment.      Cancer Screening:   Colonoscopy: None  Well woman exam: 8/2020  Mammogram: None  Breast MRI: None    Breast-Specific History  Age: 39 years  Height:  5' 6"  Weight:  172 pounds  Breast density per BI-RADS:  Unknown, no prior imaging  Age at menarche: 14 years  Age at first live birth:  35 years  Uterus and ovaries intact: Yes  Menopause:  premenopausal  HRT usage:  never  BRCA testing:  No prior testing   Personal history of ovarian cancer:  No  Personal history of breast biopsy:  no  Ashkenazi Anabaptism inheritance:  No  History of XRT to chest wall:  No  Family history:  as detailed in pedigree/family history    Breast Cancer Risk Stratification   Estimated Breast Cancer Risk Model Used Patient's Score Risk for general population Patient's Risk Category   5-year Angelika Model 0.6% 0.5%  [x] Average risk (<1.7%)   [] Increased risk (?1.7%)   10-year Tyrer-Cuzick v8.0b 1.64%   [x] <5%   [] ?5%    Lifetime (to age 85) Tyrer-Cuzick v8.0b 13.24% 11.73%  [x] Average risk (<15%)   [] Intermediate risk (?15% - <20%)   [] High risk (?20%)     Recommendations for " individuals at average risk:   Breast self-exams beginning at age 25. Promptly report any changes to your provider.   Annual clinical breast exam beginning at age 25.   Annual 3-D screening mammogram beginning at age 40.   Breast cancer risk reduction:   Limit alcohol to one drink per day. Alcoholic beverages increase your risk for breast cancer.   Increase your physical activity. Exercise reduces the risk for breast and other cancers.    Don't smoke.   Maintain a healthy weight. Evidence suggests that maintaining a body mass index (BMI) of 20 - 25 helps to reduce breast cancer risk.  The use of combined estrogen/progesterone therapy can increase the risk for breast cancer when used for 3 to 5+ years. If you are taking this, please discuss with the prescribing provider.     Cancer risk reduction strategies  Most cancers are caused by a combination of aging, environmental exposures (chemicals, radiation), lifestyle factors (alcohol, smoking, obesity, sedentary lifestyle, poor diet), and medical conditions that cause infection or chronic inflammation in the body (diabetes, fatty liver disease, and viruses like HPV).     Don't smoke. Avoid second-hand smoke.   Limit alcohol consumption.   Exercise at least 150 minutes per week.   Maintain a healthy weight.   Eat a healthy diet.   Plant-based diets like the Mediterranean diet have been proven to reduce the risk for cancer and other medical conditions and help you live a longer, healthier life.   Eat more vegetables, fruits, nuts, beans, and whole grains. Healthy proteins include eggs, poultry, and seafood.   Limit red meat to no more than three portions per week.   Avoid processed foods. These foods can contain artificial preservatives and may be high in salt, fat, and sugar. This includes sugary drinks, processed meats (deli meat, hot dogs, sausages), frozen pizza/meals, packaged baked goods (cookies, crackers, chips), most breakfast cereals, canned/instant soup, boxed  instant pasta products, and sweetened yogurt.   Take steps to control or resolve any chronic medical conditions. For example, diabetes and viral infections (HPV, Brandon-Barr/mononucleosis, hepatitis B and C) are risk factors for cancer.   Protect against sexually transmitted infections.   Avoid UV exposure from the sun and tanning beds.   Wear all recommended protective gear in the workplace and report any safety concerns.   Engage in all recommended cancer screenings.     Social History     Tobacco Use    Smoking status: Never     Passive exposure: Never    Smokeless tobacco: Never   Substance Use Topics    Alcohol use: Not Currently     Comment: 2 per month.     Family History  Family history of genetic testing: No  Consanguinity: No        A larger copy of the pedigree is available in Media.     Past Medical History:   Diagnosis Date    History of shingles     Migraine headache     Ulcer      Patient Active Problem List   Diagnosis    Pain in wrist     Review of Systems  See HPI.      OBJECTIVE   Physical Exam  Very pleasant patient.  Unaccompanied  Vitals signs:  There were no vitals filed for this visit.   Constitutional: No apparent distress.   Pulmonary: Normal effort  Neurological: Alert and oriented. No obvious neurological deficits.   Psychiatric: Normal mood, affect, thought content, speech, behavior, judgment.    COUNSELING     CANCER GENETICS    Cancer is caused by an accumulation of genetic mutations that allow cells to grow and divide uncontrollably to form a tumor. Individuals with certain risk factors are more likely to develop genetic mutations that lead to cancer.      Age: Advancing age is the most important risk factor for cancer overall and for many individual cancer types.    Alcohol: Drinking alcohol can increase your risk of cancer of the mouth, throat, esophagus, larynx, liver, and breast.    Smoking: Tobacco contains benzene and other chemicals and can cause cancer of the lung, larynx,  mouth, esophagus, throat, bladder, kidney, liver, stomach, pancreas, colon and rectum, and cervix, as well as acute myeloid leukemia.    Chemicals: Asbestos, benzene, vinyl chloride, pesticides, fertilizer, wood dust, radon, aflatoxin (a food contaminant), arsenic (a drinking water contaminant), etc.  Radiation: Ultraviolet (sun, tanning beds) and ionizing radiation (radiation therapy, xrays, CT scans).  Chronic inflammation: Chronic infections (Brandon-Barr virus, HPV, hepatitis B and C, H. Pylori), abnormal immune reactions, and conditions like obesity and inflammatory bowel disease can cause DNA damage and cancer.   Hormones: Combined hormone therapy (estrogen and progestin) increases the risk for breast cancer. Hormone therapy with estrogen alone increases the risk for endometrial cancer. Diethylstilbestrol (DELFINA) is a form of estrogen given to some pregnant women from 9256-2954 to prevent premature labor, miscarriages, and other problems. Women who took DELFINA have an increased risk for breast cancers and their daughters have an increased risk for vaginal and cervical cancer.   Medical conditions: Fatty liver disease, cirrhosis, type 2 diabetes, metabolic syndrome (obesity, high blood pressure, high cholesterol, insuline resistance).   Immunosuppression: Due to condition (HIV/AIDS) or taking immunosuppressive medications (after organ transplant, etc).     While most individuals have a family history of cancer, only 5-10% of cancers are hereditary, meaning they are caused by inherited genetic mutations. The remaining cancers are sporadic, meaning they are caused by mutations acquired during the individual's life as a result of aging, environmental exposures, and other causes.     Sporadic cancer (75-80%): Sporadic cancers are random occurrences caused by an accumulation of genetic mutations acquired during the individual's lifetime.   Hereditary cancer (5-10%): Hereditary cancers are caused by a combination of  acquired mutations and an inherited mutation in a single gene. A family with a hereditary cancer syndrome will typically have individuals in every generation with the cancers caused by that gene, often diagnosed 10-20 years younger than usual.   Familial cancer (20%): Familial cancer refers to a clustering a cancer in a family that may be more than you would expect to see by chance, but they do not have an inherited mutation in a single gene that caused the cancers. Instead, their cancers are caused by a combination of shared genetic, environmental, and lifestyle factors.      REFERENCES   National Comprehensive Cancer Network (NCCN). (2024). Genetic/familial high-risk assessment: Breast, ovarian, and pancreatic. NCCN Clinical Practice Guidelines in Oncology (NCCN Guidelines), Version 1.2022.  National Comprehensive Cancer Network (NCCN). (2024). Kidney cancer. NCCN Clinical Practice Guidelines in Oncology (NCCN Guidelines), Version 3.2024.  National Comprehensive Cancer Network (NCCN). (2024). Myelodysplastic syndromes. NCCN Clinical Practice Guidelines in Oncology (NCCN Guidelines), Version 3.2024.      JULIANE Dominique, PA-C  Physician Assistant, Hereditary/High Risk Clinic  Hematology/Oncology, [facility]

## 2024-08-27 NOTE — PROGRESS NOTES
Subjective:       Patient ID: Christie Bay is a 39 y.o. female.    Chief Complaint: No chief complaint on file.    Patient who is new to me presents for physical exam. She reports family history of cancer. Her maternal grandmother had breast cancer, mother had skin cancer, and paternal grandfather had brain cancer. She never smoked and drinks alcohol maybe once a month. She is  with 2 children (4 years and 11 months). Eats home cooked meals, mainly chicken, pasta, and vegetables. She typically runs 4x per week about 30 minutes. She reports good sleep. She reports sore throat X 2 weeks after a viral illness. She feels a sensation of a lump in her throat that requires her to swallow to clear it. She reports no nasal congestion, cough, or hoarse voice.      Sore Throat   This is a recurrent problem. The current episode started 1 to 4 weeks ago. The problem has been unchanged. Neither side of throat is experiencing more pain than the other. Associated symptoms include congestion and headaches. Pertinent negatives include no abdominal pain, coughing, diarrhea, hoarse voice, neck pain, shortness of breath, trouble swallowing or vomiting.     Review of Systems   Constitutional:  Negative for chills, fatigue and fever.   HENT:  Positive for congestion and sore throat. Negative for hoarse voice, sinus pressure, sinus pain, sneezing and trouble swallowing.    Respiratory:  Negative for cough, chest tightness, shortness of breath and wheezing.    Cardiovascular:  Negative for chest pain, palpitations and leg swelling.   Gastrointestinal:  Negative for abdominal distention, abdominal pain, constipation, diarrhea, nausea and vomiting.   Genitourinary:  Negative for decreased urine volume, difficulty urinating, dysuria, frequency and urgency.   Musculoskeletal:  Negative for arthralgias, gait problem, joint swelling, myalgias and neck pain.   Skin:  Negative for rash and wound.   Neurological:  Positive  for headaches. Negative for dizziness, light-headedness and numbness.       Objective:      Physical Exam  Vitals and nursing note reviewed.   Constitutional:       Appearance: She is well-developed.   HENT:      Head: Normocephalic and atraumatic.      Right Ear: External ear normal.      Left Ear: External ear normal.      Nose: Nose normal.   Eyes:      Pupils: Pupils are equal, round, and reactive to light.   Cardiovascular:      Rate and Rhythm: Normal rate and regular rhythm.      Heart sounds: Normal heart sounds.   Pulmonary:      Effort: Pulmonary effort is normal.      Breath sounds: Normal breath sounds.   Abdominal:      General: Bowel sounds are normal.      Palpations: Abdomen is soft.   Musculoskeletal:         General: Normal range of motion.      Cervical back: Normal range of motion.   Skin:     General: Skin is warm and dry.   Neurological:      Mental Status: She is alert and oriented to person, place, and time.         Assessment:       1. Physical exam    2. Family history of cancer        Plan:       Diagnoses and all orders for this visit:    Physical exam  -     Lipid Panel; Future  -     TSH; Future  -     CBC Auto Differential; Future  -     Hemoglobin A1C; Future  -     Comprehensive Metabolic Panel; Future  -     H. PYLORI ANTIBODY, IGG; Future    Family history of cancer  -     Ambulatory referral/consult to Genetics; Future      Will follow up on labs. Trial prilosec (reports she has some at home) for 2 weeks and use flonase.   Discussed lifestyle interventions for GERD. Follow up in 4 weeks. Discussed worsening signs/symptoms and when to return to clinic or go to ED.   Patient expresses understanding and agrees with treatment plan.

## 2024-08-28 ENCOUNTER — LAB VISIT (OUTPATIENT)
Dept: LAB | Facility: HOSPITAL | Age: 39
End: 2024-08-28
Payer: COMMERCIAL

## 2024-08-28 ENCOUNTER — OFFICE VISIT (OUTPATIENT)
Dept: HEMATOLOGY/ONCOLOGY | Facility: CLINIC | Age: 39
End: 2024-08-28
Payer: COMMERCIAL

## 2024-08-28 DIAGNOSIS — Z80.51 FAMILY HISTORY OF KIDNEY CANCER: ICD-10-CM

## 2024-08-28 DIAGNOSIS — Z00.00 PHYSICAL EXAM: ICD-10-CM

## 2024-08-28 DIAGNOSIS — Z80.9 FAMILY HISTORY OF CANCER: ICD-10-CM

## 2024-08-28 DIAGNOSIS — Z80.8 FAMILY HISTORY OF BRAIN CANCER: ICD-10-CM

## 2024-08-28 DIAGNOSIS — K21.9 GASTROESOPHAGEAL REFLUX DISEASE, UNSPECIFIED WHETHER ESOPHAGITIS PRESENT: ICD-10-CM

## 2024-08-28 DIAGNOSIS — Z71.83 ENCOUNTER FOR NONPROCREATIVE GENETIC COUNSELING: Primary | ICD-10-CM

## 2024-08-28 LAB
ALBUMIN SERPL BCP-MCNC: 3.8 G/DL (ref 3.5–5.2)
ALP SERPL-CCNC: 80 U/L (ref 55–135)
ALT SERPL W/O P-5'-P-CCNC: 19 U/L (ref 10–44)
ANION GAP SERPL CALC-SCNC: 5 MMOL/L (ref 8–16)
AST SERPL-CCNC: 22 U/L (ref 10–40)
BASOPHILS # BLD AUTO: 0.02 K/UL (ref 0–0.2)
BASOPHILS NFR BLD: 0.4 % (ref 0–1.9)
BILIRUB SERPL-MCNC: 0.9 MG/DL (ref 0.1–1)
BUN SERPL-MCNC: 8 MG/DL (ref 6–20)
CALCIUM SERPL-MCNC: 9.2 MG/DL (ref 8.7–10.5)
CHLORIDE SERPL-SCNC: 105 MMOL/L (ref 95–110)
CHOLEST SERPL-MCNC: 190 MG/DL (ref 120–199)
CHOLEST/HDLC SERPL: 3.4 {RATIO} (ref 2–5)
CO2 SERPL-SCNC: 27 MMOL/L (ref 23–29)
CREAT SERPL-MCNC: 0.8 MG/DL (ref 0.5–1.4)
DIFFERENTIAL METHOD BLD: ABNORMAL
EOSINOPHIL # BLD AUTO: 0.1 K/UL (ref 0–0.5)
EOSINOPHIL NFR BLD: 1.2 % (ref 0–8)
ERYTHROCYTE [DISTWIDTH] IN BLOOD BY AUTOMATED COUNT: 11.8 % (ref 11.5–14.5)
EST. GFR  (NO RACE VARIABLE): >60 ML/MIN/1.73 M^2
ESTIMATED AVG GLUCOSE: 105 MG/DL (ref 68–131)
GLUCOSE SERPL-MCNC: 92 MG/DL (ref 70–110)
H PYLORI IGG SERPL QL IA: NEGATIVE
HBA1C MFR BLD: 5.3 % (ref 4–5.6)
HCT VFR BLD AUTO: 39.1 % (ref 37–48.5)
HDLC SERPL-MCNC: 56 MG/DL (ref 40–75)
HDLC SERPL: 29.5 % (ref 20–50)
HGB BLD-MCNC: 13.1 G/DL (ref 12–16)
IMM GRANULOCYTES # BLD AUTO: 0.01 K/UL (ref 0–0.04)
IMM GRANULOCYTES NFR BLD AUTO: 0.2 % (ref 0–0.5)
LDLC SERPL CALC-MCNC: 121.8 MG/DL (ref 63–159)
LYMPHOCYTES # BLD AUTO: 1.3 K/UL (ref 1–4.8)
LYMPHOCYTES NFR BLD: 24.4 % (ref 18–48)
MCH RBC QN AUTO: 31.2 PG (ref 27–31)
MCHC RBC AUTO-ENTMCNC: 33.5 G/DL (ref 32–36)
MCV RBC AUTO: 93 FL (ref 82–98)
MONOCYTES # BLD AUTO: 0.5 K/UL (ref 0.3–1)
MONOCYTES NFR BLD: 9.6 % (ref 4–15)
NEUTROPHILS # BLD AUTO: 3.3 K/UL (ref 1.8–7.7)
NEUTROPHILS NFR BLD: 64.2 % (ref 38–73)
NONHDLC SERPL-MCNC: 134 MG/DL
NRBC BLD-RTO: 0 /100 WBC
PLATELET # BLD AUTO: 194 K/UL (ref 150–450)
PMV BLD AUTO: 10.6 FL (ref 9.2–12.9)
POTASSIUM SERPL-SCNC: 4.7 MMOL/L (ref 3.5–5.1)
PROT SERPL-MCNC: 6.9 G/DL (ref 6–8.4)
RBC # BLD AUTO: 4.2 M/UL (ref 4–5.4)
SODIUM SERPL-SCNC: 137 MMOL/L (ref 136–145)
TRIGL SERPL-MCNC: 61 MG/DL (ref 30–150)
TSH SERPL DL<=0.005 MIU/L-ACNC: 0.6 UIU/ML (ref 0.4–4)
WBC # BLD AUTO: 5.2 K/UL (ref 3.9–12.7)

## 2024-08-28 PROCEDURE — 80053 COMPREHEN METABOLIC PANEL: CPT | Performed by: NURSE PRACTITIONER

## 2024-08-28 PROCEDURE — 85025 COMPLETE CBC W/AUTO DIFF WBC: CPT | Performed by: NURSE PRACTITIONER

## 2024-08-28 PROCEDURE — 83036 HEMOGLOBIN GLYCOSYLATED A1C: CPT | Performed by: NURSE PRACTITIONER

## 2024-08-28 PROCEDURE — 36415 COLL VENOUS BLD VENIPUNCTURE: CPT | Performed by: NURSE PRACTITIONER

## 2024-08-28 PROCEDURE — 86677 HELICOBACTER PYLORI ANTIBODY: CPT | Performed by: NURSE PRACTITIONER

## 2024-08-28 PROCEDURE — 84443 ASSAY THYROID STIM HORMONE: CPT | Performed by: NURSE PRACTITIONER

## 2024-08-28 PROCEDURE — 99999 PR PBB SHADOW E&M-EST. PATIENT-LVL II: CPT | Mod: PBBFAC,,, | Performed by: PHYSICIAN ASSISTANT

## 2024-08-28 PROCEDURE — 80061 LIPID PANEL: CPT | Performed by: NURSE PRACTITIONER

## 2024-08-28 NOTE — LETTER
Breast Cancer Risk Stratification   Estimated Breast Cancer Risk Model Used Patient's Score Risk for general population Patient's Risk Category   5-year Angelika Model 0.6% 0.5%  [x] Average risk (<1.7%)   [] Increased risk (?1.7%)   10-year Gloriaer-Lorrieck v8.0b 1.64%   [x] <5%   [] ?5%    Lifetime (to age 85) Gloriaer-Chantalezick v8.0b 13.24% 11.73%  [x] Average risk (<15%)   [] Intermediate risk (?15% - <20%)   [] High risk (?20%)     Recommendations for individuals at average risk:   Breast self-exams beginning at age 25. Promptly report any changes to your provider.   Annual clinical breast exam beginning at age 25.   Annual 3-D screening mammogram beginning at age 40.   Breast cancer risk reduction:   Limit alcohol to one drink per day. Alcoholic beverages increase your risk for breast cancer.   Increase your physical activity. Exercise reduces the risk for breast and other cancers.    Don't smoke.   Maintain a healthy weight. Evidence suggests that maintaining a body mass index (BMI) of 20 - 25 helps to reduce breast cancer risk.  The use of combined estrogen/progesterone therapy can increase the risk for breast cancer when used for 3 to 5+ years. If you are taking this, please discuss with the prescribing provider.     Cancer risk reduction strategies  Most cancers are caused by a combination of aging, environmental exposures (chemicals, radiation), lifestyle factors (alcohol, smoking, obesity, sedentary lifestyle, poor diet), and medical conditions that cause infection or chronic inflammation in the body (diabetes, fatty liver disease, and viruses like HPV).     Don't smoke. Avoid second-hand smoke.   Limit alcohol consumption.   Exercise at least 150 minutes per week.   Maintain a healthy weight.   Eat a healthy diet.   Plant-based diets like the Mediterranean diet have been proven to reduce the risk for cancer and other medical conditions and help you live a longer, healthier life.   Eat more vegetables, fruits, nuts,  beans, and whole grains. Healthy proteins include eggs, poultry, and seafood.   Limit red meat to no more than three portions per week.   Avoid processed foods. These foods can contain artificial preservatives and may be high in salt, fat, and sugar. This includes sugary drinks, processed meats (deli meat, hot dogs, sausages), frozen pizza/meals, packaged baked goods (cookies, crackers, chips), most breakfast cereals, canned/instant soup, boxed instant pasta products, and sweetened yogurt.   Take steps to control or resolve any chronic medical conditions. For example, diabetes and viral infections (HPV, Brandon-Barr/mononucleosis, hepatitis B and C) are risk factors for cancer.   Protect against sexually transmitted infections.   Avoid UV exposure from the sun and tanning beds.   Wear all recommended protective gear in the workplace and report any safety concerns.

## 2024-10-21 ENCOUNTER — OFFICE VISIT (OUTPATIENT)
Dept: PRIMARY CARE CLINIC | Facility: CLINIC | Age: 39
End: 2024-10-21
Payer: COMMERCIAL

## 2024-10-21 VITALS
BODY MASS INDEX: 27.35 KG/M2 | OXYGEN SATURATION: 97 % | RESPIRATION RATE: 18 BRPM | HEIGHT: 66 IN | WEIGHT: 170.19 LBS | HEART RATE: 83 BPM | DIASTOLIC BLOOD PRESSURE: 72 MMHG | SYSTOLIC BLOOD PRESSURE: 109 MMHG

## 2024-10-21 DIAGNOSIS — J02.9 SORE THROAT: ICD-10-CM

## 2024-10-21 DIAGNOSIS — K21.9 GASTROESOPHAGEAL REFLUX DISEASE, UNSPECIFIED WHETHER ESOPHAGITIS PRESENT: ICD-10-CM

## 2024-10-21 DIAGNOSIS — J06.9 URI, ACUTE: Primary | ICD-10-CM

## 2024-10-21 LAB
CTP QC/QA: YES
CTP QC/QA: YES
MOLECULAR STREP A: NEGATIVE
SARS-COV-2 AG RESP QL IA.RAPID: NEGATIVE

## 2024-10-21 PROCEDURE — 87651 STREP A DNA AMP PROBE: CPT | Mod: QW,,, | Performed by: NURSE PRACTITIONER

## 2024-10-21 PROCEDURE — 99499 UNLISTED E&M SERVICE: CPT | Mod: S$GLB,,, | Performed by: NURSE PRACTITIONER

## 2024-10-21 PROCEDURE — 87811 SARS-COV-2 COVID19 W/OPTIC: CPT | Mod: QW,S$GLB,, | Performed by: NURSE PRACTITIONER

## 2024-10-21 NOTE — PROGRESS NOTES
Subjective:       Patient ID: Christie Bya is a 39 y.o. female.    Chief Complaint: Abdominal Pain, Cough, Nasal Congestion, and Sore Throat    Patient who is new to me presents for sore throat, nasal congestion, cough, abdominal pain, and sinus pressure. Symptoms started with sore throat last Tuesday. Reports her children were both recently treated with antibiotics for similar symptoms.     URI   This is a new problem. The current episode started in the past 7 days. The problem has been gradually worsening. There has been no fever. The cough is Productive of sputum. Associated symptoms include abdominal pain, congestion, coughing, headaches, nausea, rhinorrhea, sinus pain and a sore throat. Pertinent negatives include no chest pain, diarrhea, dysuria, ear pain, plugged ear sensation, rash, sneezing, vomiting or wheezing. She has tried acetaminophen and sleep (tylenol cold and sinus, flonase) for the symptoms. The treatment provided mild relief.     Review of Systems   Constitutional:  Negative for chills, fatigue and fever.   HENT:  Positive for congestion, postnasal drip, rhinorrhea, sinus pressure, sinus pain and sore throat. Negative for ear pain and sneezing.    Respiratory:  Positive for cough. Negative for chest tightness, shortness of breath and wheezing.    Cardiovascular:  Negative for chest pain, palpitations and leg swelling.   Gastrointestinal:  Positive for abdominal pain and nausea. Negative for abdominal distention, constipation, diarrhea and vomiting.   Genitourinary:  Negative for decreased urine volume, difficulty urinating, dysuria, frequency and urgency.   Musculoskeletal:  Negative for arthralgias, gait problem, joint swelling and myalgias.   Skin:  Negative for rash and wound.   Neurological:  Positive for headaches. Negative for dizziness, light-headedness and numbness.       Objective:      Physical Exam  Vitals and nursing note reviewed.   Constitutional:       Appearance:  She is well-developed.   HENT:      Head: Normocephalic and atraumatic.      Right Ear: Hearing, tympanic membrane, ear canal and external ear normal.      Left Ear: Hearing, ear canal and external ear normal. A middle ear effusion is present.      Nose:      Right Sinus: Maxillary sinus tenderness present.      Left Sinus: Maxillary sinus tenderness present.      Mouth/Throat:      Pharynx: Posterior oropharyngeal erythema and postnasal drip present.   Eyes:      Pupils: Pupils are equal, round, and reactive to light.   Cardiovascular:      Rate and Rhythm: Normal rate and regular rhythm.      Heart sounds: Normal heart sounds.   Pulmonary:      Effort: Pulmonary effort is normal.      Breath sounds: Normal breath sounds.   Abdominal:      General: Bowel sounds are normal.      Palpations: Abdomen is soft.   Musculoskeletal:         General: Normal range of motion.      Cervical back: Normal range of motion.   Skin:     General: Skin is warm and dry.   Neurological:      Mental Status: She is alert and oriented to person, place, and time.         Assessment:       1. URI, acute    2. Sore throat    3. Gastroesophageal reflux disease, unspecified whether esophagitis present        Plan:       Christie was seen today for abdominal pain, cough, nasal congestion and sore throat.    Diagnoses and all orders for this visit:    URI, acute    Sore throat  -     SARS Coronavirus 2 Antigen, POCT Manual Read  -     POCT Strep A, Molecular  -     Ambulatory referral/consult to ENT; Future    Gastroesophageal reflux disease, unspecified whether esophagitis present  -     Ambulatory referral/consult to ENT; Future      Strep and covid negative. Discussed symptomatic treatment. Cetrizine 10 mg at night. Mucinex DM b.i.d.   Saline nasal spray 4 squirts each nostril every 2-3 hours   Acetaminophen 650 mg extended release 1-2 tablets every 8 hours   Honey 1-2 tbsp as often as the patient experiences throat irritation cough  Sleep  with head elevated   Continue to mask until there are no symptoms of rhinorrhea or cough     Discussed worsening signs/symptoms and when to return to clinic or go to ED.   Patient expresses understanding and agrees with treatment plan.

## 2024-10-23 ENCOUNTER — PATIENT MESSAGE (OUTPATIENT)
Dept: PRIMARY CARE CLINIC | Facility: CLINIC | Age: 39
End: 2024-10-23
Payer: COMMERCIAL

## 2024-10-24 ENCOUNTER — OFFICE VISIT (OUTPATIENT)
Dept: OTOLARYNGOLOGY | Facility: CLINIC | Age: 39
End: 2024-10-24
Payer: COMMERCIAL

## 2024-10-24 VITALS
HEIGHT: 66 IN | SYSTOLIC BLOOD PRESSURE: 103 MMHG | WEIGHT: 165.38 LBS | DIASTOLIC BLOOD PRESSURE: 73 MMHG | HEART RATE: 84 BPM | BODY MASS INDEX: 26.58 KG/M2

## 2024-10-24 DIAGNOSIS — J34.2 DEVIATED NASAL SEPTUM: Chronic | ICD-10-CM

## 2024-10-24 DIAGNOSIS — J34.3 HYPERTROPHY OF BOTH INFERIOR NASAL TURBINATES: Chronic | ICD-10-CM

## 2024-10-24 DIAGNOSIS — J01.90 ACUTE BACTERIAL RHINOSINUSITIS: Primary | ICD-10-CM

## 2024-10-24 DIAGNOSIS — J30.9 ALLERGIC RHINITIS, UNSPECIFIED SEASONALITY, UNSPECIFIED TRIGGER: ICD-10-CM

## 2024-10-24 DIAGNOSIS — K21.9 LARYNGOPHARYNGEAL REFLUX (LPR): Chronic | ICD-10-CM

## 2024-10-24 DIAGNOSIS — K21.9 GASTROESOPHAGEAL REFLUX DISEASE, UNSPECIFIED WHETHER ESOPHAGITIS PRESENT: Chronic | ICD-10-CM

## 2024-10-24 DIAGNOSIS — R49.0 HOARSENESS OF VOICE: ICD-10-CM

## 2024-10-24 DIAGNOSIS — J32.0 CHRONIC MAXILLARY SINUSITIS: Primary | Chronic | ICD-10-CM

## 2024-10-24 DIAGNOSIS — B96.89 ACUTE BACTERIAL RHINOSINUSITIS: Primary | ICD-10-CM

## 2024-10-24 PROCEDURE — 99999 PR PBB SHADOW E&M-EST. PATIENT-LVL III: CPT | Mod: PBBFAC,,, | Performed by: OTOLARYNGOLOGY

## 2024-10-24 PROCEDURE — 3078F DIAST BP <80 MM HG: CPT | Mod: CPTII,S$GLB,, | Performed by: OTOLARYNGOLOGY

## 2024-10-24 PROCEDURE — 3008F BODY MASS INDEX DOCD: CPT | Mod: CPTII,S$GLB,, | Performed by: OTOLARYNGOLOGY

## 2024-10-24 PROCEDURE — 99204 OFFICE O/P NEW MOD 45 MIN: CPT | Mod: S$GLB,,, | Performed by: OTOLARYNGOLOGY

## 2024-10-24 PROCEDURE — 1159F MED LIST DOCD IN RCRD: CPT | Mod: CPTII,S$GLB,, | Performed by: OTOLARYNGOLOGY

## 2024-10-24 PROCEDURE — 1160F RVW MEDS BY RX/DR IN RCRD: CPT | Mod: CPTII,S$GLB,, | Performed by: OTOLARYNGOLOGY

## 2024-10-24 PROCEDURE — 3074F SYST BP LT 130 MM HG: CPT | Mod: CPTII,S$GLB,, | Performed by: OTOLARYNGOLOGY

## 2024-10-24 PROCEDURE — 3044F HG A1C LEVEL LT 7.0%: CPT | Mod: CPTII,S$GLB,, | Performed by: OTOLARYNGOLOGY

## 2024-10-24 RX ORDER — AZITHROMYCIN 250 MG/1
TABLET, FILM COATED ORAL
Qty: 6 TABLET | Refills: 0 | Status: SHIPPED | OUTPATIENT
Start: 2024-10-24 | End: 2024-10-29

## 2024-10-24 RX ORDER — CEFDINIR 300 MG/1
300 CAPSULE ORAL 2 TIMES DAILY
Qty: 20 CAPSULE | Refills: 0 | Status: SHIPPED | OUTPATIENT
Start: 2024-10-24 | End: 2024-11-03

## 2024-10-24 RX ORDER — FAMOTIDINE 40 MG/1
40 TABLET, FILM COATED ORAL NIGHTLY
Qty: 30 TABLET | Refills: 11 | Status: SHIPPED | OUTPATIENT
Start: 2024-10-24 | End: 2025-10-24

## 2024-10-24 RX ORDER — METHYLPREDNISOLONE 4 MG/1
TABLET ORAL
Qty: 21 TABLET | Refills: 0 | Status: SHIPPED | OUTPATIENT
Start: 2024-10-24

## 2024-10-24 RX ORDER — TRIAMCINOLONE ACETONIDE 55 UG/1
2 SPRAY, METERED NASAL DAILY
Qty: 16.9 ML | Refills: 5 | Status: SHIPPED | OUTPATIENT
Start: 2024-10-24

## 2024-10-24 RX ORDER — BENZONATATE 200 MG/1
200 CAPSULE ORAL 3 TIMES DAILY PRN
Qty: 30 CAPSULE | Refills: 1 | Status: SHIPPED | OUTPATIENT
Start: 2024-10-24 | End: 2024-11-13

## 2024-10-24 RX ORDER — ACETAMINOPHEN, DEXTROMETHORPHAN HYDROBROMIDE, GUAIFENESIN, AND PHENYLEPHRINE HYDROCHLORIDE 325; 10; 200; 5 MG/1; MG/1; MG/1; MG/1
TABLET, FILM COATED ORAL
COMMUNITY

## 2024-10-24 RX ORDER — AZITHROMYCIN 250 MG/1
TABLET, FILM COATED ORAL
Qty: 6 TABLET | Refills: 0 | Status: SHIPPED | OUTPATIENT
Start: 2024-10-24 | End: 2024-10-24

## 2024-10-24 NOTE — PATIENT INSTRUCTIONS
The patient will complete a course of antibiotics, steroids, and medical management for the sinusitis.   The patient will follow up for further discussion of the symptoms and management.     Pepcid daily for control of reflux symptoms.     The patient was given a prescription for a nasal steroid and/or nasal antihistamine nasal spray and we discussed in detail the proper mechanism of use directing the spray away from the nasal septum.  In addition, we also discussed that it will take 3-4 weeks of daily use to achieve maximal effectiveness.  The patient will please call in 3-4 weeks with their progress.  If allergy symptoms persist at that time, we could consider additional medications and possibly allergy testing.     How do you use a Nasal Corticosteroid Spray?    Make sure you understand your dosing instructions. Spray only the number of prescribed sprays in each nostril. Read the package instructions before using your spray the first time.    Most corticosteroid sprays suggest the following steps:    Wash your hands well.    Gently blow your nose to clear the passageway.    Shake the container several times.    Tilt your head slightly downward.  Use the opposite hand from the nostril you will be spraying to hold the spray bottle.    Block one nostril with your finger.  Insert the nasal applicator into the other nostril.    Aim the spray toward the outer wall of the nostril.  Inhale slowly through the nose and press the .    Breathe out and repeat to apply the prescribed number of sprays.  Repeat these steps for the other nostril.     Avoid sneezing or blowing your nose right after spraying.              VOCAL HYGIENE AND HYDRATION RECOMMENDATIONS     DO   Drink plenty of waterabout  oz a day! If your urine is pale, you should be well-hydrated.  Try some of these tips to increase hydration as well.  Eat wet snacks such as grapes, melon, cucumbers, apple slices   Reduce intake coffee and  other caffeinated and carbonated beverages   Suck on pastille lozenges or sugar free candies (not mentholated lozenges)   Use a room or personal humidifier   Use sinus rinses/irrigations or nasal saline spray   Inhale steam for a few minutes before speaking or singing   Pay attention to how, and how much, you use your voice.   Give yourself vocal breaks throughout the day.   Breathe when talking, take frequent pauses for breath.   Use a microphone when speaking in front of a group of 15 or more to reduce voice strain.   Learn how to use your voice correctly to get loud with voice therapy techniques.  Stay healthy. Eat right and get plenty of rest. Follow a reflux precaution diet if indicated.   ____________________________________________________________________    REDUCE   Loud talking, yelling, cheering, or screaming. Voice injury can take place over a long time, or all at once.  If you need to talk loud or yell, be sure you are doing it properly.   Clearing your throat and forceful coughing. The vocal folds collect mucus when irritated or inflamed and this can cause the urge to clear your throat. The trauma of clearing the throat creates more inflammation and mucus. See tips above for keeping hydrated to assist with cutting back on throat clearing.  Instead of clearing your throat, try these behaviors until the sensation passes:   Take a sip of water   Silently clear with a hard exhale making an hhh sound   Swallow hard   Drying agents such as anti-histamines or decongestant medications. You should always take medications as prescribed, but keep in mind these will dry you out, so increase your hydration!   ____________________________________________________________________    AVOID   Inhalant irritants such as smoke, strong fumes, and allergens. Take advantage of 1-800-QUIT-NOW if you are ready to stop smoking.   Unnecessary non-speech noises, such as grunting during exercise, loud noises, or excessively  high- or low-pitched sounds. Save your voice for talking and singing!   Whispering. Whispering places your vocal folds in a tenser position than usual.          THROAT CLEARING     Why am I clearing my throat so often?   Irritation of the vocal folds and surrounding area can cause the urge to clear your throat. This irritation can be caused by acid reflux, allergies, or environmental factors, as well as from a cold or sore throat. Talk with your doctor about the treatment of possible underlying causes. However, throat clearing can turn into a cycle. You clear your throat after feeling an irritation, which causes more irritation, which causes you to continue clearing your throat, which causes more irritation.     What can I do about it?   Ask a friend or family member to help you keep track - you may not even realize how often you do it.   Replace the throat clearing with a different behavior.   Take a sip of water and then swallow. Repeat until the sensation subsides.  Swallow hard (even without water)   Use the silent throat clear method by making the h sound when you exhale  Breathe in deeply through your nose and exhale on shhh   Hum quietly   Keep yourself hydrated.   Drink plenty of water   Eat wet snacks (grapes, apples, melon, cucumber)   Use a humidifier at home or at work   Suck on hard candies, but avoid too much sugar and avoid anything with mint, menthol, camphor, or eucaplyptus, as these will have a more irritating effect.

## 2024-10-24 NOTE — PROGRESS NOTES
Chief Complaint   Patient presents with    Sinus Problem     Pt stated she has nasal drip and soreness in throat        HPI:       Christie Bay is a 39 y.o. female who is referred by Eliane Paredes NP for evaluation of chronic sinusitis/nasal congestion. She a several week(s) history of nasal congestion and postnasal drip. She describes difficulty breathing at night. There is bilateral nasal obstruction, alternate.She does not use sinus rinses or nasal sprays. She has midface pain and pressure.  She has rhinorrhea and postnasal drip. There is not maxillary tooth pain. She denies headaches.  She has not had sinus or nasal surgery. There is no history of sinonasal trauma.  She does have frequent coughing and throat clearing and does feel that the voice has been affected by the chronic postnasal drip.  She has globus sensation as well.  She has not been diagnosed with allergic rhinitis.  She started with URI symptoms a few weeks ago and they have waxed and waned.  She has not had antibiotics or other treatments for this.  She has used Flonase and nasal saline in the past but none currently.    She does not have a history of nasal allergies, eczema, asthma.  She has not had allergy testing.  She does have small children that go to  and school and they have been sick a lot recently.  She has no pets, no carpeting, no construction or mold remediation in the house, and she is not exposed to smoking or vaping.    She does have a history of acid reflux and has recently taken Prilosec for that.  Started with URI a few weeks ago and have waxed and waned over last few weeks.  No abx or other treatemnts for this.    Has used flonase and saline NS in the past.                      Past Medical History:   Diagnosis Date    History of shingles     Migraine headache     Ulcer      Social History     Socioeconomic History    Marital status:     Number of children: 0   Occupational History     Occupation: ATC     Employer: [employer]   Tobacco Use    Smoking status: Never     Passive exposure: Never    Smokeless tobacco: Never   Substance and Sexual Activity    Alcohol use: Not Currently     Comment: 2 per month.    Drug use: Never    Sexual activity: Yes     Partners: Male     Social Drivers of Health     Financial Resource Strain: Low Risk  (2024)    Overall Financial Resource Strain (CARDIA)     Difficulty of Paying Living Expenses: Not hard at all   Food Insecurity: No Food Insecurity (2024)    Hunger Vital Sign     Worried About Running Out of Food in the Last Year: Never true     Ran Out of Food in the Last Year: Never true   Physical Activity: Insufficiently Active (2024)    Exercise Vital Sign     Days of Exercise per Week: 3 days     Minutes of Exercise per Session: 30 min   Stress: No Stress Concern Present (2024)    Citizen of [place] [employer] - Occupational Stress Questionnaire     Feeling of Stress : Not at all   Housing Stability: Unknown (2024)    Housing Stability Vital Sign     Unable to Pay for Housing in the Last Year: No     Past Surgical History:   Procedure Laterality Date    CHOLECYSTECTOMY      KNEE SURGERY Left     SHOULDER SURGERY Right      Family History   Problem Relation Name Age of Onset    Migraines Mother Yuliet     Skin cancer Mother Yuliet 35        type? on nose    Hypertension Father      Kidney cancer Maternal Grandmother Anjana 50    Diabetes Maternal Grandmother Anjana     Myelodysplastic syndrome Paternal Grandmother Tanya 64    Brain cancer Paternal Grandfather  60    Ovarian cancer Neg Hx      Colon cancer Neg Hx      Eclampsia Neg Hx      Miscarriages / Stillbirths Neg Hx       labor Neg Hx      Stroke Neg Hx             Review of Systems  General: negative for chills, fever or weight loss  Psychological: negative for mood changes or depression  Ophthalmic: negative for blurry vision, photophobia or eye  pain  ENT: see HPI  Respiratory: no cough, shortness of breath, or wheezing  Cardiovascular: no chest pain or dyspnea on exertion  Gastrointestinal: no abdominal pain, change in bowel habits, or black/ bloody stools  Musculoskeletal: negative for gait disturbance or muscular weakness  Neurological: no syncope or seizures; no ataxia  Dermatological: negative for pruritis,  rash and jaundice  Hematologic/lymphatic: no easy bruising, no new adenopathy      Physical Exam:    Vitals:    10/24/24 0835   BP: 103/73   Pulse: 84         Constitutional:   She is oriented to person, place, and time. Vital signs are normal. She appears well-developed and well-nourished. She appears alert. She is cooperative. Normal speech.      Head:  Normocephalic and atraumatic. Salivary glands normal.  Facial strength is normal.      Ears:  Hearing normal to normal and whispered voice; external ear normal without scars, lesions, or masses; ear canal, tympanic membrane, and middle ear normal., right ear hearing normal to normal and whispered voice; external ear normal without scars, lesions, or masses; ear canal, tympanic membrane, and middle ear normal. and left ear hearing normal to normal and whispered voice; external ear normal without scars, lesions, or masses; ear canal, tympanic membrane, and middle ear normal..   Right Ear: No middle ear effusion.   Left Ear:  No middle ear effusion.     Nose:  Mucosal edema, rhinorrhea and septal deviation (slight) present. No polyps. Turbinates abnormal and turbinate hypertrophy (3+, boggy, congested mucosa).  Right sinus exhibits no maxillary sinus tenderness and no frontal sinus tenderness. Left sinus exhibits maxillary sinus tenderness. Left sinus exhibits no frontal sinus tenderness.     Mouth/Throat  Oropharynx clear and moist without lesions or asymmetry, normal uvula midline, lips, teeth, and gums normal and oropharynx normal. No uvula swelling, oral lesions, trismus or mucous membrane  lesions. No oropharyngeal exudate, posterior oropharyngeal edema or posterior oropharyngeal erythema. Tonsils present, +1.  Mirror exam not performed due to patient tolerance.  Mirror exam not performed due to patient tolerance.      Neck:  Neck normal without thyromegaly masses, asymmetry, normal tracheal structure, crepitus, and tenderness, thyroid normal, trachea normal, phonation normal, full range of motion with neck supple and no adenopathy. No JVD present. Carotid bruit is not present. No thyroid mass and no thyromegaly present.     She has no cervical adenopathy.     Cardiovascular:    Normal rate, regular rhythm and rate and rhythm, heart sounds, and pulses normal.              Pulmonary/Chest:   Effort and breath sounds normal.     Psychiatric:   She has a normal mood and affect. Her speech is normal and behavior is normal.     Neurological:   She is alert and oriented to person, place, and time. She has neurological normal, alert and oriented. No cranial nerve deficit.     Skin:   No abrasions, lacerations, lesions, or rashes.               Assessment:    ICD-10-CM ICD-9-CM    1. Chronic maxillary sinusitis  J32.0 473.0 Ambulatory referral/consult to ENT      2. Gastroesophageal reflux disease, unspecified whether esophagitis present  K21.9 530.81 Ambulatory referral/consult to ENT      3. Deviated nasal septum  J34.2 470       4. Hypertrophy of both inferior nasal turbinates  J34.3 478.0       5. Allergic rhinitis, unspecified seasonality, unspecified trigger  J30.9 477.9       6. Hoarseness of voice  R49.0 784.42       7. Laryngopharyngeal reflux (LPR)  K21.9 478.79         The primary encounter diagnosis was Chronic maxillary sinusitis. Diagnoses of Gastroesophageal reflux disease, unspecified whether esophagitis present, Deviated nasal septum, Hypertrophy of both inferior nasal turbinates, Allergic rhinitis, unspecified seasonality, unspecified trigger, Hoarseness of voice, and Laryngopharyngeal  reflux (LPR) were also pertinent to this visit.      Plan:    Will treat her sinusitis with a course of antibiotics and steroids.  Start Pepcid daily, Tessalon Perles as needed for throat clearing and cough.  Gave instructions for vocal hygiene and reduction of throat clearing.  Start Nasacort nasal spray daily as well as nasal saline.    We will follow with the patient in a few weeks and plan for scope exam at that time after treating acute issue.  We will then determine if any surgical intervention for septal deviation or turbinate hypertrophy is needed.      Selene Oneal MD

## 2024-11-26 ENCOUNTER — IMMUNIZATION (OUTPATIENT)
Dept: INTERNAL MEDICINE | Facility: CLINIC | Age: 39
End: 2024-11-26
Payer: COMMERCIAL

## 2024-11-26 DIAGNOSIS — Z23 NEED FOR VACCINATION: Primary | ICD-10-CM

## 2024-11-26 PROCEDURE — 90471 IMMUNIZATION ADMIN: CPT | Mod: S$GLB,,, | Performed by: INTERNAL MEDICINE

## 2024-11-26 PROCEDURE — 90656 IIV3 VACC NO PRSV 0.5 ML IM: CPT | Mod: S$GLB,,, | Performed by: INTERNAL MEDICINE

## 2024-11-27 ENCOUNTER — OFFICE VISIT (OUTPATIENT)
Dept: OPTOMETRY | Facility: CLINIC | Age: 39
End: 2024-11-27
Payer: COMMERCIAL

## 2024-11-27 DIAGNOSIS — Z46.0 FITTING AND ADJUSTMENT OF SPECTACLES AND CONTACT LENSES: Primary | ICD-10-CM

## 2024-11-27 DIAGNOSIS — H52.13 MYOPIA, BILATERAL: Primary | ICD-10-CM

## 2024-11-27 DIAGNOSIS — Z97.3 WEARS CONTACT LENSES: ICD-10-CM

## 2024-11-27 DIAGNOSIS — Z46.0 FITTING AND ADJUSTMENT OF SPECTACLES AND CONTACT LENSES: ICD-10-CM

## 2024-11-27 PROCEDURE — 92015 DETERMINE REFRACTIVE STATE: CPT | Mod: S$GLB,,, | Performed by: OPTOMETRIST

## 2024-11-27 PROCEDURE — 92014 COMPRE OPH EXAM EST PT 1/>: CPT | Mod: S$GLB,,, | Performed by: OPTOMETRIST

## 2024-11-27 PROCEDURE — 99999 PR PBB SHADOW E&M-EST. PATIENT-LVL II: CPT | Mod: PBBFAC,,, | Performed by: OPTOMETRIST

## 2024-11-27 NOTE — PROGRESS NOTES
ESTRELLITA    JOAQUIN: 10/3/2022 - Dr. Hernandez     CC: Pt is here today for a routine eye exam with cl fit. Pt states that   vision in the toric lens becomes blurry when the lenses shift.      (+)Dryness  (-)Burning  (-)Itchiness  (-)Tearing  (-)Flashes  (+)Floaters   (-)Photophobia  (+)Eye Pain - only during migraines       Diabetic: no    Contact Lens: yes                           Sleep in CL?: 1-2x month                             Daily wear time: 17hrs                           Days worn before discarded: 1 month                            Solution: clear care     Eye Meds: Refresh PRN    Last edited by Kassie Vegas MA on 11/27/2024 10:42 AM.            Assessment /Plan     For exam results, see Encounter Report.    Myopia, bilateral   Rx specs    Wears contact lenses  Fitting and adjustment of spectacles and contact lenses        Chorioretinal scar, bilateral        Rx specs  CL fit today: good fit, vision and comfort OU              Changed brand: dispensed BL utlra,  trials of oasys at [facility] to try OU              OK to order if happy                Discussed RD precautions and discussed yearly DFE to monitor     RTC 1 year

## 2024-12-05 ENCOUNTER — OFFICE VISIT (OUTPATIENT)
Dept: OTOLARYNGOLOGY | Facility: CLINIC | Age: 39
End: 2024-12-05
Payer: COMMERCIAL

## 2024-12-05 VITALS
SYSTOLIC BLOOD PRESSURE: 111 MMHG | HEART RATE: 74 BPM | BODY MASS INDEX: 27.43 KG/M2 | DIASTOLIC BLOOD PRESSURE: 73 MMHG | WEIGHT: 170 LBS

## 2024-12-05 DIAGNOSIS — J30.9 ALLERGIC RHINITIS, UNSPECIFIED SEASONALITY, UNSPECIFIED TRIGGER: ICD-10-CM

## 2024-12-05 DIAGNOSIS — J34.3 HYPERTROPHY OF BOTH INFERIOR NASAL TURBINATES: Chronic | ICD-10-CM

## 2024-12-05 DIAGNOSIS — K21.9 LARYNGOPHARYNGEAL REFLUX (LPR): ICD-10-CM

## 2024-12-05 DIAGNOSIS — J32.0 CHRONIC MAXILLARY SINUSITIS: Primary | Chronic | ICD-10-CM

## 2024-12-05 DIAGNOSIS — J34.2 DEVIATED NASAL SEPTUM: Chronic | ICD-10-CM

## 2024-12-05 PROCEDURE — 31231 NASAL ENDOSCOPY DX: CPT | Mod: S$GLB,,, | Performed by: OTOLARYNGOLOGY

## 2024-12-05 PROCEDURE — 99999 PR PBB SHADOW E&M-EST. PATIENT-LVL III: CPT | Mod: PBBFAC,,, | Performed by: OTOLARYNGOLOGY

## 2024-12-05 PROCEDURE — 99214 OFFICE O/P EST MOD 30 MIN: CPT | Mod: 25,S$GLB,, | Performed by: OTOLARYNGOLOGY

## 2024-12-05 PROCEDURE — 3074F SYST BP LT 130 MM HG: CPT | Mod: CPTII,S$GLB,, | Performed by: OTOLARYNGOLOGY

## 2024-12-05 PROCEDURE — 3078F DIAST BP <80 MM HG: CPT | Mod: CPTII,S$GLB,, | Performed by: OTOLARYNGOLOGY

## 2024-12-05 PROCEDURE — 1159F MED LIST DOCD IN RCRD: CPT | Mod: CPTII,S$GLB,, | Performed by: OTOLARYNGOLOGY

## 2024-12-05 PROCEDURE — 3044F HG A1C LEVEL LT 7.0%: CPT | Mod: CPTII,S$GLB,, | Performed by: OTOLARYNGOLOGY

## 2024-12-05 PROCEDURE — 3008F BODY MASS INDEX DOCD: CPT | Mod: CPTII,S$GLB,, | Performed by: OTOLARYNGOLOGY

## 2024-12-05 PROCEDURE — 1160F RVW MEDS BY RX/DR IN RCRD: CPT | Mod: CPTII,S$GLB,, | Performed by: OTOLARYNGOLOGY

## 2024-12-05 RX ORDER — OLOPATADINE HYDROCHLORIDE AND MOMETASONE FUROATE 25; 665 UG/1; UG/1
2 SPRAY, METERED NASAL 2 TIMES DAILY
Qty: 29 G | Refills: 5 | Status: SHIPPED | OUTPATIENT
Start: 2024-12-05 | End: 2025-01-04

## 2024-12-05 NOTE — PATIENT INSTRUCTIONS
Increase nasacort to twice daily.   Prescribed ryaltris, start using this if approved by insurance and sent from pharmacy.    Continue Prilosec.    CT scan of sinuses to assess for chronic sinus disease.

## 2024-12-11 ENCOUNTER — HOSPITAL ENCOUNTER (OUTPATIENT)
Dept: RADIOLOGY | Facility: HOSPITAL | Age: 39
Discharge: HOME | End: 2024-12-11
Attending: OTOLARYNGOLOGY
Payer: COMMERCIAL

## 2024-12-11 DIAGNOSIS — J32.0 CHRONIC MAXILLARY SINUSITIS: Chronic | ICD-10-CM

## 2024-12-11 PROCEDURE — 70486 CT MAXILLOFACIAL W/O DYE: CPT | Mod: 26,,, | Performed by: RADIOLOGY

## 2024-12-11 PROCEDURE — 70486 CT MAXILLOFACIAL W/O DYE: CPT | Mod: TC

## 2024-12-12 NOTE — PROGRESS NOTES
CT scan films reviewed and shows rightward septal deviation.  Small area of soft tissue possibly causing compromise of left ostiomeatal complex.  Pneumatized paranasal sinuses do not show any significant chronic mucosal disease.    Keep planned follow-up with me and we will discuss further any surgical intervention such as septoplasty if the patient desires.      Selene Oneal MD  [facility]

## 2024-12-18 ENCOUNTER — PATIENT MESSAGE (OUTPATIENT)
Dept: OPTOMETRY | Facility: CLINIC | Age: 39
End: 2024-12-18
Payer: COMMERCIAL

## 2024-12-26 ENCOUNTER — PATIENT MESSAGE (OUTPATIENT)
Dept: PRIMARY CARE CLINIC | Facility: CLINIC | Age: 39
End: 2024-12-26
Payer: COMMERCIAL

## 2024-12-27 ENCOUNTER — PATIENT MESSAGE (OUTPATIENT)
Dept: OPTOMETRY | Facility: CLINIC | Age: 39
End: 2024-12-27
Payer: COMMERCIAL

## 2025-01-16 ENCOUNTER — TELEPHONE (OUTPATIENT)
Dept: OTOLARYNGOLOGY | Facility: CLINIC | Age: 40
End: 2025-01-16
Payer: COMMERCIAL

## 2025-01-16 NOTE — TELEPHONE ENCOUNTER
Returned call. Apt rescheduled to February 4th at 2:40PM. Pt thanked me and verbalized understanding.     ----- Message from Angela sent at 1/16/2025 11:18 AM CST -----  Contact: self  Type:  Sooner Appointment Request  Name of Caller: pt   When is the first available appointment? Pt is currently scheduled for Tuesday, 1/21 however, due to pending inclement weather and school closures, pt will be unable to attend that f/u appt.   Symptoms: F/U for sinus infection  Best Call Back Number:[phone]  Additional Information:  Pt is seeking a virtual appt or an appt sooner than 3/2025  Please call to advise... Thank you...

## 2025-02-14 ENCOUNTER — LAB VISIT (OUTPATIENT)
Dept: LAB | Facility: HOSPITAL | Age: 40
End: 2025-02-14
Attending: INTERNAL MEDICINE
Payer: COMMERCIAL

## 2025-02-14 ENCOUNTER — OFFICE VISIT (OUTPATIENT)
Dept: INTERNAL MEDICINE | Facility: CLINIC | Age: 40
End: 2025-02-14
Payer: COMMERCIAL

## 2025-02-14 VITALS
HEART RATE: 72 BPM | WEIGHT: 170.88 LBS | HEIGHT: 66 IN | SYSTOLIC BLOOD PRESSURE: 118 MMHG | OXYGEN SATURATION: 98 % | BODY MASS INDEX: 27.46 KG/M2 | DIASTOLIC BLOOD PRESSURE: 80 MMHG

## 2025-02-14 DIAGNOSIS — R10.9 ABDOMINAL PAIN, UNSPECIFIED ABDOMINAL LOCATION: Primary | ICD-10-CM

## 2025-02-14 DIAGNOSIS — R10.9 ABDOMINAL PAIN, UNSPECIFIED ABDOMINAL LOCATION: ICD-10-CM

## 2025-02-14 LAB
ALBUMIN SERPL BCP-MCNC: 4.2 G/DL (ref 3.5–5.2)
ALP SERPL-CCNC: 78 U/L (ref 40–150)
ALT SERPL W/O P-5'-P-CCNC: 21 U/L (ref 10–44)
ANION GAP SERPL CALC-SCNC: 8 MMOL/L (ref 8–16)
AST SERPL-CCNC: 37 U/L (ref 10–40)
BASOPHILS # BLD AUTO: 0.04 K/UL (ref 0–0.2)
BASOPHILS NFR BLD: 0.6 % (ref 0–1.9)
BILIRUB SERPL-MCNC: 0.5 MG/DL (ref 0.1–1)
BUN SERPL-MCNC: 8 MG/DL (ref 6–20)
CALCIUM SERPL-MCNC: 9.4 MG/DL (ref 8.7–10.5)
CHLORIDE SERPL-SCNC: 105 MMOL/L (ref 95–110)
CO2 SERPL-SCNC: 25 MMOL/L (ref 23–29)
CREAT SERPL-MCNC: 0.8 MG/DL (ref 0.5–1.4)
DIFFERENTIAL METHOD BLD: NORMAL
EOSINOPHIL # BLD AUTO: 0.1 K/UL (ref 0–0.5)
EOSINOPHIL NFR BLD: 0.8 % (ref 0–8)
ERYTHROCYTE [DISTWIDTH] IN BLOOD BY AUTOMATED COUNT: 11.9 % (ref 11.5–14.5)
EST. GFR  (NO RACE VARIABLE): >60 ML/MIN/1.73 M^2
GLUCOSE SERPL-MCNC: 80 MG/DL (ref 70–110)
HCT VFR BLD AUTO: 40.7 % (ref 37–48.5)
HGB BLD-MCNC: 13.3 G/DL (ref 12–16)
IMM GRANULOCYTES # BLD AUTO: 0.01 K/UL (ref 0–0.04)
IMM GRANULOCYTES NFR BLD AUTO: 0.1 % (ref 0–0.5)
LIPASE SERPL-CCNC: 19 U/L (ref 4–60)
LYMPHOCYTES # BLD AUTO: 1.7 K/UL (ref 1–4.8)
LYMPHOCYTES NFR BLD: 23.9 % (ref 18–48)
MCH RBC QN AUTO: 30.8 PG (ref 27–31)
MCHC RBC AUTO-ENTMCNC: 32.7 G/DL (ref 32–36)
MCV RBC AUTO: 94 FL (ref 82–98)
MONOCYTES # BLD AUTO: 0.5 K/UL (ref 0.3–1)
MONOCYTES NFR BLD: 7.6 % (ref 4–15)
NEUTROPHILS # BLD AUTO: 4.8 K/UL (ref 1.8–7.7)
NEUTROPHILS NFR BLD: 67 % (ref 38–73)
NRBC BLD-RTO: 0 /100 WBC
PLATELET # BLD AUTO: 227 K/UL (ref 150–450)
PMV BLD AUTO: 11.1 FL (ref 9.2–12.9)
POTASSIUM SERPL-SCNC: 4.5 MMOL/L (ref 3.5–5.1)
PROT SERPL-MCNC: 7.5 G/DL (ref 6–8.4)
RBC # BLD AUTO: 4.32 M/UL (ref 4–5.4)
SODIUM SERPL-SCNC: 138 MMOL/L (ref 136–145)
WBC # BLD AUTO: 7.1 K/UL (ref 3.9–12.7)

## 2025-02-14 PROCEDURE — 83690 ASSAY OF LIPASE: CPT | Performed by: INTERNAL MEDICINE

## 2025-02-14 PROCEDURE — 36415 COLL VENOUS BLD VENIPUNCTURE: CPT | Performed by: INTERNAL MEDICINE

## 2025-02-14 PROCEDURE — 80053 COMPREHEN METABOLIC PANEL: CPT | Performed by: INTERNAL MEDICINE

## 2025-02-14 PROCEDURE — 99999 PR PBB SHADOW E&M-EST. PATIENT-LVL IV: CPT | Mod: PBBFAC,,, | Performed by: INTERNAL MEDICINE

## 2025-02-14 PROCEDURE — 85025 COMPLETE CBC W/AUTO DIFF WBC: CPT | Performed by: INTERNAL MEDICINE

## 2025-02-14 RX ORDER — OMEPRAZOLE 40 MG/1
40 CAPSULE, DELAYED RELEASE ORAL DAILY
Qty: 90 CAPSULE | Refills: 1 | Status: SHIPPED | OUTPATIENT
Start: 2025-02-14 | End: 2026-02-14

## 2025-02-14 NOTE — PROGRESS NOTES
"Subjective:       Patient ID: Christie Bay is a 39 y.o. female.    Chief Complaint: GI Problem  39-year-old who presents today for urgent visit with GI symptoms reports that she has been taking Pepcid for LPR but previously had an ulcer when she was in grad school under more stress reports stress levels have been a bit higher with a toddler at home but she has not been taking any anti-inflammatory medications regularly she was concerned because she thought she might have an ulcer again she started having epigastric pain over the last few weeks and then she also had an episode of constipation and lower abdominal pain last weekend when she was out of town she reports she was doubled over for a bit but the symptoms resolved when she had a bowel movement.  She did start a multivitamin recently but other than that no new medicines changes.    GI Problem  The primary symptoms include abdominal pain.   The illness is also significant for constipation.     Review of Systems   Respiratory:  Negative for shortness of breath.    Gastrointestinal:  Positive for abdominal pain and constipation.        Epigastri pain       Objective:      Blood pressure 118/80, pulse 72, height 5' 6" (1.676 m), weight 77.5 kg (170 lb 13.7 oz), SpO2 98%.   Physical Exam  Constitutional:       General: She is not in acute distress.  HENT:      Head: Normocephalic.      Mouth/Throat:      Pharynx: Oropharynx is clear.   Eyes:      General: No scleral icterus.  Cardiovascular:      Rate and Rhythm: Normal rate and regular rhythm.      Heart sounds: Normal heart sounds. No murmur heard.     No friction rub. No gallop.   Pulmonary:      Effort: Pulmonary effort is normal. No respiratory distress.      Breath sounds: Normal breath sounds.   Abdominal:      General: Bowel sounds are normal.      Palpations: Abdomen is soft. There is no mass.      Comments: Mild epigastric pain deep palpation    Neurological:      Mental Status: She is " alert.         Assessment:       1. Abdominal pain, unspecified abdominal location        Plan:       Christie was seen today for gi problem.    Diagnoses and all orders for this visit:    Abdominal pain, unspecified abdominal location  -     CBC Auto Differential; Future  -     Comprehensive Metabolic Panel; Future  -     LIPASE; Future  -     US Abdomen Complete; Future  -     Ambulatory referral/consult to Gastroenterology; Future  -     omeprazole (PRILOSEC) 40 MG capsule; Take 1 capsule (40 mg total) by mouth once daily.    History of previous ulcer discussed GI follow-up for now start omeprazole instead of the Pepcid  Will update test and review

## 2025-02-17 ENCOUNTER — RESULTS FOLLOW-UP (OUTPATIENT)
Dept: INTERNAL MEDICINE | Facility: CLINIC | Age: 40
End: 2025-02-17
Payer: COMMERCIAL

## 2025-02-17 NOTE — TELEPHONE ENCOUNTER
----- Message from Jennifer Rosa MD sent at 2/17/2025 12:18 PM CST -----  See prior note also note her ultrasound was normal   ----- Message -----  From: Interface, Rad Results In  Sent: 2/17/2025   9:42 AM CST  To: Jennifer Rosa MD

## 2025-02-18 NOTE — TELEPHONE ENCOUNTER
----- Message from Jennifer Rosa MD sent at 2/17/2025  7:30 AM CST -----  Notify pt her labs were normal   Pancrease enzymes normal and blood count and liver test stable   ----- Message -----  From: Eron, [employer] [facility] Interface  Sent: 2/14/2025   6:08 PM CST  To: Jennifer Rosa MD

## 2025-02-20 ENCOUNTER — CLINICAL SUPPORT (OUTPATIENT)
Dept: OTHER | Facility: CLINIC | Age: 40
End: 2025-02-20

## 2025-02-20 ENCOUNTER — OFFICE VISIT (OUTPATIENT)
Dept: OBSTETRICS AND GYNECOLOGY | Facility: CLINIC | Age: 40
End: 2025-02-20
Payer: COMMERCIAL

## 2025-02-20 VITALS
DIASTOLIC BLOOD PRESSURE: 72 MMHG | WEIGHT: 173.94 LBS | HEIGHT: 66 IN | SYSTOLIC BLOOD PRESSURE: 104 MMHG | BODY MASS INDEX: 27.95 KG/M2

## 2025-02-20 DIAGNOSIS — Z00.8 ENCOUNTER FOR OTHER GENERAL EXAMINATION: ICD-10-CM

## 2025-02-20 DIAGNOSIS — Z01.419 WELL WOMAN EXAM WITH ROUTINE GYNECOLOGICAL EXAM: Primary | ICD-10-CM

## 2025-02-20 NOTE — PROGRESS NOTES
History & Physical  Gynecology      SUBJECTIVE:     Chief Complaint: Well Woman       History of Present Illness:  Annual Exam-Premenopausal  Patient presents for annual exam. She has no complaints today. Does report some recent bilateral lower pelvic pain that has resolved. Menstrual cycles are monthly, regular.  She is sexually active. GYN screening history: last pap: approximate date  paphpv and was normal. She participates in regular exercise: yes.  Smoking status:  no    Contraception: none needed    FH:  Breast cancer: mat grandmother  Colon cancer: neg  Ovarian cancer: neg    Review of patient's allergies indicates:   Allergen Reactions    Codeine Anaphylaxis    Demerol [meperidine] Anaphylaxis    Morphine Anaphylaxis       Past Medical History:   Diagnosis Date    History of shingles     Migraine headache     Ulcer      Past Surgical History:   Procedure Laterality Date    CHOLECYSTECTOMY      KNEE SURGERY Left     SHOULDER SURGERY Right      OB History          1    Para   1    Term   1       0    AB   0    Living   1         SAB   0    IAB   0    Ectopic   0    Multiple   0    Live Births   1               Family History   Problem Relation Name Age of Onset    Migraines Mother Yuliet     Skin cancer Mother Yuliet 35        type? on nose    Hypertension Father      Kidney cancer Maternal Grandmother Anjana 50    Diabetes Maternal Grandmother Anjana     Myelodysplastic syndrome Paternal Grandmother Tanya 64    Brain cancer Paternal Grandfather  60    Ovarian cancer Neg Hx      Colon cancer Neg Hx      Eclampsia Neg Hx      Miscarriages / Stillbirths Neg Hx       labor Neg Hx      Stroke Neg Hx       Social History[1]    Current Outpatient Medications   Medication Sig    omeprazole (PRILOSEC) 40 MG capsule Take 1 capsule (40 mg total) by mouth once daily.    triamcinolone (NASACORT) 55 mcg nasal inhaler 2 sprays by Nasal route once daily.    famotidine (PEPCID) 40 MG tablet Take 1 tablet (40  mg total) by mouth nightly. for 365 doses (Patient not taking: Reported on 2/20/2025)    sumatriptan (IMITREX) 100 MG tablet Take 1 tab at headache onset. May take 2nd tab 2 hours later if needed but no more than 2 tabs in 24 hours. (Patient not taking: Reported on 2/20/2025)     No current facility-administered medications for this visit.         Review of Systems:  Review of Systems   Constitutional:  Negative for appetite change, fever and unexpected weight change.   Respiratory:  Negative for shortness of breath.    Cardiovascular:  Negative for chest pain.   Gastrointestinal:  Negative for nausea and vomiting.   Genitourinary:  Negative for menorrhagia, menstrual problem, pelvic pain, vaginal bleeding, vaginal discharge and vaginal pain.   Integumentary:  Negative for breast mass.   Breast: Negative for lump and mass       OBJECTIVE:     Physical Exam:  Physical Exam  Vitals and nursing note reviewed.   Constitutional:       Appearance: She is well-developed.   Cardiovascular:      Rate and Rhythm: Normal rate and regular rhythm.      Heart sounds: Normal heart sounds.   Pulmonary:      Effort: Pulmonary effort is normal.      Breath sounds: Normal breath sounds.   Chest:   Breasts:     Breasts are symmetrical.      Right: No inverted nipple, mass, nipple discharge, skin change or tenderness.      Left: No inverted nipple, mass, nipple discharge, skin change or tenderness.   Abdominal:      Palpations: Abdomen is soft.   Genitourinary:     General: Normal vulva.      Labia:         Right: No rash, tenderness, lesion or injury.         Left: No rash, tenderness, lesion or injury.       Urethra: No prolapse, urethral pain, urethral swelling or urethral lesion.      Vagina: Normal. No signs of injury and foreign body. No vaginal discharge, erythema, tenderness or bleeding.      Cervix: No cervical motion tenderness, discharge or friability.      Uterus: Not deviated, not enlarged, not fixed and not tender.        Adnexa:         Right: No mass, tenderness or fullness.          Left: No mass, tenderness or fullness.        Rectum: No anal fissure or external hemorrhoid.      Comments: Urethral meatus: normal size, anterior vaginal wall with no prolapse, no lesions  Bladder: no fullness, masses or tenderness  Musculoskeletal:      Cervical back: Normal range of motion.   Neurological:      Mental Status: She is alert and oriented to person, place, and time.   Psychiatric:         Behavior: Behavior normal.         Thought Content: Thought content normal.         Judgment: Judgment normal.         Chaperoned by: Emma    ASSESSMENT:       ICD-10-CM ICD-9-CM    1. Well woman exam with routine gynecological exam  Z01.419 V72.31 Liquid-Based Pap Smear, Screening      HPV High Risk Genotypes, PCR             Plan:      Christie was seen today for well woman.    Diagnoses and all orders for this visit:    Well woman exam with routine gynecological exam  -     Liquid-Based Pap Smear, Screening  -     HPV High Risk Genotypes, PCR        Orders Placed This Encounter   Procedures    HPV High Risk Genotypes, PCR       Well Woman:  - Pap smear and hpv today  - Birth control: none needed  - GC/CT:n/a  - Mammogram: due age 41  - Smoking cessation: n/a  - Labs: none required   - Vaccines: none required  - Exercise counseling      Follow up in  one year for annual or prn.    Lashawn Mendez           [1]   Social History  Tobacco Use    Smoking status: Never     Passive exposure: Never    Smokeless tobacco: Never   Substance Use Topics    Alcohol use: Not Currently     Comment: 2 per year    Drug use: Never

## 2025-02-27 ENCOUNTER — RESULTS FOLLOW-UP (OUTPATIENT)
Dept: OBSTETRICS AND GYNECOLOGY | Facility: CLINIC | Age: 40
End: 2025-02-27

## 2025-03-07 VITALS
HEIGHT: 66 IN | SYSTOLIC BLOOD PRESSURE: 109 MMHG | DIASTOLIC BLOOD PRESSURE: 73 MMHG | BODY MASS INDEX: 26.68 KG/M2 | WEIGHT: 166 LBS

## 2025-03-07 LAB
HDLC SERPL-MCNC: 81 MG/DL
POC CHOLESTEROL, TOTAL: 218 MG/DL
POC GLUCOSE, FASTING: 92 MG/DL (ref 60–110)
TRIGL SERPL-MCNC: 44 MG/DL

## 2025-03-11 ENCOUNTER — PATIENT MESSAGE (OUTPATIENT)
Dept: OPTOMETRY | Facility: CLINIC | Age: 40
End: 2025-03-11
Payer: COMMERCIAL

## 2025-03-12 ENCOUNTER — HOSPITAL ENCOUNTER (EMERGENCY)
Facility: HOSPITAL | Age: 40
Discharge: HOME | End: 2025-03-13
Attending: EMERGENCY MEDICINE
Payer: COMMERCIAL

## 2025-03-12 DIAGNOSIS — B34.9 VIRAL SYNDROME: Primary | ICD-10-CM

## 2025-03-12 DIAGNOSIS — R51.9 HEADACHE: ICD-10-CM

## 2025-03-12 LAB
ALBUMIN SERPL BCP-MCNC: 4.2 G/DL (ref 3.5–5.2)
ALP SERPL-CCNC: 97 U/L (ref 40–150)
ALT SERPL W/O P-5'-P-CCNC: 53 U/L (ref 10–44)
ANION GAP SERPL CALC-SCNC: 11 MMOL/L (ref 8–16)
AST SERPL-CCNC: 38 U/L (ref 10–40)
BASOPHILS # BLD AUTO: 0.03 K/UL (ref 0–0.2)
BASOPHILS NFR BLD: 0.4 % (ref 0–1.9)
BILIRUB SERPL-MCNC: 0.3 MG/DL (ref 0.1–1)
BUN SERPL-MCNC: 9 MG/DL (ref 6–20)
CALCIUM SERPL-MCNC: 9.4 MG/DL (ref 8.7–10.5)
CHLORIDE SERPL-SCNC: 103 MMOL/L (ref 95–110)
CO2 SERPL-SCNC: 26 MMOL/L (ref 23–29)
CREAT SERPL-MCNC: 0.8 MG/DL (ref 0.5–1.4)
DIFFERENTIAL METHOD BLD: NORMAL
EOSINOPHIL # BLD AUTO: 0.1 K/UL (ref 0–0.5)
EOSINOPHIL NFR BLD: 1.2 % (ref 0–8)
ERYTHROCYTE [DISTWIDTH] IN BLOOD BY AUTOMATED COUNT: 11.9 % (ref 11.5–14.5)
EST. GFR  (NO RACE VARIABLE): >60 ML/MIN/1.73 M^2
GLUCOSE SERPL-MCNC: 93 MG/DL (ref 70–110)
HCT VFR BLD AUTO: 39.5 % (ref 37–48.5)
HCV AB SERPL QL IA: NORMAL
HGB BLD-MCNC: 13.1 G/DL (ref 12–16)
HIV 1+2 AB+HIV1 P24 AG SERPL QL IA: NORMAL
IMM GRANULOCYTES # BLD AUTO: 0.01 K/UL (ref 0–0.04)
IMM GRANULOCYTES NFR BLD AUTO: 0.1 % (ref 0–0.5)
LYMPHOCYTES # BLD AUTO: 1.8 K/UL (ref 1–4.8)
LYMPHOCYTES NFR BLD: 26.6 % (ref 18–48)
MCH RBC QN AUTO: 30.8 PG (ref 27–31)
MCHC RBC AUTO-ENTMCNC: 33.2 G/DL (ref 32–36)
MCV RBC AUTO: 93 FL (ref 82–98)
MONOCYTES # BLD AUTO: 0.5 K/UL (ref 0.3–1)
MONOCYTES NFR BLD: 7.6 % (ref 4–15)
NEUTROPHILS # BLD AUTO: 4.4 K/UL (ref 1.8–7.7)
NEUTROPHILS NFR BLD: 64.1 % (ref 38–73)
NRBC BLD-RTO: 0 /100 WBC
PLATELET # BLD AUTO: 242 K/UL (ref 150–450)
PMV BLD AUTO: 10.3 FL (ref 9.2–12.9)
POTASSIUM SERPL-SCNC: 4.2 MMOL/L (ref 3.5–5.1)
PROT SERPL-MCNC: 7.8 G/DL (ref 6–8.4)
RBC # BLD AUTO: 4.26 M/UL (ref 4–5.4)
SODIUM SERPL-SCNC: 140 MMOL/L (ref 136–145)
WBC # BLD AUTO: 6.93 K/UL (ref 3.9–12.7)

## 2025-03-12 PROCEDURE — 93005 ELECTROCARDIOGRAM TRACING: CPT

## 2025-03-12 PROCEDURE — 93010 ELECTROCARDIOGRAM REPORT: CPT | Mod: ,,, | Performed by: STUDENT IN AN ORGANIZED HEALTH CARE EDUCATION/TRAINING PROGRAM

## 2025-03-12 PROCEDURE — 99284 EMERGENCY DEPT VISIT MOD MDM: CPT | Mod: 25

## 2025-03-12 PROCEDURE — 86803 HEPATITIS C AB TEST: CPT | Performed by: PHYSICIAN ASSISTANT

## 2025-03-12 PROCEDURE — 87389 HIV-1 AG W/HIV-1&-2 AB AG IA: CPT | Performed by: PHYSICIAN ASSISTANT

## 2025-03-12 PROCEDURE — 80053 COMPREHEN METABOLIC PANEL: CPT | Performed by: PHYSICIAN ASSISTANT

## 2025-03-12 PROCEDURE — 85025 COMPLETE CBC W/AUTO DIFF WBC: CPT | Performed by: PHYSICIAN ASSISTANT

## 2025-03-13 ENCOUNTER — LAB VISIT (OUTPATIENT)
Dept: LAB | Facility: HOSPITAL | Age: 40
End: 2025-03-13
Attending: INTERNAL MEDICINE
Payer: COMMERCIAL

## 2025-03-13 ENCOUNTER — OFFICE VISIT (OUTPATIENT)
Dept: INTERNAL MEDICINE | Facility: CLINIC | Age: 40
End: 2025-03-13
Payer: COMMERCIAL

## 2025-03-13 VITALS
OXYGEN SATURATION: 99 % | RESPIRATION RATE: 16 BRPM | SYSTOLIC BLOOD PRESSURE: 120 MMHG | TEMPERATURE: 98 F | HEART RATE: 72 BPM | BODY MASS INDEX: 26.68 KG/M2 | WEIGHT: 166 LBS | HEIGHT: 66 IN | DIASTOLIC BLOOD PRESSURE: 78 MMHG

## 2025-03-13 VITALS
HEIGHT: 66 IN | DIASTOLIC BLOOD PRESSURE: 70 MMHG | SYSTOLIC BLOOD PRESSURE: 110 MMHG | HEART RATE: 83 BPM | BODY MASS INDEX: 27.25 KG/M2 | WEIGHT: 169.56 LBS | OXYGEN SATURATION: 98 %

## 2025-03-13 DIAGNOSIS — R74.8 ELEVATED LIVER ENZYMES: ICD-10-CM

## 2025-03-13 DIAGNOSIS — G43.109 MIGRAINE WITH AURA AND WITHOUT STATUS MIGRAINOSUS, NOT INTRACTABLE: Primary | ICD-10-CM

## 2025-03-13 DIAGNOSIS — H93.8X1 CONGESTION OF RIGHT EAR: ICD-10-CM

## 2025-03-13 DIAGNOSIS — R11.0 NAUSEA: ICD-10-CM

## 2025-03-13 LAB
ALBUMIN SERPL BCP-MCNC: 4.1 G/DL (ref 3.5–5.2)
ALP SERPL-CCNC: 84 U/L (ref 40–150)
ALT SERPL W/O P-5'-P-CCNC: 48 U/L (ref 10–44)
ANION GAP SERPL CALC-SCNC: 10 MMOL/L (ref 8–16)
AST SERPL-CCNC: 34 U/L (ref 10–40)
BILIRUB SERPL-MCNC: 0.7 MG/DL (ref 0.1–1)
BUN SERPL-MCNC: 10 MG/DL (ref 6–20)
CALCIUM SERPL-MCNC: 9.4 MG/DL (ref 8.7–10.5)
CHLORIDE SERPL-SCNC: 105 MMOL/L (ref 95–110)
CO2 SERPL-SCNC: 25 MMOL/L (ref 23–29)
CREAT SERPL-MCNC: 0.7 MG/DL (ref 0.5–1.4)
EST. GFR  (NO RACE VARIABLE): >60 ML/MIN/1.73 M^2
GLUCOSE SERPL-MCNC: 93 MG/DL (ref 70–110)
HAV IGM SERPL QL IA: NORMAL
HBV CORE IGM SERPL QL IA: NORMAL
HBV SURFACE AG SERPL QL IA: NORMAL
HCV AB SERPL QL IA: NORMAL
INFLUENZA A, MOLECULAR: NEGATIVE
INFLUENZA B, MOLECULAR: NEGATIVE
OHS QRS DURATION: 74 MS
OHS QTC CALCULATION: 414 MS
POTASSIUM SERPL-SCNC: 4.2 MMOL/L (ref 3.5–5.1)
PROT SERPL-MCNC: 7.6 G/DL (ref 6–8.4)
SARS-COV-2 RDRP RESP QL NAA+PROBE: NEGATIVE
SODIUM SERPL-SCNC: 140 MMOL/L (ref 136–145)
SPECIMEN SOURCE: NORMAL

## 2025-03-13 PROCEDURE — 1159F MED LIST DOCD IN RCRD: CPT | Mod: CPTII,S$GLB,, | Performed by: INTERNAL MEDICINE

## 2025-03-13 PROCEDURE — 99999 PR PBB SHADOW E&M-EST. PATIENT-LVL III: CPT | Mod: PBBFAC,,, | Performed by: INTERNAL MEDICINE

## 2025-03-13 PROCEDURE — 3078F DIAST BP <80 MM HG: CPT | Mod: CPTII,S$GLB,, | Performed by: INTERNAL MEDICINE

## 2025-03-13 PROCEDURE — 99213 OFFICE O/P EST LOW 20 MIN: CPT | Mod: S$GLB,,, | Performed by: INTERNAL MEDICINE

## 2025-03-13 PROCEDURE — 80074 ACUTE HEPATITIS PANEL: CPT | Performed by: INTERNAL MEDICINE

## 2025-03-13 PROCEDURE — 80053 COMPREHEN METABOLIC PANEL: CPT | Performed by: INTERNAL MEDICINE

## 2025-03-13 PROCEDURE — 3008F BODY MASS INDEX DOCD: CPT | Mod: CPTII,S$GLB,, | Performed by: INTERNAL MEDICINE

## 2025-03-13 PROCEDURE — 1160F RVW MEDS BY RX/DR IN RCRD: CPT | Mod: CPTII,S$GLB,, | Performed by: INTERNAL MEDICINE

## 2025-03-13 PROCEDURE — 36415 COLL VENOUS BLD VENIPUNCTURE: CPT | Performed by: INTERNAL MEDICINE

## 2025-03-13 PROCEDURE — 3074F SYST BP LT 130 MM HG: CPT | Mod: CPTII,S$GLB,, | Performed by: INTERNAL MEDICINE

## 2025-03-13 PROCEDURE — 87635 SARS-COV-2 COVID-19 AMP PRB: CPT | Performed by: EMERGENCY MEDICINE

## 2025-03-13 PROCEDURE — 87502 INFLUENZA DNA AMP PROBE: CPT | Performed by: EMERGENCY MEDICINE

## 2025-03-13 RX ORDER — ONDANSETRON 4 MG/1
4 TABLET, ORALLY DISINTEGRATING ORAL 2 TIMES DAILY
Qty: 60 TABLET | Refills: 3 | Status: SHIPPED | OUTPATIENT
Start: 2025-03-13

## 2025-03-13 NOTE — ED PROVIDER NOTES
"Encounter Date: 3/12/2025       History     Chief Complaint   Patient presents with    Eye Problem     Tunneled vision in the left eye since Monday, tingling to the right arm, and a headache.      40-year-old female no pertinent past medical history presents with vague nonspecific complaints including vision changes, headache and tingling which have all completely resolved and occurred at separate times.  She reports few days ago on Monday she began having "kaleidoscope" vision in her left eye that lasted a proximally 25 minutes now is resolved.  She also reports some tingling in her right arm with intermittent diffuse headache.  She reports a history of migraines but states her migraines are usually on the left side this was all over her head but worse in the right side.  She states the headache has been intermittent lasting about 2530 minutes.  Now currently resolved.  Denies any vision problems currently either.  She is also reports both of her kids have been sick.  She states her daughter recently had a urine infection in the flu and her son is congested right now.  Denies any fevers or chills.  She reports some congestion and nonbloody diarrhea.  Denies any nausea vomiting.  Denies any cough chest pain shortness of breath abdominal pain.  Denies any dysuria hematuria.  Denies any current vision problems, tingling or numbness.  Patient states she never had any weakness, slurred speech, facial asymmetry or difficulty walking.        The history is provided by the patient. No  was used.     Review of patient's allergies indicates:   Allergen Reactions    Codeine Anaphylaxis    Demerol [meperidine] Anaphylaxis    Morphine Anaphylaxis     Past Medical History:   Diagnosis Date    History of shingles     Migraine headache     Ulcer      Past Surgical History:   Procedure Laterality Date    CHOLECYSTECTOMY      KNEE SURGERY Left     SHOULDER SURGERY Right      Family History   Problem Relation Name " Age of Onset    Migraines Mother Yuliet     Skin cancer Mother Yuliet 35        type? on nose    Hypertension Father      Kidney cancer Maternal Grandmother Anjana 50    Diabetes Maternal Grandmother Anjana     Myelodysplastic syndrome Paternal Grandmother Tanya 64    Brain cancer Paternal Grandfather  60    Ovarian cancer Neg Hx      Colon cancer Neg Hx      Eclampsia Neg Hx      Miscarriages / Stillbirths Neg Hx       labor Neg Hx      Stroke Neg Hx       Social History[1]  Review of Systems    Physical Exam     Initial Vitals [259]   BP Pulse Resp Temp SpO2   129/83 83 18 97.8 °F (36.6 °C) 100 %      MAP       --         Physical Exam    Nursing note and vitals reviewed.  Constitutional: She appears well-developed. She is not diaphoretic. No distress.   HENT:   Head: Normocephalic and atraumatic.   Nose: Nose normal.   Eyes: EOM are normal. Pupils are equal, round, and reactive to light. No scleral icterus.   Neck: Neck supple.   Normal range of motion.  Cardiovascular:  Normal rate, regular rhythm, normal heart sounds and intact distal pulses.     Exam reveals no gallop and no friction rub.       No murmur heard.  Pulmonary/Chest: Breath sounds normal. No stridor. No respiratory distress. She has no wheezes. She has no rhonchi. She has no rales.   Abdominal: Abdomen is soft. Bowel sounds are normal. She exhibits no distension. There is no abdominal tenderness. There is no rebound and no guarding.   Musculoskeletal:         General: Normal range of motion.      Cervical back: Normal range of motion and neck supple.     Neurological: She is alert and oriented to person, place, and time. She has normal strength. No cranial nerve deficit or sensory deficit. GCS score is 15. GCS eye subscore is 4. GCS verbal subscore is 5. GCS motor subscore is 6.   Cranial nerves II through XII grossly intact. Finger to nose normal. Tone normal. Sens intact to light touch. No drift. No disdiadochokinesia. Strength 5/5  bilaterally upper and lower. Normal gait. No Romberg. Speech and cognition is normal.  No focal neurologic deficit.     Skin: Skin is warm and dry. Capillary refill takes less than 2 seconds. No rash noted.   Psychiatric: She has a normal mood and affect.         ED Course   Procedures  Labs Reviewed   COMPREHENSIVE METABOLIC PANEL - Abnormal       Result Value    Sodium 140      Potassium 4.2      Chloride 103      CO2 26      Glucose 93      BUN 9      Creatinine 0.8      Calcium 9.4      Total Protein 7.8      Albumin 4.2      Total Bilirubin 0.3      Alkaline Phosphatase 97      AST 38      ALT 53 (*)     eGFR >60.0      Anion Gap 11     INFLUENZA A & B BY MOLECULAR   HEPATITIS C ANTIBODY    Hepatitis C Ab Non-reactive      Narrative:     Release to patient->Immediate   HIV 1 / 2 ANTIBODY    HIV 1/2 Ag/Ab Non-reactive      Narrative:     Release to patient->Immediate   CBC W/ AUTO DIFFERENTIAL    WBC 6.93      RBC 4.26      Hemoglobin 13.1      Hematocrit 39.5      MCV 93      MCH 30.8      MCHC 33.2      RDW 11.9      Platelets 242      MPV 10.3      Immature Granulocytes 0.1      Gran # (ANC) 4.4      Immature Grans (Abs) 0.01      Lymph # 1.8      Mono # 0.5      Eos # 0.1      Baso # 0.03      nRBC 0      Gran % 64.1      Lymph % 26.6      Mono % 7.6      Eosinophil % 1.2      Basophil % 0.4      Differential Method Automated     SARS-COV-2 RNA AMPLIFICATION, QUAL          Imaging Results    None          Medications - No data to display  Medical Decision Making  40-year-old female no pertinent past medical history presents with vague nonspecific complaints including vision changes, headache and tingling which have all completely resolved. Unclear etiology of symptoms but likely 2/2 viral process or tension headache, migraine, or headache of non-emergent etiology. No focal neurological symptoms. Neuro exam is benign. Pt is nontoxic. VSS.    Unlikely CVA:  Normal neurologic exam.  Patient is a young and low  risk  Unlikely SAH: headache is non thunderclap. Headache is gradual, non-maximal at onset and similar to headaches in the past.  Unlikely Subdural/epidural hematoma: no history of trauma, no anticoagulation.  Unlikely Meningitis: afebrile, no meningismus,  mild photophobia.  Unlikely Temporal arteritis: pt < 60 years old.  no tenderness in temporal area  Unlikely Acute angle glaucoma: PERRL, no eye pain.  Unlikely Carbon Monoxide Poisoning: no other house members with similar symptoms.    Headache and symptoms resolved prior to arrival in the ED. Joint decision making was made with the patient avoid any imaging emergently given she is essentially asymptomatic with a normal neurologic exam.  Patient feeling much better and ready to go home.  Patient stable for discharge. Pt understands and agrees with discharge instructions. Pt also given strict return precautions for any new or worsening symptoms and plans to follow up closely with PCP.                                              Clinical Impression:  Final diagnoses:  [R51.9] Headache  [B34.9] Viral syndrome (Primary)          ED Disposition Condition    Discharge Stable          ED Prescriptions    None       Follow-up Information       Follow up With Specialties Details Why Contact Yulisa Zhong, DO Family Medicine Go in 1 day  [id number] [facility] [id number] [phone]      [facility] - Emergency Dept Emergency Medicine Go to  As needed, If symptoms worsen [address] [facility] [address] [phone]                 [1]   Social History  Tobacco Use    Smoking status: Never     Passive exposure: Never    Smokeless tobacco: Never   Substance Use Topics    Alcohol use: Not Currently     Comment: 2 per year    Drug use: Never        Nawaf Brigth MD  03/13/25 4424

## 2025-03-13 NOTE — FIRST PROVIDER EVALUATION
Emergency Department TeleTriage Encounter Note      CHIEF COMPLAINT    Chief Complaint   Patient presents with    Eye Problem     Tunneled vision in the left eye since Monday, tingling to the right arm, and a headache.        VITAL SIGNS   Initial Vitals [03/12/25 2119]   BP Pulse Resp Temp SpO2   129/83 83 18 97.8 °F (36.6 °C) 100 %      MAP       --            ALLERGIES    Review of patient's allergies indicates:   Allergen Reactions    Codeine Anaphylaxis    Demerol [meperidine] Anaphylaxis    Morphine Anaphylaxis       PROVIDER TRIAGE NOTE  40-year-old female presents with vague nonspecific complaints of tunnel vision intermittent out of the right eye since Monday, intermittent headaches tingling in the right arm and discomfort to the right side of her face.  Her vital signs are normal, she does not appear to be toxic, ill or distressed.      ORDERS  Labs Reviewed   HEPATITIS C ANTIBODY   HIV 1 / 2 ANTIBODY       ED Orders (720h ago, onward)      Start Ordered     Status Ordering Provider    03/12/25 2128 03/12/25 2127  Hepatitis C Antibody  STAT         Ordered KIM COLE    03/12/25 2128 03/12/25 2127  HIV 1/2 Ag/Ab (4th Gen)  STAT         Ordered KIM COLE              Virtual Visit Note: The provider triage portion of this emergency department evaluation and documentation was performed via [employer], a HIPAA-compliant telemedicine application, in concert with a tele-presenter in the room. A face to face patient evaluation with one of my colleagues will occur once the patient is placed in an emergency department room.      DISCLAIMER: This note was prepared with [employer] voice recognition transcription software. Garbled syntax, mangled pronouns, and other bizarre constructions may be attributed to that software system.

## 2025-03-13 NOTE — PROGRESS NOTES
Subjective:       Patient ID: Christie Bay is a 40 y.o. female.    Chief Complaint:   Headache (States her ear feels full and f/u on labs)    HPI - urgent care visit.  She has had a headache syndrome for most of this week, for the past 4 days.  She has long had migraines, but 4 days ago, she began to have visual changes and some arm sensation changes.  She has had photophobia with this as well.  The 1st headache lasted 3 hours with resolution with Tylenol and then it came back the next day and lasted 2-3 hours.  She felt poorly last night and went to the emergency room because she was worried about the visual changes.  Visual changes were consistent with ocular migraine- C-shaped scintillating scotoma Workup there was unremarkable except for a mild elevation of AST.  She has sumatriptan at home, but does not like to take it because it causes nausea.  She used goodies powder a couple of times, which worked for her headache.  She has some ear congestion.  Concerned about liver enzyme elevation.  She does not have a headache today.    Pmh/meds:  Reviewed and reconciled in EPIC with patient during visit today.    Review of Systems   Constitutional:  Negative for fever.   Respiratory:  Negative for shortness of breath.    Genitourinary:  Negative for difficulty urinating.   Skin:  Negative for rash.   Neurological:  Positive for headaches.       Objective:      Physical Exam  Vitals reviewed.   Constitutional:       Appearance: She is well-developed.   HENT:      Head: Normocephalic and atraumatic.      Left Ear: Tympanic membrane and ear canal normal.      Ears:      Comments: Right TM congested  Cardiovascular:      Rate and Rhythm: Normal rate and regular rhythm.      Heart sounds: Normal heart sounds. No murmur heard.     No friction rub. No gallop.   Pulmonary:      Effort: Pulmonary effort is normal. No respiratory distress.      Breath sounds: Normal breath sounds. No wheezing or rales.   Chest:       Chest wall: No tenderness.   Skin:     General: Skin is warm and dry.      Findings: No erythema.   Neurological:      General: No focal deficit present.      Mental Status: She is alert.   Psychiatric:         Mood and Affect: Mood normal.         Assessment:       1. Migraine with aura and without status migrainosus, not intractable    2. Elevated liver enzymes    3. Congestion of right ear    4. Nausea        Plan:       Christie was seen today for headache.    Diagnoses and all orders for this visit:    Migraine with aura and without status migrainosus, not intractable - worsening, with an exacerbation this week.  Discussed use of Tylenol 1st followed by sumatriptan.  Advised her to minimize use of Goody powder due to toxicity.    Elevated liver enzymes - trivial.  I provided reassurance.  I will repeat  -     Comprehensive Metabolic Panel; Future  -     HEPATITIS PANEL, ACUTE; Future    Congestion of right ear - noted on exam.  Advised over-the-counter Claritin    Nausea - comes with headache and with sumatriptan use.  Sublingual ondansetron should help  -     ondansetron (ZOFRAN-ODT) 4 MG TbDL; Take 1 tablet (4 mg total) by mouth 2 (two) times daily.    Rtc prn to establish care    CINTHYA Méndez MD MPH  Staff Internist

## 2025-03-13 NOTE — ED TRIAGE NOTES
Patient identifiers for Christie Bay 40 y.o. female checked and correct.  Chief Complaint   Patient presents with    Eye Problem     Tunneled vision in the left eye since Monday, tingling to the right arm, and a headache.      Past Medical History:   Diagnosis Date    History of shingles     Migraine headache     Ulcer      Allergies reported:   Review of patient's allergies indicates:   Allergen Reactions    Codeine Anaphylaxis    Demerol [meperidine] Anaphylaxis    Morphine Anaphylaxis         HEENT: Denies vision changes. Denies ear drainage or hearing loss. No c/o nasal drainage. Denies dysphagia or voice changes. +headache  Appearance: Pt awake, alert & oriented to person, place & time. Pt in no acute distress at present time. Pt is clean and well groomed with clothes appropriately fastened.   Skin: Skin warm, dry & intact. Color consistent with ethnicity. Mucous membranes moist. No breakdown or brusing noted.   Musculoskeletal: Patient moving all extremities well, no obvious swelling or deformities noted. +R arm tingling  Respiratory: Respirations spontaneous, even, and non-labored. Visible chest rise noted. Airway is open and patent. No accessory muscle use noted.   Neurologic: Sensation is intact. Speech is clear and appropriate. Eyes open spontaneously, behavior appropriate to situation, follows commands, facial expression symmetrical, bilateral hand grasp equal and even, purposeful motor response noted.  Cardiac: All peripheral pulses present. No Bilateral lower extremity edema.  Abdomen: Abdomen soft, non distended,   : Pt voids independently, denies dysuria, hematuria, frequency.

## 2025-03-24 RX ORDER — SUMATRIPTAN SUCCINATE 100 MG/1
TABLET ORAL
Qty: 9 TABLET | Refills: 2 | OUTPATIENT
Start: 2025-03-24

## 2025-03-26 RX ORDER — SUMATRIPTAN SUCCINATE 100 MG/1
TABLET ORAL
Qty: 9 TABLET | Refills: 2 | OUTPATIENT
Start: 2025-03-26

## 2025-04-03 ENCOUNTER — OFFICE VISIT (OUTPATIENT)
Dept: OTOLARYNGOLOGY | Facility: CLINIC | Age: 40
End: 2025-04-03
Payer: COMMERCIAL

## 2025-04-03 VITALS
WEIGHT: 170.5 LBS | DIASTOLIC BLOOD PRESSURE: 77 MMHG | HEART RATE: 71 BPM | SYSTOLIC BLOOD PRESSURE: 102 MMHG | BODY MASS INDEX: 27.52 KG/M2

## 2025-04-03 DIAGNOSIS — J34.3 HYPERTROPHY OF BOTH INFERIOR NASAL TURBINATES: Chronic | ICD-10-CM

## 2025-04-03 DIAGNOSIS — J30.9 ALLERGIC RHINITIS, UNSPECIFIED SEASONALITY, UNSPECIFIED TRIGGER: Chronic | ICD-10-CM

## 2025-04-03 DIAGNOSIS — J34.2 DEVIATED NASAL SEPTUM: Primary | Chronic | ICD-10-CM

## 2025-04-03 PROCEDURE — 1159F MED LIST DOCD IN RCRD: CPT | Mod: CPTII,S$GLB,, | Performed by: OTOLARYNGOLOGY

## 2025-04-03 PROCEDURE — 3008F BODY MASS INDEX DOCD: CPT | Mod: CPTII,S$GLB,, | Performed by: OTOLARYNGOLOGY

## 2025-04-03 PROCEDURE — 1160F RVW MEDS BY RX/DR IN RCRD: CPT | Mod: CPTII,S$GLB,, | Performed by: OTOLARYNGOLOGY

## 2025-04-03 PROCEDURE — 3074F SYST BP LT 130 MM HG: CPT | Mod: CPTII,S$GLB,, | Performed by: OTOLARYNGOLOGY

## 2025-04-03 PROCEDURE — 99214 OFFICE O/P EST MOD 30 MIN: CPT | Mod: S$GLB,,, | Performed by: OTOLARYNGOLOGY

## 2025-04-03 PROCEDURE — 99999 PR PBB SHADOW E&M-EST. PATIENT-LVL III: CPT | Mod: PBBFAC,,, | Performed by: OTOLARYNGOLOGY

## 2025-04-03 PROCEDURE — 3078F DIAST BP <80 MM HG: CPT | Mod: CPTII,S$GLB,, | Performed by: OTOLARYNGOLOGY

## 2025-04-03 RX ORDER — AZELASTINE 1 MG/ML
1 SPRAY, METERED NASAL 2 TIMES DAILY
Qty: 30 ML | Refills: 5 | Status: SHIPPED | OUTPATIENT
Start: 2025-04-03 | End: 2026-04-03

## 2025-04-03 NOTE — PATIENT INSTRUCTIONS
Continue nasacort, add azelastine (or OTC Astepro) to daily regimen.       We discussed septoplasty/turbinate reduction.  She will let me know if she does want to proceed with surgery.

## 2025-04-03 NOTE — PROGRESS NOTES
Chief Complaint   Patient presents with    Follow-up     Pt stil has minor sinus congestion /ear fullness         HPI:       Christie Bay is a 39 y.o. female who is referred by Eliane Paredes NP for evaluation of chronic sinusitis/nasal congestion. She a several week(s) history of nasal congestion and postnasal drip. She describes difficulty breathing at night. There is bilateral nasal obstruction, alternate.She does not use sinus rinses or nasal sprays. She has midface pain and pressure.  She has rhinorrhea and postnasal drip. There is not maxillary tooth pain. She denies headaches.  She has not had sinus or nasal surgery. There is no history of sinonasal trauma.  She does have frequent coughing and throat clearing and does feel that the voice has been affected by the chronic postnasal drip.  She has globus sensation as well.  She has not been diagnosed with allergic rhinitis.  She started with URI symptoms a few weeks ago and they have waxed and waned.  She has not had antibiotics or other treatments for this.  She has used Flonase and nasal saline in the past but none currently.    She does not have a history of nasal allergies, eczema, asthma.  She has not had allergy testing.  She does have small children that go to  and school and they have been sick a lot recently.  She has no pets, no carpeting, no construction or mold remediation in the house, and she is not exposed to smoking or vaping.    She does have a history of acid reflux and has recently taken Prilosec for that.  Started with URI a few weeks ago and have waxed and waned over last few weeks.  No abx or other treatemnts for this.    Has used flonase and saline NS in the past.                Interval HPI 12/05/2024 :      Reports she completed the round of antibiotics she was given at her last visit, and then started with new symptoms subsequent to that.  She started having tooth pain and was seen by her dentist and then an  endodontist who noted infection in the tooth requiring revision root canal, as the tooth had been previously worked on many years ago.  After having the dental work and completing another course of antibiotics, the sinus symptoms have resolved back to baseline.    She still has some ongoing nasal congestion, tends to be worse at night and 1st thing in the morning.  She is using the Nasacort but is only doing 1 spray each nostril daily.  Overall she does feel symptoms are better, but still feels that her nasal breathing is not optimal.    She has continued taking prilosec as well.     Interval HPI 04/03/2025 :      Symptoms fairly well-controlled.  She still uses Nasacort daily.  We attempted ordering Ryaltris, but the cost was too high and she decided to continue Nasacort.  She still gets intermittent symptoms of postnasal drip, but overall better than previous.        Past Medical History:   Diagnosis Date    History of shingles     Migraine headache     Ulcer      Social History     Socioeconomic History    Marital status:     Number of children: 0   Occupational History    Occupation: ATC     Employer: [employer]   Tobacco Use    Smoking status: Never     Passive exposure: Never    Smokeless tobacco: Never   Substance and Sexual Activity    Alcohol use: Not Currently     Comment: 2 per year    Drug use: Never    Sexual activity: Yes     Partners: Male, Female     Birth control/protection: None     Social Drivers of Health     Financial Resource Strain: Low Risk  (2/13/2025)    Overall Financial Resource Strain (CARDIA)     Difficulty of Paying Living Expenses: Not hard at all   Food Insecurity: No Food Insecurity (2/13/2025)    Hunger Vital Sign     Worried About Running Out of Food in the Last Year: Never true     Ran Out of Food in the Last Year: Never true   Transportation Needs: No Transportation Needs (2/13/2025)    PRAPARE - Transportation     Lack of Transportation (Medical): No      Lack of Transportation (Non-Medical): No   Physical Activity: Sufficiently Active (2025)    Exercise Vital Sign     Days of Exercise per Week: 4 days     Minutes of Exercise per Session: 40 min   Stress: No Stress Concern Present (2025)    [place] [place] [employer] - Occupational Stress Questionnaire     Feeling of Stress : Not at all   Housing Stability: Low Risk  (2025)    Housing Stability Vital Sign     Unable to Pay for Housing in the Last Year: No     Number of Times Moved in the Last Year: 0     Homeless in the Last Year: No     Past Surgical History:   Procedure Laterality Date    CHOLECYSTECTOMY      KNEE SURGERY Left     SHOULDER SURGERY Right      Family History   Problem Relation Name Age of Onset    Migraines Mother Yuliet     Skin cancer Mother Yuliet 35        type? on nose    Hypertension Father      Kidney cancer Maternal Grandmother Anjana 50    Diabetes Maternal Grandmother Anjana     Myelodysplastic syndrome Paternal Grandmother Tanya 64    Brain cancer Paternal Grandfather  60    Ovarian cancer Neg Hx      Colon cancer Neg Hx      Eclampsia Neg Hx      Miscarriages / Stillbirths Neg Hx       labor Neg Hx      Stroke Neg Hx             Review of Systems  General: negative for chills, fever or weight loss  Psychological: negative for mood changes or depression  Ophthalmic: negative for blurry vision, photophobia or eye pain  ENT: see HPI  Respiratory: no cough, shortness of breath, or wheezing  Cardiovascular: no chest pain or dyspnea on exertion  Gastrointestinal: no abdominal pain, change in bowel habits, or black/ bloody stools  Musculoskeletal: negative for gait disturbance or muscular weakness  Neurological: no syncope or seizures; no ataxia  Dermatological: negative for pruritis,  rash and jaundice  Hematologic/lymphatic: no easy bruising, no new adenopathy      Physical Exam:    Vitals:    25 1059   BP: 102/77   Pulse: 71           Constitutional:    She is oriented to person, place, and time. Vital signs are normal. She appears well-developed and well-nourished. She appears alert. She is cooperative. Normal speech.      Head:  Normocephalic and atraumatic. Salivary glands normal.  Facial strength is normal.      Ears:  Hearing normal to normal and whispered voice; external ear normal without scars, lesions, or masses; ear canal, tympanic membrane, and middle ear normal., right ear hearing normal to normal and whispered voice; external ear normal without scars, lesions, or masses; ear canal, tympanic membrane, and middle ear normal. and left ear hearing normal to normal and whispered voice; external ear normal without scars, lesions, or masses; ear canal, tympanic membrane, and middle ear normal..   Right Ear: No middle ear effusion.   Left Ear:  No middle ear effusion.     Nose:  Mucosal edema (Mild, improved overall) and septal deviation (slight) present. No rhinorrhea or polyps. Turbinate hypertrophy (3+, boggy, congested mucosa).  Turbinates normal.  Right sinus exhibits no maxillary sinus tenderness and no frontal sinus tenderness. Left sinus exhibits no maxillary sinus tenderness and no frontal sinus tenderness.     Mouth/Throat  Oropharynx clear and moist without lesions or asymmetry, normal uvula midline, lips, teeth, and gums normal and oropharynx normal. No uvula swelling, oral lesions, trismus or mucous membrane lesions. No oropharyngeal exudate, posterior oropharyngeal edema or posterior oropharyngeal erythema. Tonsils present, +1.  Mirror exam not performed due to patient tolerance.  Mirror exam not performed due to patient tolerance.      Neck:  Neck normal without thyromegaly masses, asymmetry, normal tracheal structure, crepitus, and tenderness, thyroid normal, trachea normal, phonation normal, full range of motion with neck supple and no adenopathy. No JVD present. Carotid bruit is not present. No thyroid mass and no thyromegaly present.      She has no cervical adenopathy.     Cardiovascular:    Normal rate, regular rhythm and rate and rhythm, heart sounds, and pulses normal.              Pulmonary/Chest:   Effort and breath sounds normal.     Psychiatric:   She has a normal mood and affect. Her speech is normal and behavior is normal.     Neurological:   She is alert and oriented to person, place, and time. She has neurological normal, alert and oriented. No cranial nerve deficit.     Skin:   No abrasions, lacerations, lesions, or rashes.       CT MEDTRONIC SINUSES WITHOUT- reviewed images and findings with patient today.     CLINICAL HISTORY:  Chronic sinus issues.  Nasal congestion/blockage and postnasal drip.  History of infected tooth.     TECHNIQUE:  Noncontrast CT imaging the sinuses was performed utilizing TopBlip protocol.  Sagittal and coronal reformats were created.     COMPARISON:  None     FINDINGS:  There is nasal septal deviation to the right.  No nasal polyp formation is identified.  Paradoxical turn of the left middle turbinate.     The ostiomeatal complex on the right is patent.  Mucosal thickening narrows the left maxillary ostium and infundibulum.     The paranasal sinuses are currently clear.     Evaluation for developmental variation demonstrates: Pneumatization of the Kimberli yayo, minimal pneumatization overlying the groove of the left anterior ethmoid artery, the sphenoid sinus septations insert on the carotid canals without dehiscence, and the sphenoid sinus is pneumatized below the sella.     No acute odontogenic periapical abscess formation in the maxilla is suggested.  No mucosal thickening along the floor of the maxillary sinuses.     The middle ears and mastoid air cells are clear.     No acute intracranial process.     Impression:     Nasal septal deviation to the right.     Mucosal thickening compromises the left ostiomeatal complex.     The paranasal sinuses are currently clear.                Assessment:    ICD-10-CM  ICD-9-CM    1. Deviated nasal septum  J34.2 470       2. Allergic rhinitis, unspecified seasonality, unspecified trigger  J30.9 477.9 azelastine (ASTELIN) 137 mcg (0.1 %) nasal spray      3. Hypertrophy of both inferior nasal turbinates  J34.3 478.0             The primary encounter diagnosis was Deviated nasal septum. Diagnoses of Allergic rhinitis, unspecified seasonality, unspecified trigger and Hypertrophy of both inferior nasal turbinates were also pertinent to this visit.      Plan:    Add azelastine nasal spray b.i.d..  Continue Nasacort.    We discussed in detail septoplasty and turbinate reduction and expected postoperative course.  She will decide whether she would like to proceed with surgery and let my office know.    If she does not pursue surgery and continues with medical therapy, we will follow-up with her in 4-6 months for routine visit.      Selene Oneal MD

## 2025-04-07 RX ORDER — SUMATRIPTAN SUCCINATE 100 MG/1
TABLET ORAL
Qty: 9 TABLET | Refills: 2 | OUTPATIENT
Start: 2025-04-07

## 2025-04-09 ENCOUNTER — ANESTHESIA EVENT (OUTPATIENT)
Dept: ENDOSCOPY | Facility: HOSPITAL | Age: 40
End: 2025-04-09
Payer: COMMERCIAL

## 2025-04-09 ENCOUNTER — LAB VISIT (OUTPATIENT)
Dept: LAB | Facility: HOSPITAL | Age: 40
End: 2025-04-09
Payer: COMMERCIAL

## 2025-04-09 ENCOUNTER — OFFICE VISIT (OUTPATIENT)
Dept: GASTROENTEROLOGY | Facility: CLINIC | Age: 40
End: 2025-04-09
Payer: COMMERCIAL

## 2025-04-09 ENCOUNTER — TELEPHONE (OUTPATIENT)
Dept: ENDOSCOPY | Facility: HOSPITAL | Age: 40
End: 2025-04-09
Payer: COMMERCIAL

## 2025-04-09 VITALS
SYSTOLIC BLOOD PRESSURE: 102 MMHG | DIASTOLIC BLOOD PRESSURE: 71 MMHG | HEART RATE: 74 BPM | HEIGHT: 66 IN | WEIGHT: 171.5 LBS | BODY MASS INDEX: 27.56 KG/M2

## 2025-04-09 VITALS — HEIGHT: 66 IN | WEIGHT: 171 LBS | BODY MASS INDEX: 27.48 KG/M2

## 2025-04-09 DIAGNOSIS — R10.9 ABDOMINAL PAIN, UNSPECIFIED ABDOMINAL LOCATION: Primary | ICD-10-CM

## 2025-04-09 DIAGNOSIS — F19.90 EXCESSIVE USE OF NONSTEROIDAL ANTI-INFLAMMATORY DRUGS (NSAIDS): ICD-10-CM

## 2025-04-09 DIAGNOSIS — R74.01 ELEVATED ALANINE AMINOTRANSFERASE (ALT) LEVEL: ICD-10-CM

## 2025-04-09 DIAGNOSIS — R10.9 ABDOMINAL PAIN, UNSPECIFIED ABDOMINAL LOCATION: ICD-10-CM

## 2025-04-09 DIAGNOSIS — R10.13 EPIGASTRIC PAIN: Primary | ICD-10-CM

## 2025-04-09 LAB
ALBUMIN SERPL BCP-MCNC: 4.1 G/DL (ref 3.5–5.2)
ALP SERPL-CCNC: 95 UNIT/L (ref 40–150)
ALT SERPL W/O P-5'-P-CCNC: 34 UNIT/L (ref 10–44)
AST SERPL-CCNC: 28 UNIT/L (ref 11–45)
BILIRUB DIRECT SERPL-MCNC: 0.2 MG/DL (ref 0.1–0.3)
BILIRUB SERPL-MCNC: 0.4 MG/DL (ref 0.1–1)
PROT SERPL-MCNC: 7.8 GM/DL (ref 6–8.4)

## 2025-04-09 PROCEDURE — 86677 HELICOBACTER PYLORI ANTIBODY: CPT

## 2025-04-09 PROCEDURE — 3078F DIAST BP <80 MM HG: CPT | Mod: CPTII,S$GLB,,

## 2025-04-09 PROCEDURE — 99204 OFFICE O/P NEW MOD 45 MIN: CPT | Mod: S$GLB,,,

## 2025-04-09 PROCEDURE — 80076 HEPATIC FUNCTION PANEL: CPT

## 2025-04-09 PROCEDURE — 3074F SYST BP LT 130 MM HG: CPT | Mod: CPTII,S$GLB,,

## 2025-04-09 PROCEDURE — 36415 COLL VENOUS BLD VENIPUNCTURE: CPT

## 2025-04-09 PROCEDURE — 99999 PR PBB SHADOW E&M-EST. PATIENT-LVL IV: CPT | Mod: PBBFAC,,,

## 2025-04-09 PROCEDURE — 1160F RVW MEDS BY RX/DR IN RCRD: CPT | Mod: CPTII,S$GLB,,

## 2025-04-09 PROCEDURE — 1159F MED LIST DOCD IN RCRD: CPT | Mod: CPTII,S$GLB,,

## 2025-04-09 PROCEDURE — 3008F BODY MASS INDEX DOCD: CPT | Mod: CPTII,S$GLB,,

## 2025-04-09 RX ORDER — OMEPRAZOLE 40 MG/1
40 CAPSULE, DELAYED RELEASE ORAL EVERY MORNING
Qty: 30 CAPSULE | Refills: 2 | Status: SHIPPED | OUTPATIENT
Start: 2025-04-09 | End: 2025-07-08

## 2025-04-09 NOTE — TELEPHONE ENCOUNTER
"----- Message from SARANYA Cat sent at 4/9/2025  1:05 PM CDT -----  Regarding: EGD  Procedure: EGDDiagnosis: Abdominal painProcedure Timing: Within 12 weeks#If within 4 weeks selected, please estelita as high priority##If greater than 12 weeks, please select "5-12 weeks" and delay sending until 3 months prior to requested date# Location: Any SiteAdditional Scheduling Information: No scheduling concernsPrep Specifications:N/AIs the patient taking a GLP-1 Agonist:noHave you attached a patient to this message: yes  "

## 2025-04-09 NOTE — PROGRESS NOTES
Gastroenterology Clinic Consultation Note    Reason for Visit:  The primary encounter diagnosis was Epigastric pain. Diagnoses of Elevated alanine aminotransferase (ALT) level and Excessive use of nonsteroidal anti-inflammatory drugs (NSAIDs) were also pertinent to this visit.    PCP:   Janak Méndez II   [address] / [place] [address]      Initial HPI   This is a 40 y.o. female presenting for epigastric pain  Patient in clinic with complaints of epigastric pain that have been going on for 1 month she was seen recently in urgent Care with same complaints.  She reports she had been taking NSAIDs (BC powder) regularly for migraines. she had been taking Pepcid for LPR but previously had an ulcer when she was in grad school under more stress reports stress levels have been a bit higher with a toddler at home. she also had an episode of constipation and lower abdominal pain last weekend when she was out of town she reports she was doubled over for a bit but the symptoms resolved when she had a bowel movement.  She reports since urgent care visit she has stopped NSAID use, drinking red bull.  Recent lab work showed elevated ALT level.  She reports the epigastric pain will sometimes radiate to the right upper quadrant.  Ultrasound done in February unremarkable.  She reports position and away from the side of the pain towards the left side makes the pain better.  Denies GLP 1 use.  Denies history of H pylori.  Denies family history of celiac, IBD and CRC.  Denies previous EGD and colonoscopy.  She has had gallbladder removal in the past.  She reports she is now having regular bowel movements daily.Denies unintentional weight loss, fever, chills, nausea, vomiting, constipation, diarrhea, esophageal reflux, regurgitation, hematemesis, difficulties swallowing, changes in bowel habits, changes in stool caliber, blood in stool, and melena.  She was taking a PPI for a couple weeks with relief in symptoms she has  "since discontinued.  .    ROS:  Review of Systems   Constitutional:  Negative for chills, fever, malaise/fatigue and weight loss.   Respiratory:  Negative for shortness of breath.    Cardiovascular:  Negative for chest pain.   Gastrointestinal:  Positive for abdominal pain. Negative for blood in stool, constipation, diarrhea, heartburn, melena, nausea and vomiting.   Genitourinary:  Negative for dysuria, flank pain, frequency, hematuria and urgency.   Neurological:  Negative for dizziness and weakness.        Medical History:  has a past medical history of History of shingles, Migraine headache, and Ulcer.    Surgical History:  has a past surgical history that includes Knee surgery (Left); Cholecystectomy; and Shoulder surgery (Right).    Family History: family history includes Brain cancer (age of onset: 60) in her paternal grandfather; Diabetes in her maternal grandmother; Hypertension in her father; Kidney cancer (age of onset: 50) in her maternal grandmother; Migraines in her mother; Myelodysplastic syndrome (age of onset: 64) in her paternal grandmother; Skin cancer (age of onset: 35) in her mother..       Review of patient's allergies indicates:   Allergen Reactions    Codeine Anaphylaxis    Demerol [meperidine] Anaphylaxis    Morphine Anaphylaxis       Medications Ordered Prior to Encounter[1]      Objective Findings:    Vital Signs:  /71   Pulse 74   Ht 5' 6" (1.676 m)   Wt 77.8 kg (171 lb 8.3 oz)   LMP 02/08/2025 (Exact Date)   BMI 27.68 kg/m²   Body mass index is 27.68 kg/m².    Physical Exam:  Physical Exam  Vitals and nursing note reviewed.   Constitutional:       General: She is not in acute distress.     Appearance: Normal appearance. She is not ill-appearing.   HENT:      Head: Normocephalic and atraumatic.      Right Ear: External ear normal.      Left Ear: External ear normal.      Nose: Nose normal.   Eyes:      General: No scleral icterus.     Extraocular Movements: Extraocular " movements intact.   Cardiovascular:      Rate and Rhythm: Normal rate.   Pulmonary:      Effort: Pulmonary effort is normal. No respiratory distress.   Abdominal:      General: There is no distension.      Palpations: Abdomen is soft.      Tenderness: There is no guarding.   Musculoskeletal:         General: Normal range of motion.      Cervical back: Normal range of motion.   Skin:     General: Skin is warm.   Neurological:      Mental Status: She is alert and oriented to person, place, and time.   Psychiatric:         Mood and Affect: Mood normal.         Behavior: Behavior is cooperative.         Thought Content: Thought content normal.             Labs:  Lab Results   Component Value Date    WBC 6.93 03/12/2025    HGB 13.1 03/12/2025    HCT 39.5 03/12/2025     03/12/2025    CHOL 200 (H) 12/27/2024    TRIG 69 12/27/2024    HDL 63 12/27/2024    ALKPHOS 95 04/09/2025    LIPASE 19 02/14/2025    ALT 34 04/09/2025    AST 28 04/09/2025     03/13/2025    K 4.2 03/13/2025     03/13/2025    CREATININE 0.7 03/13/2025    BUN 10 03/13/2025    CO2 25 03/13/2025    TSH 0.681 12/27/2024    HGBA1C 5.2 12/27/2024       Imaging reviewed:   EXAM:  US ABDOMEN COMPLETE     CLINICAL HISTORY: Unspecified abdominal pain     COMPARISON: Ultrasound of the abdomen 09/29/2021.     TECHNIQUE: Grayscale and color ultrasound images of the abdomen.     FINDINGS:  Liver measures up to 14.2 cm. Hepatic echogenicity is within normal limits. No focal hepatic lesion seen. Main portal vein is patent with hepatopedal flow.     Gallbladder is surgically absent.  Common bile duct measures up to 3 mm.     Visualized portions of the pancreas are unremarkable.     The right kidney measures 10.9 cm. Cortical thickness and echogenicity is normal. No discrete mass or stone seen. No hydronephrosis or perinephric abnormality.     The left kidney measures 12.0 cm. Cortical thickness and echogenicity is normal. No discrete mass or stone seen.  No hydronephrosis or perinephric abnormality.     Spleen is normal in size and echogenicity measuring up to 10.2 cm.     Visualized portions of the abdominal aorta and intrahepatic IVC are unremarkable. No significant ascites.        Impression:     No significant sonographic abnormality identified.           Finalized on: 2/17/2025 9:40 AM By:  Mariaa Costello MD      Endoscopy reviewed: NA       Assessment:  1. Epigastric pain    2. Elevated alanine aminotransferase (ALT) level    3. Excessive use of nonsteroidal anti-inflammatory drugs (NSAIDs)      Orders Placed This Encounter    H. PYLORI ANTIBODY, IGG    Hepatic Function Panel    omeprazole (PRILOSEC) 40 MG capsule     Plan:  EGD to assess ongoing epigastric pain   Repeat LFTs  H Pylori IgG to r/o - treat if positive    For GERD/Reflux:  Take your PPI 30-45 minutes before your first protein containing meal (breakfast) every day. Take daily for 8 weeks. If symptoms improve ok discontinue an use PPI as needed for symptoms.   Take Pepcid 20 mg every evening before bedtime to help with nocturnal symptoms, as needed.  Remain upright for at least 3 hours after eating.   Elevate the head of the bed for nighttime.   Avoid foods that you have noticed make your symptoms worse (possible triggers include: peppermint, alcohol, chocolate, caffeine, spicy foods, greasy/fried foods, acidic foods-citrus).   Lose weight if you are overweight -- of all of the lifestyle changes you can make, this one is the most effective.  Follow up in 8 weeks with provider to assess symptoms and ability to taper off PPI (can be a virtual visit).        Thank you for allowing me to participate in this patient's care.    Sincerely,     LACI DUKES-HERVE  Gastroenterology Department  [facility] [phone]           [1]   Current Outpatient Medications on File Prior to Visit   Medication Sig Dispense Refill    azelastine (ASTELIN) 137 mcg (0.1 %) nasal spray 1 spray  (137 mcg total) by Nasal route 2 (two) times daily. 30 mL 5    ondansetron (ZOFRAN-ODT) 4 MG TbDL Dissolve 1 tablet (4 mg total) by mouth 2 (two) times daily. 60 tablet 3    sumatriptan (IMITREX) 100 MG tablet Take 1 tab at headache onset. May take 2nd tab 2 hours later if needed but no more than 2 tabs in 24 hours. 9 tablet 2    triamcinolone (NASACORT) 55 mcg nasal inhaler 2 sprays by Nasal route once daily. (Patient not taking: Reported on 4/9/2025) 16.9 mL 5    [DISCONTINUED] omeprazole (PRILOSEC) 40 MG capsule Take 1 capsule (40 mg total) by mouth once daily. (Patient not taking: Reported on 4/9/2025) 90 capsule 1     No current facility-administered medications on file prior to visit.

## 2025-04-09 NOTE — H&P
Short Stay Endoscopy History and Physical    PCP - Janak Méndez II, MD  Referring Physician - OdilonYumiko, FNP-C  [address] [place] [address]    Procedure - EGD  ASA - per anesthesia  Mallampati - per anesthesia  History of Anesthesia problems - no  Family history Anesthesia problems -  no   Plan of anesthesia - General    HPI  40 y.o. female  Reason for procedure:  Abdominal pain, unspecified abdominal location [R10.9]      ROS:  Constitutional: No fevers, chills, No weight loss  CV: No chest pain  Pulm: No cough, No shortness of breath  GI: see HPI    Medical History:  has a past medical history of History of shingles, Migraine headache, and Ulcer.    Surgical History:  has a past surgical history that includes Knee surgery (Left); Cholecystectomy; and Shoulder surgery (Right).    Family History: family history includes Brain cancer (age of onset: 60) in her paternal grandfather; Diabetes in her maternal grandmother; Hypertension in her father; Kidney cancer (age of onset: 50) in her maternal grandmother; Migraines in her mother; Myelodysplastic syndrome (age of onset: 64) in her paternal grandmother; Skin cancer (age of onset: 35) in her mother..    Social History:  reports that she has never smoked. She has never been exposed to tobacco smoke. She has never used smokeless tobacco. She reports that she does not currently use alcohol. She reports that she does not use drugs.    Review of patient's allergies indicates:   Allergen Reactions    Codeine Anaphylaxis    Demerol [meperidine] Anaphylaxis    Morphine Anaphylaxis       Medications:   Prescriptions Prior to Admission[1]    Physical Exam:    Vital Signs: There were no vitals filed for this visit.    General Appearance: Well appearing in no acute distress  Abdomen: Soft, non tender, non distended with normal bowel sounds, no masses      Labs:  Lab Results   Component Value Date    WBC 6.93 03/12/2025    HGB 13.1 03/12/2025    HCT 39.5  03/12/2025     03/12/2025    CHOL 200 (H) 12/27/2024    TRIG 69 12/27/2024    HDL 63 12/27/2024    ALT 34 04/09/2025    AST 28 04/09/2025     03/13/2025    K 4.2 03/13/2025     03/13/2025    CREATININE 0.7 03/13/2025    BUN 10 03/13/2025    CO2 25 03/13/2025    TSH 0.681 12/27/2024    HGBA1C 5.2 12/27/2024       I have explained the risks and benefits of this endoscopic procedure to the patient including but not limited to bleeding, inflammation, infection, perforation, and death.    Assessment/Plan:     Abdominal pain     - Proceed with EGD     Krupa Oliveros MD  Gastroenterology   [facility]          [1]   Medications Prior to Admission   Medication Sig Dispense Refill Last Dose/Taking    azelastine (ASTELIN) 137 mcg (0.1 %) nasal spray 1 spray (137 mcg total) by Nasal route 2 (two) times daily. 30 mL 5     omeprazole (PRILOSEC) 40 MG capsule Take 1 capsule (40 mg total) by mouth every morning. 30 capsule 2     ondansetron (ZOFRAN-ODT) 4 MG TbDL Dissolve 1 tablet (4 mg total) by mouth 2 (two) times daily. 60 tablet 3     sumatriptan (IMITREX) 100 MG tablet Take 1 tab at headache onset. May take 2nd tab 2 hours later if needed but no more than 2 tabs in 24 hours. 9 tablet 2     triamcinolone (NASACORT) 55 mcg nasal inhaler 2 sprays by Nasal route once daily. (Patient not taking: Reported on 4/9/2025) 16.9 mL 5

## 2025-04-09 NOTE — ANESTHESIA PREPROCEDURE EVALUATION
04/09/2025  Christie Bay is a 40 y.o., female.    Procedure: EGD (ESOPHAGOGASTRODUODENOSCOPY) (N/A)       Problem List[1]    Past Medical History:   Diagnosis Date    History of shingles     Migraine headache     Ulcer        ECHO: Results for orders placed during the hospital encounter of 09/20/22    Echo    Interpretation Summary  · The left ventricle is normal in size with normal systolic function.  · The estimated ejection fraction is 55%.  · Normal left ventricular diastolic function.  · Normal right ventricular size with normal right ventricular systolic function.  · Normal central venous pressure (3 mmHg).  · The estimated PA systolic pressure is 18 mmHg.      There is no height or weight on file to calculate BMI.    Tobacco Use: Low Risk  (4/9/2025)    Patient History     Smoking Tobacco Use: Never     Smokeless Tobacco Use: Never     Passive Exposure: Never       Social History     Substance and Sexual Activity   Drug Use Never        Alcohol Use: Not At Risk (2/13/2025)    AUDIT-C     Frequency of Alcohol Consumption: Monthly or less     Average Number of Drinks: 1 or 2     Frequency of Binge Drinking: Never       Review of patient's allergies indicates:   Allergen Reactions    Codeine Anaphylaxis    Demerol [meperidine] Anaphylaxis    Morphine Anaphylaxis         Airway:  No value filed.    Pre-op Assessment    I have reviewed the Patient Summary Reports.    I have reviewed the NPO Status.   I have reviewed the Medications.     Review of Systems  Anesthesia Hx:             Denies Family Hx of Anesthesia complications.    Denies Personal Hx of Anesthesia complications.                    Hematology/Oncology:  Hematology Normal   Oncology Normal                                   EENT/Dental:  EENT/Dental Normal           Cardiovascular:  Cardiovascular Normal                                               Pulmonary:  Pulmonary Normal                       Renal/:  Renal/ Normal                 Hepatic/GI:  Hepatic/GI Normal                    Musculoskeletal:  Musculoskeletal Normal                Neurological:      Headaches      Dx of Headaches                           Endocrine:  Endocrine Normal            Dermatological:  Skin Normal    Psych:  Psychiatric Normal                     Anesthesia Plan  Type of Anesthesia, risks & benefits discussed:    Anesthesia Type: Gen Natural Airway  Intra-op Monitoring Plan: Standard ASA Monitors  Post Op Pain Control Plan: multimodal analgesia  Induction:  IV  Informed Consent: Informed consent signed with the Patient and all parties understand the risks and agree with anesthesia plan.  All questions answered.   ASA Score: 2  Day of Surgery Review of History & Physical: H&P Update referred to the surgeon/provider.    Ready For Surgery From Anesthesia Perspective.     .             [1]  Patient Active Problem List  Diagnosis    Pain in wrist

## 2025-04-09 NOTE — TELEPHONE ENCOUNTER
[facility]      Spoke to kirt to schedule procedure(s) Upper Endoscopy (EGD)       Physician to perform procedure(s) Dr. AMINATA Oliveros  Date of Procedure (s) 4/14/25  Arrival Time 11:00 AM  Time of Procedure(s) 12:00 PM   Location of Procedure(s) [place] 2nd Floor  Type of Rx Prep sent to patient: Other  Instructions provided to patient via Copy in hand    Patient was informed on the following information and verbalized understanding. Screening questionnaire reviewed with patient and complete. If procedure requires anesthesia, a responsible adult needs to be present to accompany the patient home, patient cannot drive after receiving anesthesia. Appointment details are tentative, especially check-in time. Patient will receive a prep-op call 7 days prior to confirm check-in time for procedure. If applicable the patient should contact their pharmacy to verify Rx for procedure prep is ready for pick-up. Patient was advised to call the scheduling department at [phone] if pharmacy states no Rx is available. Patient was advised to call the endoscopy scheduling department if any questions or concerns arise.       Endoscopy Scheduling Department

## 2025-04-10 ENCOUNTER — RESULTS FOLLOW-UP (OUTPATIENT)
Dept: GASTROENTEROLOGY | Facility: HOSPITAL | Age: 40
End: 2025-04-10

## 2025-04-10 LAB — H PYLORI IGG SERPL QL IA: NEGATIVE

## 2025-04-14 ENCOUNTER — HOSPITAL ENCOUNTER (OUTPATIENT)
Facility: HOSPITAL | Age: 40
Discharge: HOME | End: 2025-04-14
Attending: STUDENT IN AN ORGANIZED HEALTH CARE EDUCATION/TRAINING PROGRAM | Admitting: STUDENT IN AN ORGANIZED HEALTH CARE EDUCATION/TRAINING PROGRAM
Payer: COMMERCIAL

## 2025-04-14 ENCOUNTER — ANESTHESIA (OUTPATIENT)
Dept: ENDOSCOPY | Facility: HOSPITAL | Age: 40
End: 2025-04-14
Payer: COMMERCIAL

## 2025-04-14 VITALS
WEIGHT: 170 LBS | OXYGEN SATURATION: 100 % | TEMPERATURE: 97 F | RESPIRATION RATE: 18 BRPM | SYSTOLIC BLOOD PRESSURE: 108 MMHG | HEART RATE: 80 BPM | BODY MASS INDEX: 27.32 KG/M2 | HEIGHT: 66 IN | DIASTOLIC BLOOD PRESSURE: 72 MMHG

## 2025-04-14 DIAGNOSIS — R10.9 ABDOMINAL PAIN, UNSPECIFIED ABDOMINAL LOCATION: Primary | ICD-10-CM

## 2025-04-14 DIAGNOSIS — R10.9 ABDOMINAL PAIN: ICD-10-CM

## 2025-04-14 LAB
B-HCG UR QL: NEGATIVE
CTP QC/QA: YES

## 2025-04-14 PROCEDURE — 37000008 HC ANESTHESIA 1ST 15 MINUTES: Performed by: STUDENT IN AN ORGANIZED HEALTH CARE EDUCATION/TRAINING PROGRAM

## 2025-04-14 PROCEDURE — 99900035 HC TECH TIME PER 15 MIN (STAT)

## 2025-04-14 PROCEDURE — 43239 EGD BIOPSY SINGLE/MULTIPLE: CPT | Mod: ,,, | Performed by: STUDENT IN AN ORGANIZED HEALTH CARE EDUCATION/TRAINING PROGRAM

## 2025-04-14 PROCEDURE — 37000009 HC ANESTHESIA EA ADD 15 MINS: Performed by: STUDENT IN AN ORGANIZED HEALTH CARE EDUCATION/TRAINING PROGRAM

## 2025-04-14 PROCEDURE — 63600175 PHARM REV CODE 636 W HCPCS: Performed by: NURSE ANESTHETIST, CERTIFIED REGISTERED

## 2025-04-14 PROCEDURE — 25000003 PHARM REV CODE 250: Performed by: STUDENT IN AN ORGANIZED HEALTH CARE EDUCATION/TRAINING PROGRAM

## 2025-04-14 PROCEDURE — 81025 URINE PREGNANCY TEST: CPT | Performed by: STUDENT IN AN ORGANIZED HEALTH CARE EDUCATION/TRAINING PROGRAM

## 2025-04-14 PROCEDURE — 94761 N-INVAS EAR/PLS OXIMETRY MLT: CPT

## 2025-04-14 PROCEDURE — 25000003 PHARM REV CODE 250: Performed by: NURSE ANESTHETIST, CERTIFIED REGISTERED

## 2025-04-14 PROCEDURE — 88305 TISSUE EXAM BY PATHOLOGIST: CPT | Mod: TC,91 | Performed by: STUDENT IN AN ORGANIZED HEALTH CARE EDUCATION/TRAINING PROGRAM

## 2025-04-14 PROCEDURE — 43239 EGD BIOPSY SINGLE/MULTIPLE: CPT | Performed by: STUDENT IN AN ORGANIZED HEALTH CARE EDUCATION/TRAINING PROGRAM

## 2025-04-14 PROCEDURE — 27201012 HC FORCEPS, HOT/COLD, DISP: Performed by: STUDENT IN AN ORGANIZED HEALTH CARE EDUCATION/TRAINING PROGRAM

## 2025-04-14 RX ORDER — LIDOCAINE HYDROCHLORIDE 20 MG/ML
INJECTION INTRAVENOUS
Status: DISCONTINUED | OUTPATIENT
Start: 2025-04-14 | End: 2025-04-14

## 2025-04-14 RX ORDER — SODIUM CHLORIDE 9 MG/ML
INJECTION, SOLUTION INTRAVENOUS CONTINUOUS
Status: DISCONTINUED | OUTPATIENT
Start: 2025-04-14 | End: 2025-04-14 | Stop reason: HOSPADM

## 2025-04-14 RX ORDER — FENTANYL CITRATE 50 UG/ML
INJECTION, SOLUTION INTRAMUSCULAR; INTRAVENOUS
Status: DISCONTINUED | OUTPATIENT
Start: 2025-04-14 | End: 2025-04-14

## 2025-04-14 RX ORDER — PROPOFOL 10 MG/ML
VIAL (ML) INTRAVENOUS
Status: DISCONTINUED | OUTPATIENT
Start: 2025-04-14 | End: 2025-04-14

## 2025-04-14 RX ADMIN — SODIUM CHLORIDE: 9 INJECTION, SOLUTION INTRAVENOUS at 11:04

## 2025-04-14 RX ADMIN — LIDOCAINE HYDROCHLORIDE 100 MG: 20 INJECTION INTRAVENOUS at 11:04

## 2025-04-14 RX ADMIN — SODIUM CHLORIDE: 0.9 INJECTION, SOLUTION INTRAVENOUS at 11:04

## 2025-04-14 RX ADMIN — FENTANYL CITRATE 50 MCG: 50 INJECTION, SOLUTION INTRAMUSCULAR; INTRAVENOUS at 11:04

## 2025-04-14 RX ADMIN — GLYCOPYRROLATE 0.2 MG: 0.2 INJECTION, SOLUTION INTRAMUSCULAR; INTRAVENOUS at 11:04

## 2025-04-14 RX ADMIN — PROPOFOL 80 MG: 10 INJECTION, EMULSION INTRAVENOUS at 11:04

## 2025-04-14 RX ADMIN — PROPOFOL 150 MCG/KG/MIN: 10 INJECTION, EMULSION INTRAVENOUS at 11:04

## 2025-04-14 NOTE — PLAN OF CARE
Chart reviewed. Preop nursing care completed per orders. Safe surgery checklist complete. Pt denies any open wounds cuts or sores.Pt denies any metal in body or use of weight loss injections. UPT negative. Pt AAOX3, VSS on room air. Pt toileted, Bed locked in lowest position, Call light within reach. Pt denies any needs at this time. Belongings placed in locker #21.

## 2025-04-14 NOTE — PROVATION PATIENT INSTRUCTIONS
Discharge Summary/Instructions after an Endoscopic Procedure  Patient Name: Christei Bay  Patient MRN: [id number]  Patient YOB: 1985  Monday, April 14, 2025  Krupa Oliveros MD  Dear patient,  As a result of recent federal legislation (The Federal Cures Act), you may   receive lab or pathology results from your procedure in your [facility]   account before your physician is able to contact you. Your physician or   their representative will relay the results to you with their   recommendations at their soonest availability.  Thank you,  RESTRICTIONS:  During your procedure today, you received medications for sedation.  These   medications may affect your judgment, balance and coordination.  Therefore,   for 24 hours, you have the following restrictions:   - DO NOT drive a car, operate machinery, make legal/financial decisions,   sign important papers or drink alcohol.    ACTIVITY:  Today: no heavy lifting, straining or running due to procedural   sedation/anesthesia.  The following day: return to full activity including work.  DIET:  Eat and drink normally unless instructed otherwise.     TREATMENT FOR COMMON SIDE EFFECTS:  - Mild abdominal pain, nausea, belching, bloating or excessive gas:  rest,   eat lightly and use a heating pad.  - Sore Throat: treat with throat lozenges and/or gargle with warm salt   water.  - Because air was used during the procedure, expelling large amounts of air   from your rectum or belching is normal.  - If a bowel prep was taken, you may not have a bowel movement for 1-3 days.    This is normal.  SYMPTOMS TO WATCH FOR AND REPORT TO YOUR PHYSICIAN:  1. Abdominal pain or bloating, other than gas cramps.  2. Chest pain.  3. Back pain.  4. Signs of infection such as: chills or fever occurring within 24 hours   after the procedure.  5. Rectal bleeding, which would show as bright red, maroon, or black stools.   (A tablespoon of blood from the rectum is not serious, especially if    hemorrhoids are present.)  6. Vomiting.  7. Weakness or dizziness.  GO DIRECTLY TO THE NEAREST EMERGENCY ROOM IF YOU HAVE ANY OF THE FOLLOWING:      Difficulty breathing              Chills and/or fever over 101 F   Persistent vomiting and/or vomiting blood   Severe abdominal pain   Severe chest pain   Black, tarry stools   Bleeding- more than one tablespoon   Any other symptom or condition that you feel may need urgent attention  Your doctor recommends these additional instructions:  If any biopsies were taken, your doctors clinic will contact you in 1 to 2   weeks with any results.  - Discharge patient to home (ambulatory).   - Resume regular diet.   - Continue present medications.   - Await pathology results.  For questions, problems or results please call your physician - Krupa Oliveros MD at Work:  [phone].  [facility], EMERGENCY ROOM PHONE NUMBER: [phone]  IF A COMPLICATION OR EMERGENCY SITUATION ARISES AND YOU ARE UNABLE TO REACH   YOUR PHYSICIAN - GO DIRECTLY TO THE EMERGENCY ROOM.  Krupa Oliveros MD  4/14/2025 12:01:58 PM  This report has been verified and signed electronically.  Dear patient,  As a result of recent federal legislation (The Federal Cures Act), you may   receive lab or pathology results from your procedure in your [facility]   account before your physician is able to contact you. Your physician or   their representative will relay the results to you with their   recommendations at their soonest availability.  Thank you,  PROVATION

## 2025-04-14 NOTE — TRANSFER OF CARE
"Anesthesia Transfer of Care Note    Patient: Christie Bay    Procedure(s) Performed: Procedure(s) (LRB):  EGD (ESOPHAGOGASTRODUODENOSCOPY) (N/A)    Patient location: PACU    Anesthesia Type: general    Transport from OR: Transported from OR on room air with adequate spontaneous ventilation    Post pain: adequate analgesia    Post assessment: no apparent anesthetic complications and tolerated procedure well    Post vital signs: stable    Level of consciousness: awake and lethargic    Nausea/Vomiting: no nausea/vomiting    Complications: none    Transfer of care protocol was followed      Last vitals: Visit Vitals  /68 (BP Location: Left arm, Patient Position: Lying)   Pulse 67   Temp 37 °C (98.6 °F) (Temporal)   Resp 17   Ht 5' 6" (1.676 m)   Wt 77.1 kg (170 lb)   SpO2 100%   Breastfeeding No   BMI 27.44 kg/m²     "

## 2025-04-14 NOTE — PLAN OF CARE
Addended by: ZAMZAM MATIAS on: 12/22/2021 01:43 PM     Modules accepted: Orders     Pt arrived to recovery dosc via stretcher per endo team. Bedside report received pt attached to bedside monitor. VSS. Pt resting without distress post procedure. Pt on room air. Oxygen sat 99%. Pt IV access CDI, saline infusing. Safety maintained.

## 2025-04-14 NOTE — ANESTHESIA POSTPROCEDURE EVALUATION
Anesthesia Post Evaluation    Patient: Christie Bay    Procedure(s) Performed: Procedure(s) (LRB):  EGD (ESOPHAGOGASTRODUODENOSCOPY) (N/A)    Final Anesthesia Type: general      Patient location during evaluation: PACU  Patient participation: Yes- Able to Participate  Level of consciousness: awake and alert  Post-procedure vital signs: reviewed and stable  Pain management: adequate  Airway patency: patent    PONV status at discharge: No PONV  Anesthetic complications: no      Cardiovascular status: stable  Respiratory status: spontaneous ventilation  Hydration status: euvolemic  Follow-up not needed.          Vitals Value Taken Time   /72 04/14/25 12:22   Temp 36.2 °C (97.2 °F) 04/14/25 12:02   Pulse 80 04/14/25 12:22   Resp 18 04/14/25 12:22   SpO2 100 % 04/14/25 12:22         Event Time   Out of Recovery 12:17:00         Pain/Tex Score: Tex Score: 10 (4/14/2025 12:17 PM)

## 2025-04-17 ENCOUNTER — RESULTS FOLLOW-UP (OUTPATIENT)
Dept: GASTROENTEROLOGY | Facility: CLINIC | Age: 40
End: 2025-04-17

## 2025-04-17 LAB
ESTROGEN SERPL-MCNC: NORMAL PG/ML
INSULIN SERPL-ACNC: NORMAL U[IU]/ML
LAB AP CLINICAL INFORMATION: NORMAL
LAB AP GROSS DESCRIPTION: NORMAL
LAB AP PERFORMING LOCATION(S): NORMAL
LAB AP REPORT FOOTNOTES: NORMAL

## 2025-04-21 RX ORDER — SUMATRIPTAN SUCCINATE 100 MG/1
TABLET ORAL
Qty: 9 TABLET | Refills: 2 | OUTPATIENT
Start: 2025-04-21

## 2025-04-22 ENCOUNTER — OFFICE VISIT (OUTPATIENT)
Dept: URGENT CARE | Facility: CLINIC | Age: 40
End: 2025-04-22
Payer: COMMERCIAL

## 2025-04-22 ENCOUNTER — RESULTS FOLLOW-UP (OUTPATIENT)
Dept: URGENT CARE | Facility: CLINIC | Age: 40
End: 2025-04-22

## 2025-04-22 VITALS
HEIGHT: 66 IN | RESPIRATION RATE: 16 BRPM | DIASTOLIC BLOOD PRESSURE: 75 MMHG | HEART RATE: 91 BPM | OXYGEN SATURATION: 99 % | BODY MASS INDEX: 27.32 KG/M2 | TEMPERATURE: 99 F | SYSTOLIC BLOOD PRESSURE: 117 MMHG | WEIGHT: 170 LBS

## 2025-04-22 DIAGNOSIS — R05.9 COUGH, UNSPECIFIED TYPE: ICD-10-CM

## 2025-04-22 DIAGNOSIS — J40 BRONCHITIS: ICD-10-CM

## 2025-04-22 DIAGNOSIS — R06.2 WHEEZING: Primary | ICD-10-CM

## 2025-04-22 LAB
CTP QC/QA: YES
SARS CORONAVIRUS 2 ANTIGEN: NEGATIVE

## 2025-04-22 PROCEDURE — 94640 AIRWAY INHALATION TREATMENT: CPT | Mod: S$GLB,,, | Performed by: PHYSICIAN ASSISTANT

## 2025-04-22 PROCEDURE — 71046 X-RAY EXAM CHEST 2 VIEWS: CPT | Mod: FY,S$GLB,, | Performed by: RADIOLOGY

## 2025-04-22 PROCEDURE — 99213 OFFICE O/P EST LOW 20 MIN: CPT | Mod: 25,S$GLB,, | Performed by: PHYSICIAN ASSISTANT

## 2025-04-22 PROCEDURE — 87811 SARS-COV-2 COVID19 W/OPTIC: CPT | Mod: QW,S$GLB,, | Performed by: PHYSICIAN ASSISTANT

## 2025-04-22 RX ORDER — ALBUTEROL SULFATE 0.83 MG/ML
2.5 SOLUTION RESPIRATORY (INHALATION)
Status: COMPLETED | OUTPATIENT
Start: 2025-04-22 | End: 2025-04-22

## 2025-04-22 RX ORDER — PREDNISONE 20 MG/1
TABLET ORAL
Qty: 8 TABLET | Refills: 0 | Status: SHIPPED | OUTPATIENT
Start: 2025-04-22 | End: 2025-04-24

## 2025-04-22 RX ORDER — ALBUTEROL SULFATE 90 UG/1
1-2 INHALANT RESPIRATORY (INHALATION) EVERY 6 HOURS PRN
Qty: 18 G | Refills: 0 | Status: SHIPPED | OUTPATIENT
Start: 2025-04-22 | End: 2026-04-22

## 2025-04-22 RX ORDER — IPRATROPIUM BROMIDE 0.5 MG/2.5ML
0.5 SOLUTION RESPIRATORY (INHALATION)
Status: COMPLETED | OUTPATIENT
Start: 2025-04-22 | End: 2025-04-22

## 2025-04-22 RX ADMIN — IPRATROPIUM BROMIDE 0.5 MG: 0.5 SOLUTION RESPIRATORY (INHALATION) at 02:04

## 2025-04-22 RX ADMIN — ALBUTEROL SULFATE 2.5 MG: 0.83 SOLUTION RESPIRATORY (INHALATION) at 02:04

## 2025-04-22 NOTE — PROGRESS NOTES
"Subjective:      Patient ID: Christie Bay is a 40 y.o. female.    Vitals:  height is 5' 6" (1.676 m) and weight is 77.1 kg (170 lb). Her oral temperature is 98.6 °F (37 °C). Her blood pressure is 117/75 and her pulse is 91. Her respiration is 16 and oxygen saturation is 99%.     Chief Complaint: Cough    This is a 40 y.o. female who presents today with a chief complaint of cough and wheezing that began about 5 days ago.  Patient states that 1 week ago she had sore throat but she had had an EGD the day prior so assumed it was from that.  No fever, CP, or dyspnea.  Wheezing is described as crackles at the end of expiration that will sometimes provoke coughing.  Cough is dry.  No history of lung disease.    Cough  This is a new problem. The current episode started 1 to 4 weeks ago. The problem has been unchanged. The problem occurs constantly. The cough is Non-productive. Associated symptoms include wheezing. Pertinent negatives include no chest pain, chills, ear congestion, ear pain, eye redness, fever, headaches, heartburn, hemoptysis, myalgias, nasal congestion, postnasal drip, rash, rhinorrhea, sore throat, shortness of breath, sweats or weight loss. The symptoms are aggravated by lying down. There is no history of asthma, bronchiectasis, bronchitis, COPD, emphysema, environmental allergies or pneumonia.       Constitution: Negative for chills, sweating, fatigue and fever.   HENT:  Negative for ear pain, foreign body in ear, congestion, postnasal drip and sore throat.    Neck: Negative for neck pain and neck stiffness.   Cardiovascular:  Negative for chest pain, leg swelling and palpitations.   Eyes:  Negative for eye itching, eye pain and eye redness.   Respiratory:  Positive for cough and wheezing. Negative for sputum production, bloody sputum and shortness of breath.    Gastrointestinal:  Negative for abdominal pain, nausea, vomiting, diarrhea and heartburn.   Genitourinary:  Negative for " dysuria, frequency and urgency.   Musculoskeletal:  Negative for pain, joint swelling and muscle ache.   Skin:  Negative for color change and rash.   Allergic/Immunologic: Negative for environmental allergies.   Neurological:  Negative for dizziness, headaches, disorientation, altered mental status, numbness and tingling.   Psychiatric/Behavioral:  Negative for altered mental status and disorientation.       Objective:     Physical Exam   Constitutional: She is oriented to person, place, and time. She appears well-developed. She is cooperative.  Non-toxic appearance. She does not appear ill. No distress.   HENT:   Head: Normocephalic and atraumatic.   Ears:   Right Ear: Hearing, tympanic membrane, external ear and ear canal normal.   Left Ear: Hearing, tympanic membrane, external ear and ear canal normal.   Nose: Nose normal. No mucosal edema, rhinorrhea or nasal deformity. No epistaxis. Right sinus exhibits no maxillary sinus tenderness and no frontal sinus tenderness. Left sinus exhibits no maxillary sinus tenderness and no frontal sinus tenderness.   Mouth/Throat: Uvula is midline, oropharynx is clear and moist and mucous membranes are normal. No trismus in the jaw. Normal dentition. No uvula swelling. No oropharyngeal exudate, posterior oropharyngeal edema or posterior oropharyngeal erythema.   Eyes: Conjunctivae and lids are normal. No scleral icterus.   Neck: Trachea normal and phonation normal. Neck supple. No edema present. No erythema present. No neck rigidity present.   Cardiovascular: Normal rate, regular rhythm, normal heart sounds and normal pulses.   Pulmonary/Chest: Effort normal. No accessory muscle usage or stridor. No tachypnea and no bradypnea. No respiratory distress. She has no decreased breath sounds. She has wheezes (end expiratory wheeze in all lung fields). She has no rhonchi. She has no rales.   Very pleasant sitting comfortably in no acute distress.  No evidence of respiratory distress.     "     Comments: Very pleasant sitting comfortably in no acute distress.  No evidence of respiratory distress.    Abdominal: Normal appearance.   Musculoskeletal: Normal range of motion.         General: No deformity. Normal range of motion.   Lymphadenopathy:     She has no cervical adenopathy.   Neurological: She is alert and oriented to person, place, and time. She exhibits normal muscle tone. Coordination normal.   Skin: Skin is warm, dry, intact, not diaphoretic and not pale.   Psychiatric: Her speech is normal and behavior is normal. Judgment and thought content normal.   Nursing note and vitals reviewed.    Results for orders placed or performed in visit on 04/22/25   SARS Coronavirus 2 Antigen, POCT Manual Read    Collection Time: 04/22/25  2:22 PM   Result Value Ref Range    SARS Coronavirus 2 Antigen Negative Negative, Presumptive Negative     Acceptable Yes      Vitals:    04/22/25 1400 04/22/25 1447   BP: 117/75    BP Location: Left arm    Patient Position: Sitting    Pulse: 80 91   Resp: 16    Temp: 98.6 °F (37 °C)    TempSrc: Oral    SpO2: 98% 99%   Weight: 77.1 kg (170 lb)    Height: 5' 6" (1.676 m)      Pulse ox above on triage and after DuoNeb.      XR CHEST PA AND LATERAL  Result Date: 4/22/2025  EXAMINATION: XR CHEST PA AND LATERAL CLINICAL HISTORY: Cough, unspecified TECHNIQUE: PA and lateral views of the chest were performed. COMPARISON: 04/12/2021. FINDINGS: There are postoperative changes in the right upper abdominal quadrant.  The trachea is unremarkable.  The cardiomediastinal silhouette is within normal limits.  The hilar structures are unremarkable.  There are no pleural effusions.  There is no evidence of a pneumothorax.  There is no evidence of pneumomediastinum.  No airspace opacity is present. There is no evidence of free air beneath the hemidiaphragms.  The osseous structures are unremarkable.     No acute intrathoracic process. Electronically signed by: Leonel Perry MD " Date:    04/22/2025 Time:    16:11      Assessment:     1. Wheezing    2. Cough, unspecified type    3. Bronchitis        Plan:       Wheezing  -     albuterol nebulizer solution 2.5 mg  -     ipratropium 0.02 % nebulizer solution 0.5 mg  -     albuterol (PROVENTIL/VENTOLIN HFA) 90 mcg/actuation inhaler; Inhale 1-2 puffs into the lungs every 6 (six) hours as needed for Wheezing. Rescue  Dispense: 18 g; Refill: 0  -     predniSONE (DELTASONE) 20 MG tablet; Take 2 tablets (40 mg total) by mouth once daily for 3 days, THEN 1 tablet (20 mg total) once daily for 2 days.  Dispense: 8 tablet; Refill: 0  -     XR CHEST PA AND LATERAL; Future; Expected date: 04/22/2025    Cough, unspecified type  -     SARS Coronavirus 2 Antigen, POCT Manual Read  -     XR CHEST PA AND LATERAL; Future; Expected date: 04/22/2025    Bronchitis      Discussed ddx, home care, tx options, and given follow up precautions.  I have reviewed the patient's chart to view previous visits, labs, and imaging to assess PMH and look for any trends or previous treatments.  Instructed follow up with PCP, sooner p.r.n. for new or worsening symptoms

## 2025-04-22 NOTE — PATIENT INSTRUCTIONS
- Rest.    - Drink plenty of fluids.      - Tylenol (acetaminophen) or Ibuprofen as directed as needed for fever/pain. Avoid tylenol if you have a history of liver disease. Do not take ibuprofen if you have a history of GI bleeding, kidney disease, gastric surgery, or if you take blood thinners.     - continue allergy medication and nasal sprays    - you can take plain OTC Mucinex (guaifenesin) 1200 mg twice a day to help loosen mucous.     - Dextromethorphan (DM) is a cough suppressant over the counter (ie. mucinex DM, robitussin, delsym; dayquil/nyquil has DM as well.)    - You received a steroid (prednisone) today.  This can elevate your blood pressure, elevate your blood sugar, water weight gain, nervous energy, redness to the face and dimpling of the skin where the shot goes in.   - Do not use steroids more than 3 times per year.   - If you have diabetes, please check you blood sugar frequently.  - If you have high blood pressure, please check your blood pressure frequently.     - use albuterol inhaler as needed, as prescribed for wheezing, shortness of breath, coughing spells, chest tightness.    - Follow up with your PCP or specialty clinic as directed in the next 1-2 weeks if not improved or as needed.  You can call [phone] to schedule an appointment with the appropriate provider.      - Go to the ER if you develop new or worsening symptoms.     - You must understand that you have received an Urgent Care treatment only and that you may be released before all of your medical problems are known or treated.   - You, the patient, will arrange for follow up care as instructed.   - If your condition worsens or fails to improve we recommend that you receive another evaluation at the ER immediately or contact your PCP to discuss your concerns or return here.

## 2025-04-24 ENCOUNTER — OFFICE VISIT (OUTPATIENT)
Facility: CLINIC | Age: 40
End: 2025-04-24
Payer: COMMERCIAL

## 2025-04-24 VITALS — HEART RATE: 85 BPM | SYSTOLIC BLOOD PRESSURE: 115 MMHG | DIASTOLIC BLOOD PRESSURE: 75 MMHG

## 2025-04-24 DIAGNOSIS — G43.009 MIGRAINE WITHOUT AURA AND WITHOUT STATUS MIGRAINOSUS, NOT INTRACTABLE: ICD-10-CM

## 2025-04-24 DIAGNOSIS — G43.109 MIGRAINE WITH AURA AND WITHOUT STATUS MIGRAINOSUS, NOT INTRACTABLE: Primary | ICD-10-CM

## 2025-04-24 PROCEDURE — 3078F DIAST BP <80 MM HG: CPT | Mod: CPTII,S$GLB,, | Performed by: PSYCHIATRY & NEUROLOGY

## 2025-04-24 PROCEDURE — 99214 OFFICE O/P EST MOD 30 MIN: CPT | Mod: S$GLB,,, | Performed by: PSYCHIATRY & NEUROLOGY

## 2025-04-24 PROCEDURE — 3074F SYST BP LT 130 MM HG: CPT | Mod: CPTII,S$GLB,, | Performed by: PSYCHIATRY & NEUROLOGY

## 2025-04-24 PROCEDURE — 1159F MED LIST DOCD IN RCRD: CPT | Mod: CPTII,S$GLB,, | Performed by: PSYCHIATRY & NEUROLOGY

## 2025-04-24 PROCEDURE — 99999 PR PBB SHADOW E&M-EST. PATIENT-LVL III: CPT | Mod: PBBFAC,,, | Performed by: PSYCHIATRY & NEUROLOGY

## 2025-04-24 PROCEDURE — 1160F RVW MEDS BY RX/DR IN RCRD: CPT | Mod: CPTII,S$GLB,, | Performed by: PSYCHIATRY & NEUROLOGY

## 2025-04-24 RX ORDER — RIZATRIPTAN BENZOATE 10 MG/1
TABLET ORAL
Qty: 6 TABLET | Refills: 2 | Status: SHIPPED | OUTPATIENT
Start: 2025-04-24

## 2025-04-24 RX ORDER — PREDNISONE 20 MG/1
TABLET ORAL
Start: 2025-04-24 | End: 2025-04-29

## 2025-04-24 NOTE — PROGRESS NOTES
Chief Complaint: Headache    Subjective:     History of Present Illness    Referring Provider: Self Referral  Accompanied by: No pain     09/16/2022: Christie Bay is a 37 y.o. female with h/o Covid who presents for headache evaluation. She states that she will get a headache 2 days prior to her menstrual cycle but most recently headache was not related to her menstrual cycle and lasted about a week. Headaches started after her gall bladder surgery in 12/2008. Typically, headaches are once a month as above with menstrual cycle, last 2-3 days, right forehead to above right ear that wraps around to right face and behind right eye, pressure. Typically, she has associated photophobia, nausea. She denies associated weakness, numbness, tingling, phonophobia, vomiting, change in vision, aura. Sometimes typical headaches stop her ADLs. She states that BC powder is the only thing seems to take the edge off the first day and resolves the headache the 2nd day. Of note, she had a stomach ulcer in grad school. She has not tried prescription abortive therapy or Excedrin. She has tried Tylenol, ibuprofen. She has not done prophylactic headache medication. She is not currently pregnant.      The unusual headache started on 9/10/22 in the evening, denies an inciting event (injury, medication change), not currently near her menstrual cycle, left forehead to left side to left occipital to base of left skull to behind left eye and on day 3 or 4 of the headache is behind both eyes, constant with varying intensity, pressure, this morning is dull roar. She denies neck pain or recent neck injury. She has associated photophobia, nausea. She denies associated phonophobia, weakness, numbness, tingling, change in vision, dizziness, aura. She denies having had a headache like this before. She has been sleeping decently and wakes up rested. She denies snoring and apnea. She denies a positional component and vasal vagal maneuvers  do not significantly worsen headaches. Headache woke her up once and denies waking up with headaches. Mood is fine. She has tried BC powder, ibuprofen, Tylenol and none of it has helped.    04/24/2025: Christie Bay is a 40 y.o. female with h/o Covid, migraine without aura who presents for follow up. On last visit, prescribed medrol dose pack and counseled on Excedrin and sumatriptan. She states she is trying to get a refill for the sumatriptan. Headaches are occurring more frequently and occur 2 weeks before period and again a day after her period, left eye to left temple, squeezing, lasts 3 days, sumatriptan helps and would cause headache to last 1-2 days and makes her nauseated so taking Zofran from PCP and was taking BC powder but caused GI issue so off of it now. She has associated photophobia, nausea, left eye blurred vision, aura. On 3/11/25, she had kaleidescope vision in left eye followed by vision loss in left eye and lasted 20 minutes and then a mild headache occurred that was base of skull and right sided and Tylenol helped and headache lasted 1 day, occurred once previously in life but was shorter in duration. She denies neck pain. She is sleeping well and headache will wake her up if off her medication. She wakes up rested. Mood is decent. Medrol helped from last visit and denies side effects other than feeling jittery. She tried Excedrin last month when stopped BC powder and worked okay. She denies other side effects to sumatriptan. She had EGD last week to check gut health.    Today, I personally reviewed the internal medicine notes that were completed after any previous visit to the sports neurolog[facility] clinic as documented in the patient's electronic medical record. This review was done to analyze the patient's progress and findings as it relates to the conditions that the sports neurology clinic is treating.    Medications Ordered Prior to Encounter[1]    Review of patient's allergies  indicates:   Allergen Reactions    Codeine Anaphylaxis    Demerol [meperidine] Anaphylaxis    Morphine Anaphylaxis       Family History   Problem Relation Name Age of Onset    Migraines Mother Yuliet     Skin cancer Mother Yuliet 35        type? on nose    Hypertension Father      Kidney cancer Maternal Grandmother Anjana 50    Diabetes Maternal Grandmother Anjana     Myelodysplastic syndrome Paternal Grandmother Tanya 64    Brain cancer Paternal Grandfather  60    Ovarian cancer Neg Hx      Colon cancer Neg Hx      Eclampsia Neg Hx      Miscarriages / Stillbirths Neg Hx       labor Neg Hx      Stroke Neg Hx         Social History[2]    Review of Systems  Constitutional: No fevers, no chills, no change in weight  Eye/Vision: See HPI  Ear/Nose/Mouth/Throat: See HPI; cough, no runny nose, no sore throat  Respiratory: No shortness of breath, problems breathing  Cardiovascular: No chest pain  Gastrointestinal: See HPI, no diarrhea, no constipation  Genitourinary: No dysuria  Musculoskeletal: See HPI  Integumentary: No skin changes  Neurologic: See HPI  Psychiatric: Denies depression, denies anxiety, denies SI and HI.    Objective:     Vitals:    25 1521   BP: 115/75   Pulse: 85     Labs:    Office Visit on 2025   Component Date Value Ref Range Status    SARS Coronavirus 2 Antigen 2025 Negative  Negative, Presumptive Negative Final     Acceptable 2025 Yes   Final   Admission on 2025, Discharged on 2025   Component Date Value Ref Range Status    POC Preg Test, Ur 2025 Negative  Negative Final     Acceptable 2025 Yes   Final    Case Report 2025    Final                    Value:[place] [facility]                              Case: [id number]                                Authorizing Provider:  Krupa Oliveros MD       Collected:           2025 11:54 AM          Ordering Location:     [facility]    Received:          "   04/14/2025 12:42 PM                                 [place] ([facility])                                                         Pathologist:           Alex Vargas MD                                                        Specimens:   1) - Small Intestine, Duodenum - 1st Part, r/o celiac                                               2) - Stomach, Body, r/o H Pylori                                                           Clinical Information 04/14/2025    Final                    Value:Procedure:  EGD (ESOPHAGOGASTRODUODENOSCOPY)  Pre-op Diagnosis:  Abdominal pain, unspecified abdominal location [R10.9]  Post-op Diagnosis:  R10.9 - Abdominal pain, unspecified abdominal location [ICD-10-CM]      Final Diagnosis 04/14/2025    Final                    Value:1. Duodenum, biopsy:  Duodenal mucosa with no diagnostic histopathologic alterations  No morphologic evidence of gluten-sensitive enteropathy    2. Stomach, body, biopsy:  Gastric antral mucosa with reactive gastropathy  Gastric oxyntic mucosa with mild chronic inactive gastritis  No morphologic evidence of Helicobacter pylori on routine stain  Negative for intestinal metaplasia and dysplasia       Gross Description 04/14/2025    Final                    Value:1. Small Intestine, Duodenum - 1st Part: r/o celiac  MRN # on Epic Label:  [id number]  MRN # on Pathology Label:  [id number]    The specimen is received in formalin labeled "small bowel".  The specimen consists of multiple tan-yellow fragments of soft tissue measuring 0.5 x 0.3 x 0.2 cm in aggregate. The specimen is submitted entirely in cassette 1A.    TALITA Doty  Gross Technologist    Grossing was completed by Anaid Mora on 4/14/2025.    2. Stomach, Body: r/o H Pylori  MRN # on Epic Label:  [id number]  MRN # on Pathology Label:  [id number]    The specimen is received in formalin labeled "gastric".  The specimen consists of multiple tan-yellow fragments of soft tissue measuring 0.5 x 0.3 x 0.2 cm " "in aggregate. The specimen is submitted entirely in cassette 2A.    TALITA Doty  Gross Technologist    Grossing was completed by Anaid Mora on 4/14/2025.        Report Footnotes 04/14/2025    Final                    Value:Unless the case is a "gross only" or additional testing only, the final diagnosis for each specimen is based on a microscopic examination of appropriate tissue sections.      Performing Location 04/14/2025    Final                    Value:Grossing performed at [facility], [address] [place], [address]    Sign out performed at [facility], [address] [place], [address]     Lab Visit on 04/09/2025   Component Date Value Ref Range Status    H. pylori IgG Interp 04/09/2025 Negative  Negative Final    A negative result generally indicates that the patient has not been infected but does not always rule out acute H. pylori infection.    Protein Total 04/09/2025 7.8  6.0 - 8.4 gm/dL Final    Albumin 04/09/2025 4.1  3.5 - 5.2 g/dL Final    Bilirubin Total 04/09/2025 0.4  0.1 - 1.0 mg/dL Final    For infants and newborns, interpretation of results should be based   on gestational age, weight and in agreement with clinical   observations.    Premature Infant recommended reference ranges:   0-24 hours:  <8.0 mg/dL   24-48 hours: <12.0 mg/dL   3-5 days:    <15.0 mg/dL   6-29 days:   <15.0 mg/dL    Bilirubin Direct 04/09/2025 0.2  0.1 - 0.3 mg/dL Final    ALP 04/09/2025 95  40 - 150 unit/L Final    AST 04/09/2025 28  11 - 45 unit/L Final    ALT 04/09/2025 34  10 - 44 unit/L Final     3/12/25:  CBC WNL    3/13/25:  CMP WNL except ALT 48    4/9/25:  LFTs WNL    4/14/25:  hCG neg    I personally reviewed all current labs noted in today's chart.    Imaging:    No new neurological studies    Assessment:       ICD-10-CM ICD-9-CM    1. Migraine with aura and without status migrainosus, not intractable  G43.109 346.00 predniSONE (DELTASONE) 20 MG tablet    "   2. Migraine without aura and without status migrainosus, not intractable  G43.009 346.10          40 y.o. female with h/o Covid, migraine without aura who presents for follow up. On previous exam, she has convergence insufficiency. We discussed that baseline headaches and current headache meet criteria for migraine without aura but now also has migraine with aura more rarely. We discussed only needing abortive therapy at this time as headaches are not frequent enough for prophylactic therapy and they are triggered by her menstrual cycle. We discussed previously given that she does not notice convergence insufficiency with normal vision, do not need to further work it up at this time. Overall, her symptoms are worsening.    I am treating multiple chronic illnesses    Today's medical decision making was based on the number and complexity of problems addressed as documented in today's encounter, risks of complications due to prescription drug management as documented in today's encounter    Plan:     Start rizatriptan 10 mg at aura or headache onset (whichever occurs first) or night before menstrual cycle onset or night before last day of your menstrual cycle. You may take 2nd tab 2 hours later but no more than 2 tabs in 24 hours  Counseled on not to use estrogen based medications due to having migraines with aura and as a result are at increased risk for blood clots.  Discontinue sumatriptan    Jai Ferris MD  Sports Neurology       [1]   Current Outpatient Medications on File Prior to Visit   Medication Sig Dispense Refill    albuterol (PROVENTIL/VENTOLIN HFA) 90 mcg/actuation inhaler Inhale 1-2 puffs into the lungs every 6 (six) hours as needed for Wheezing. Rescue 18 g 0    azelastine (ASTELIN) 137 mcg (0.1 %) nasal spray 1 spray (137 mcg total) by Nasal route 2 (two) times daily. 30 mL 5    omeprazole (PRILOSEC) 40 MG capsule Take 1 capsule (40 mg total) by mouth every morning. 30 capsule 2    ondansetron  (ZOFRAN-ODT) 4 MG TbDL Dissolve 1 tablet (4 mg total) by mouth 2 (two) times daily. 60 tablet 3    triamcinolone (NASACORT) 55 mcg nasal inhaler 2 sprays by Nasal route once daily. 16.9 mL 5    [DISCONTINUED] predniSONE (DELTASONE) 20 MG tablet Take 2 tablets (40 mg total) by mouth once daily for 3 days, THEN 1 tablet (20 mg total) once daily for 2 days. 8 tablet 0    [DISCONTINUED] sumatriptan (IMITREX) 100 MG tablet Take 1 tab at headache onset. May take 2nd tab 2 hours later if needed but no more than 2 tabs in 24 hours. 9 tablet 2     No current facility-administered medications on file prior to visit.   [2]   Social History  Tobacco Use    Smoking status: Never     Passive exposure: Never    Smokeless tobacco: Never   Substance Use Topics    Alcohol use: Not Currently     Comment: 2 per year    Drug use: Never

## 2025-04-24 NOTE — PATIENT INSTRUCTIONS
Start rizatriptan 10 mg at aura or headache onset (whichever occurs first) or night before menstrual cycle onset or night before last day of your menstrual cycle. You may take 2nd tab 2 hours later but no more than 2 tabs in 24 hours  Counseled on not to use estrogen based medications due to having migraines with aura and as a result are at increased risk for blood clots.  Discontinue sumatriptan

## 2025-05-02 ENCOUNTER — OFFICE VISIT (OUTPATIENT)
Dept: DERMATOLOGY | Facility: CLINIC | Age: 40
End: 2025-05-02
Payer: COMMERCIAL

## 2025-05-02 DIAGNOSIS — L98.9 DERMATOSIS: Primary | ICD-10-CM

## 2025-05-02 PROCEDURE — 99999 PR PBB SHADOW E&M-EST. PATIENT-LVL III: CPT | Mod: PBBFAC,,, | Performed by: PHYSICIAN ASSISTANT

## 2025-05-02 RX ORDER — MOMETASONE FUROATE 1 MG/G
OINTMENT TOPICAL
Qty: 60 G | Refills: 1 | Status: SHIPPED | OUTPATIENT
Start: 2025-05-02

## 2025-05-02 NOTE — PATIENT INSTRUCTIONS
Dermatosis- vascular reaction to potentially a bug bite? vs other  -     mometasone (ELOCON) 0.1 % ointment; aaa bid. Not more than 2 weeks straight in same location. Avoid use on face and groin  Dispense: 60 g; Refill: 1    Pt educated that overuse of steroids can lead to skin thinning/atrophy, hypopigmentation, striae.       Consider bx in future if no resolution.     Follow up in about 4 weeks (around 5/30/2025).

## 2025-05-02 NOTE — PROGRESS NOTES
Subjective:      Patient ID:  Christie Bay is a 40 y.o. female who presents for   Chief Complaint   Patient presents with    Lesion     Back      Pt has spot on back.  Pt states she just noticed today. Doesn't remember being bit by anything. Has itchiness. Took benadryl.      Mom has a hx of skin cancer, but pt denies personal hx of skin cancer.      Review of Systems   Skin:  Negative for itching, rash and dry skin.       Objective:   Physical Exam   Constitutional: She appears well-developed and well-nourished. No distress.   Neurological: She is alert and oriented to person, place, and time. She is not disoriented.   Psychiatric: She has a normal mood and affect.   Skin:   Areas Examined (abnormalities noted in diagram):   Back Inspection Performed                  Diagram Legend     Erythematous scaling macule/papule c/w actinic keratosis       Vascular papule c/w angioma      Pigmented verrucoid papule/plaque c/w seborrheic keratosis      Yellow umbilicated papule c/w sebaceous hyperplasia      Irregularly shaped tan macule c/w lentigo     1-2 mm smooth white papules consistent with Milia      Movable subcutaneous cyst with punctum c/w epidermal inclusion cyst      Subcutaneous movable cyst c/w pilar cyst      Firm pink to brown papule c/w dermatofibroma      Pedunculated fleshy papule(s) c/w skin tag(s)      Evenly pigmented macule c/w junctional nevus     Mildly variegated pigmented, slightly irregular-bordered macule c/w mildly atypical nevus      Flesh colored to evenly pigmented papule c/w intradermal nevus       Pink pearly papule/plaque c/w basal cell carcinoma      Erythematous hyperkeratotic cursted plaque c/w SCC      Surgical scar with no sign of skin cancer recurrence      Open and closed comedones      Inflammatory papules and pustules      Verrucoid papule consistent consistent with wart     Erythematous eczematous patches and plaques     Dystrophic onycholytic nail with  subungual debris c/w onychomycosis     Umbilicated papule    Erythematous-base heme-crusted tan verrucoid plaque consistent with inflamed seborrheic keratosis     Erythematous Silvery Scaling Plaque c/w Psoriasis     See annotation      Assessment / Plan:        Dermatosis- vascular reaction to potentially a bug bite? vs other  -     mometasone (ELOCON) 0.1 % ointment; aaa bid. Not more than 2 weeks straight in same location. Avoid use on face and groin  Dispense: 60 g; Refill: 1    Pt educated that overuse of steroids can lead to skin thinning/atrophy, hypopigmentation, striae.       Consider bx in future if no resolution.     Follow up in about 4 weeks (around 5/30/2025).

## 2025-05-19 ENCOUNTER — OFFICE VISIT (OUTPATIENT)
Dept: INTERNAL MEDICINE | Facility: CLINIC | Age: 40
End: 2025-05-19
Payer: COMMERCIAL

## 2025-05-19 VITALS
HEIGHT: 66 IN | DIASTOLIC BLOOD PRESSURE: 68 MMHG | SYSTOLIC BLOOD PRESSURE: 110 MMHG | HEART RATE: 75 BPM | BODY MASS INDEX: 28.23 KG/M2 | WEIGHT: 175.69 LBS

## 2025-05-19 DIAGNOSIS — G25.0 ESSENTIAL TREMOR: ICD-10-CM

## 2025-05-19 DIAGNOSIS — Z00.00 ROUTINE HISTORY AND PHYSICAL EXAMINATION OF ADULT: Primary | ICD-10-CM

## 2025-05-19 PROBLEM — M25.539 PAIN IN WRIST: Status: RESOLVED | Noted: 2023-12-21 | Resolved: 2025-05-19

## 2025-05-19 PROCEDURE — 1159F MED LIST DOCD IN RCRD: CPT | Mod: CPTII,S$GLB,, | Performed by: INTERNAL MEDICINE

## 2025-05-19 PROCEDURE — 3078F DIAST BP <80 MM HG: CPT | Mod: CPTII,S$GLB,, | Performed by: INTERNAL MEDICINE

## 2025-05-19 PROCEDURE — 1160F RVW MEDS BY RX/DR IN RCRD: CPT | Mod: CPTII,S$GLB,, | Performed by: INTERNAL MEDICINE

## 2025-05-19 PROCEDURE — 99395 PREV VISIT EST AGE 18-39: CPT | Mod: S$GLB,,, | Performed by: INTERNAL MEDICINE

## 2025-05-19 PROCEDURE — 99999 PR PBB SHADOW E&M-EST. PATIENT-LVL III: CPT | Mod: PBBFAC,,, | Performed by: INTERNAL MEDICINE

## 2025-05-19 PROCEDURE — 3074F SYST BP LT 130 MM HG: CPT | Mod: CPTII,S$GLB,, | Performed by: INTERNAL MEDICINE

## 2025-05-19 PROCEDURE — 3008F BODY MASS INDEX DOCD: CPT | Mod: CPTII,S$GLB,, | Performed by: INTERNAL MEDICINE

## 2025-05-19 NOTE — PROGRESS NOTES
Subjective:       Patient ID: Christie Bay is a 40 y.o. female.    Chief Complaint:   Establish Care and Annual Exam    HPI - she feels well today.  Here to establish care after an urgent care visit earlier.  She is a never smoker, nondrinker.  She is sexually active with 1 female partner; her wife.  No illicit substance use She works as a  in our [employer] [place].  She has a tremor in her right hand that she would like to discuss    PMH:  Migraine headaches  Various orthopedic injuries    Meds:  Reviewed and reconciled in EPIC with patient during visit today.    Review of Systems   Constitutional:  Negative for fever.   HENT:  Negative for congestion.    Respiratory:  Negative for shortness of breath.    Cardiovascular:  Negative for chest pain.   Gastrointestinal:  Negative for abdominal pain.   Genitourinary:  Negative for difficulty urinating.   Musculoskeletal:  Negative for arthralgias.   Skin:  Negative for rash.   Neurological:  Positive for tremors.   Psychiatric/Behavioral:  Negative for sleep disturbance.        Objective:      Physical Exam  Vitals reviewed.   Constitutional:       Appearance: She is well-developed.      Comments: Well-appearing woman in no distress   HENT:      Head: Normocephalic and atraumatic.   Cardiovascular:      Rate and Rhythm: Normal rate and regular rhythm.      Heart sounds: Normal heart sounds. No murmur heard.     No friction rub. No gallop.   Pulmonary:      Effort: Pulmonary effort is normal. No respiratory distress.      Breath sounds: Normal breath sounds. No wheezing or rales.   Chest:      Chest wall: No tenderness.   Musculoskeletal:      Cervical back: Neck supple.   Lymphadenopathy:      Cervical: No cervical adenopathy.   Skin:     General: Skin is warm and dry.      Findings: No erythema.   Neurological:      General: No focal deficit present.      Mental Status: She is alert.      Comments: Slight essential tremor noted  to right hand   Psychiatric:         Mood and Affect: Mood normal.         Assessment:       1. Routine history and physical examination of adult    2. Essential tremor        Plan:       Christie was seen today for establish care and annual exam.    Diagnoses and all orders for this visit:    Routine history and physical examination of adult - healthy woman.  Labs up-to-date; immunizations done.  Continue present care and monitoring as appropriate for a woman her age    Essential tremor - new problem.  Offered reassurance.  We can try propranolol if necessary should this worsen.    RTC p.r.n., or in a year    CINTHYA Méndez MD MPH  Staff Internist

## 2025-06-04 ENCOUNTER — OFFICE VISIT (OUTPATIENT)
Dept: DERMATOLOGY | Facility: CLINIC | Age: 40
End: 2025-06-04
Payer: COMMERCIAL

## 2025-06-04 DIAGNOSIS — L98.9 DERMATOSIS: Primary | ICD-10-CM

## 2025-06-04 PROCEDURE — 99212 OFFICE O/P EST SF 10 MIN: CPT | Mod: S$GLB,,, | Performed by: PHYSICIAN ASSISTANT

## 2025-06-04 PROCEDURE — 1160F RVW MEDS BY RX/DR IN RCRD: CPT | Mod: CPTII,S$GLB,, | Performed by: PHYSICIAN ASSISTANT

## 2025-06-04 PROCEDURE — 1159F MED LIST DOCD IN RCRD: CPT | Mod: CPTII,S$GLB,, | Performed by: PHYSICIAN ASSISTANT

## 2025-06-04 PROCEDURE — 99999 PR PBB SHADOW E&M-EST. PATIENT-LVL III: CPT | Mod: PBBFAC,,, | Performed by: PHYSICIAN ASSISTANT

## 2025-06-05 ENCOUNTER — OFFICE VISIT (OUTPATIENT)
Dept: PRIMARY CARE CLINIC | Facility: CLINIC | Age: 40
End: 2025-06-05
Payer: COMMERCIAL

## 2025-06-05 VITALS
DIASTOLIC BLOOD PRESSURE: 84 MMHG | SYSTOLIC BLOOD PRESSURE: 117 MMHG | TEMPERATURE: 98 F | OXYGEN SATURATION: 97 % | HEIGHT: 66 IN | BODY MASS INDEX: 27.67 KG/M2 | WEIGHT: 172.19 LBS | HEART RATE: 100 BPM

## 2025-06-05 DIAGNOSIS — R05.8 POST-VIRAL COUGH SYNDROME: Primary | ICD-10-CM

## 2025-06-05 PROCEDURE — 99499 UNLISTED E&M SERVICE: CPT | Mod: S$GLB,,, | Performed by: INTERNAL MEDICINE

## 2025-06-05 RX ORDER — GABAPENTIN 100 MG/1
100 CAPSULE ORAL 3 TIMES DAILY
Qty: 90 CAPSULE | Refills: 0 | Status: SHIPPED | OUTPATIENT
Start: 2025-06-05 | End: 2025-07-05

## 2025-06-05 RX ORDER — MONTELUKAST SODIUM 10 MG/1
10 TABLET ORAL NIGHTLY
Qty: 30 TABLET | Refills: 0 | Status: SHIPPED | OUTPATIENT
Start: 2025-06-05 | End: 2025-07-05

## 2025-06-11 ENCOUNTER — OFFICE VISIT (OUTPATIENT)
Dept: URGENT CARE | Facility: CLINIC | Age: 40
End: 2025-06-11
Payer: COMMERCIAL

## 2025-06-11 VITALS
SYSTOLIC BLOOD PRESSURE: 109 MMHG | HEIGHT: 66 IN | DIASTOLIC BLOOD PRESSURE: 73 MMHG | HEART RATE: 80 BPM | BODY MASS INDEX: 27.32 KG/M2 | TEMPERATURE: 98 F | WEIGHT: 170 LBS | RESPIRATION RATE: 20 BRPM | OXYGEN SATURATION: 98 %

## 2025-06-11 DIAGNOSIS — J06.9 VIRAL URI WITH COUGH: Primary | ICD-10-CM

## 2025-06-11 LAB
CTP QC/QA: YES
CTP QC/QA: YES
MOLECULAR STREP A: NEGATIVE
SARS-COV+SARS-COV-2 AG RESP QL IA.RAPID: NEGATIVE

## 2025-06-11 PROCEDURE — 87651 STREP A DNA AMP PROBE: CPT | Mod: QW,S$GLB,,

## 2025-06-11 PROCEDURE — 99214 OFFICE O/P EST MOD 30 MIN: CPT | Mod: S$GLB,,,

## 2025-06-11 PROCEDURE — 87811 SARS-COV-2 COVID19 W/OPTIC: CPT | Mod: QW,S$GLB,,

## 2025-06-11 RX ORDER — BROMPHENIRAMINE MALEATE, PSEUDOEPHEDRINE HYDROCHLORIDE, AND DEXTROMETHORPHAN HYDROBROMIDE 2; 30; 10 MG/5ML; MG/5ML; MG/5ML
5 SYRUP ORAL EVERY 4 HOURS PRN
Qty: 118 ML | Refills: 0 | Status: SHIPPED | OUTPATIENT
Start: 2025-06-11 | End: 2025-06-21

## 2025-06-11 RX ORDER — VITAMIN A 3000 MCG
1 CAPSULE ORAL
Qty: 50 ML | Refills: 0 | Status: SHIPPED | OUTPATIENT
Start: 2025-06-11

## 2025-06-11 NOTE — PROGRESS NOTES
"Subjective:      Patient ID: Christie Bay is a 40 y.o. female.    Vitals:  height is 5' 6" (1.676 m) and weight is 77.1 kg (170 lb). Her oral temperature is 97.7 °F (36.5 °C). Her blood pressure is 109/73 and her pulse is 80. Her respiration is 20 and oxygen saturation is 98%.     Chief Complaint: Sore Throat and Cough    39 yo F here with 3 day hx of nasal congestion, sore throat, headache and cough.  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 Denies fever, nausea, SOB and wheezing.    Sore Throat   This is a new problem. The current episode started in the past 7 days (x3 days). The problem has been gradually worsening. The pain is worse on the right side. There has been no fever. The pain is at a severity of 6/10. The pain is moderate. Associated symptoms include congestion, coughing, headaches and a hoarse voice. Pertinent negatives include no ear discharge, ear pain (right ear has a full feeling to it), neck pain, shortness of breath, swollen glands or vomiting. Treatments tried: dayquil and nyquil. The treatment provided moderate relief.   Cough  This is a new problem. The current episode started 1 to 4 weeks ago (x 3 week). The problem has been gradually worsening. The cough is Productive of sputum (green mucus coming up). Associated symptoms include headaches, postnasal drip and a sore throat. Pertinent negatives include no ear congestion, ear pain (right ear has a full feeling to it), fever, nasal congestion, shortness of breath or wheezing. The symptoms are aggravated by lying down. Treatments tried: dayquill and nitequill. The treatment provided moderate relief.       Constitution: Negative for fever.   HENT:  Positive for congestion, postnasal drip and sore throat. Negative for ear pain (right ear has a full feeling to it) and ear discharge.    Neck: Negative for neck pain.   Respiratory:  Positive for cough. Negative for shortness of breath " and wheezing.    Gastrointestinal:  Negative for vomiting.   Neurological:  Positive for headaches.      Objective:     Physical Exam   Constitutional: She is oriented to person, place, and time.  Non-toxic appearance. She appears ill. No distress. normal  HENT:   Head: Normocephalic and atraumatic.   Ears:   Right Ear: Tympanic membrane, external ear and ear canal normal.   Left Ear: Tympanic membrane, external ear and ear canal normal.   Nose: Rhinorrhea and congestion present.   Mouth/Throat: Posterior oropharyngeal erythema present. No oropharyngeal exudate.   Eyes: Conjunctivae are normal.   Cardiovascular: Normal rate, regular rhythm and normal heart sounds.   Pulmonary/Chest: Effort normal and breath sounds normal.   Abdominal: Normal appearance. Soft.   Neurological: She is alert and oriented to person, place, and time.   Skin: Skin is warm, dry and not diaphoretic. Capillary refill takes less than 2 seconds.   Psychiatric: Her behavior is normal.   Nursing note and vitals reviewed.      Assessment:     1. Viral URI with cough      Results for orders placed or performed in visit on 06/11/25   SARS Coronavirus 2 Antigen, POCT Manual Read    Collection Time: 06/11/25  8:30 AM   Result Value Ref Range    SARS Coronavirus 2 Antigen Negative Negative, Presumptive Negative     Acceptable Yes    POCT Strep A, Molecular    Collection Time: 06/11/25  8:45 AM   Result Value Ref Range    Molecular Strep A, POC Negative Negative     Acceptable Yes        Plan:       Viral URI with cough  -     SARS Coronavirus 2 Antigen, POCT Manual Read  -     POCT Strep A, Molecular  -     brompheniramine-pseudoeph-DM (BROMFED DM) 2-30-10 mg/5 mL Syrp; Take 5 mLs by mouth every 4 (four) hours as needed (upper resp sx).  Dispense: 118 mL; Refill: 0  -     sodium chloride (SALINE NASAL) 0.65 % nasal spray; 1 spray by Nasal route as needed for Congestion.  Dispense: 50 mL; Refill: 0      Patient Instructions    UPPER RESPIRATORY INFECTIONS    An upper respiratory infection can affect your nose, throat, ears, and sinuses.  They are usually caused by viruses.    URIs are contagious and may be spread to others through coughing, sneezing, or close contact. The virus can also stay on objects and surfaces. You can become infected if you touch the object or surface and then touch your eyes, mouth, or nose.    Viruses do not get better with antibiotics. Most people get better in 7 to 14 days. You may continue to cough for 2 to 3 weeks.      Criteria for antibiotics include:  Upper respiratory infections lasting longer than 10-14 days without improvement.  New or worsening symptoms after 5 to 7 days  Worsening symptoms after initial improvement.   Co-existing infection such as Acute Otitis Media (ear infection).     The use of antibiotics for nonbacterial upper respiratory infections has resulted in a severe problem with the emergence of bacteria which are resistant to multiple forms of antibiotics, and some bacteria are currently only treatable with intravenous antibiotics.    The following are suggestions to help you with upper respiratory symptoms.    Body Aches/Pains/Fever  For patients who are not allergic to and are not on anticoagulants, you can alternate Tylenol every 4 hours and Motrin every 6 hours for fever above 100.4F and/or pain.  For patients who are allergic or intolerant to NSAIDS, have gastritis, gastric ulcers, or history of GI bleeds, are pregnant, or are on anticoagulant therapy, you can take Tylenol every 4 hours as needed for fever above 100.4F and/or pain.    CONGESTION:    Nasal Saline   Nasal saline is available over the counter. There are several different commercial preparations such as Ocean spray and Ayr spray. There is no limit on the use of Nasal saline. Saline is used by snorting the mist up into the nose then later gently blowing the nose to get rid of any secretions that it has  loosened.    Nasal irrigation   Flushing the nose and sinuses with a saline solution several times per day can decrease pain associated with congestion and shorten the duration of symptoms.     Decongestant Nasal Sprays  Over-the-counter decongestant nasal spray such as Afrin, may be helpful as an initial step in treating upper respiratory infections. This spray can be used for up to approximately 3 days and is used no more than twice per day. Topical nasal decongestant spray for longer than 5 days will result in a physical addiction, in which the nasal lining will become significantly swollen and irritated until the spray is used again. They May also cause elevated blood pressure.    Nasal Steroids  Nasal steroids, such as Flonase, can be beneficial and help reduce swelling inside the nose. These drugs have few side effects and relieve symptoms in most people. Unfortunately, they do not begin to work for 2-3 days and do not reach their maximum benefit for approximately 2-3 weeks.   There are several nasal steroids available by prescription as well as a few that can be purchased without a prescription (over the counter). These drugs are all effective but differ in how frequently they must be used and how much they cost.    Nasal anticholinergics  Ipratropium bromide (nasal spray) is available by prescription and can be very effective in decreasing the symptom of runny nose and other related symptoms (eg, post-nasal drainage, sore throat). These sprays, like all medications, can interact with other medications, so it is important that your complete medication list be reviewed by your physician before you take this medication.    If you develop a bloody nose, stop using the medication immediately.    Oral Decongestant  Use pseudoephedrine (behind the counter) for sinus pressure and congestion- Pseudoephedrine 30 mg up to 240 mg /day. Common brands include Sudafed, Zephrex-D Wal-phed.  Warning: It can raise your blood  pressure and give you palpitations, avoid with history of high blood pressure, palpitations, and severe cardiac disease.  Coricidin HBP is okay to use if you have high blood pressure.     Mucous Thinners/Decongestant Combination   Mucous thinners and decongestants are used to shrink down the tissues and promote sinus drainage. There are multiple prescription and over-the-counter medications available. A mucous thinner will tend to be drying unless you are also drinking plenty of water when taking these. If you have high blood pressure, it is very important to monitor your pressure while on decongestants. The mucous thinner/decongestant combinations are typically given twice per day. However, some people will be unable to tolerate these at night and should only take them once per day.    CLEAR RUNNY NOSE AND ALLERGIC RHINITIS:  Use an antihistamine to help dry you out or nasal anticholinergics as mentioned above.     Antihistamines  Antihistamines are available both over the counter and as a prescription. There are also various decongestant and antihistamine combinations available such as Claritin, Allegra, and Zyrtec. It is best to take any antihistamine-decongestant combination in the morning to avoid insomnia. Zyrtec should probably be taken at night, to reduce the chance of sleepiness during the daytime. If there is a significant infection present and secretions are already thickened, it is recommended to discontinue antihistamines and use a mucous thinner/decongestant combination.    Oral Steroids  Oral steroids can be used with more severe infections. Often, they are the only medications that will reduce the symptoms of pressure and allow the nasal sinuses to drain. These are best taken on a full stomach and earlier in the day is better. They may give you some irritability, stomach upset, or hyperactivity. This can also interfere with sleep.     A person who has high blood pressure or diabetes should be very  careful and monitor their blood pressure or blood glucose while taking steroids. Steroids can have multiple side effects especially when taken long-term. Short-term doses are usually very well tolerated and effective in controlling the symptoms associated with sinus infections and severe allergies. The use of steroids for greater than approximately seven days requires a tapering down to discontinue them. You should not abruptly stop your steroid if you have been taking the same dose for greater than 7 days.     Maintain adequate hydration - Rest and keep yourself/patient well hydrated. For adults, it is recommended to drink at least 8 glasses of water daily.  This may help thin secretions and soothe the respiratory mucosa.     SORE THROAT:  Perform warm, saltwater gargles (1/2 tsp salt to 1 cup warm water) to help reduce inflammation and throat discomfort. Chloraseptic sprays and throat lozenges will also help with your throat pain.    COUGH:  A viral cough may linger for 3 to 4 weeks but should steadily improve over time. Coughing is the body's natural way to clear mucus and help get rid of bacteria and viruses. Therefore, cough suppressants are usually not recommended.      Use Mucinex (guaifenesin) up to 2400mg/day to help clear and break up/loosen mucus/congestion from the chest when you have a cold or flu.      Common cough suppressants include the ingredient dextromethorphan or DM, (such as Mucinex DM) available over the counter and can be used for cough to stop the tickle in the back of your throat.      ? Honey may be beneficial, especially on nocturnal cough 1 to 2 teaspoons can be taken straight or diluted in tea, juice or other liquid.    The antioxidants in honey are an important contributor to its decongestant properties. Darker honey contains more antioxidants. Buckwheat and avocado honey are particularly good choices. If these honeys are not available in your area, choose the darkest honey you can  find.    It is important to follow up for Re-evaluation if new or worsening symptoms develop or symptoms exceed the expected duration of common cold.     Worsening or persistent symptoms may indicate the development of complications or the need to consider a diagnosis other than the common cold requiring an antibiotic. (eg, acute bacterial sinusitis, pneumonia, pertussis).    Go to Emergency Department or call 911 if you develop new or worsening symptoms including but not limited to:  Trouble breathing.  New or worsening chest discomfort.  Feel confused or disoriented.  Vomiting and can't keep liquids down.  Develop signs of fluid loss, such as dark-colored urine and muscle cramps.    **You must understand that you have received Urgent Care treatment only and that you may be released before all your medical problems are known or treated. You, the patient, are responsible to arrange for follow-up care as instructed.

## 2025-06-23 ENCOUNTER — OFFICE VISIT (OUTPATIENT)
Dept: INTERNAL MEDICINE | Facility: CLINIC | Age: 40
End: 2025-06-23
Payer: COMMERCIAL

## 2025-06-23 VITALS
BODY MASS INDEX: 27 KG/M2 | HEART RATE: 69 BPM | TEMPERATURE: 98 F | OXYGEN SATURATION: 98 % | DIASTOLIC BLOOD PRESSURE: 60 MMHG | HEIGHT: 66 IN | WEIGHT: 168 LBS | SYSTOLIC BLOOD PRESSURE: 106 MMHG

## 2025-06-23 DIAGNOSIS — K64.9 HEMORRHOIDS, UNSPECIFIED HEMORRHOID TYPE: ICD-10-CM

## 2025-06-23 DIAGNOSIS — R09.82 POSTNASAL DRIP: Primary | ICD-10-CM

## 2025-06-23 PROCEDURE — 3074F SYST BP LT 130 MM HG: CPT | Mod: CPTII,S$GLB,, | Performed by: INTERNAL MEDICINE

## 2025-06-23 PROCEDURE — 3008F BODY MASS INDEX DOCD: CPT | Mod: CPTII,S$GLB,, | Performed by: INTERNAL MEDICINE

## 2025-06-23 PROCEDURE — 1160F RVW MEDS BY RX/DR IN RCRD: CPT | Mod: CPTII,S$GLB,, | Performed by: INTERNAL MEDICINE

## 2025-06-23 PROCEDURE — 3078F DIAST BP <80 MM HG: CPT | Mod: CPTII,S$GLB,, | Performed by: INTERNAL MEDICINE

## 2025-06-23 PROCEDURE — 99214 OFFICE O/P EST MOD 30 MIN: CPT | Mod: S$GLB,,, | Performed by: INTERNAL MEDICINE

## 2025-06-23 PROCEDURE — 1159F MED LIST DOCD IN RCRD: CPT | Mod: CPTII,S$GLB,, | Performed by: INTERNAL MEDICINE

## 2025-06-23 PROCEDURE — 99999 PR PBB SHADOW E&M-EST. PATIENT-LVL IV: CPT | Mod: PBBFAC,,, | Performed by: INTERNAL MEDICINE

## 2025-06-23 NOTE — PROGRESS NOTES
Subjective:       Patient ID: Christie Bay is a 40 y.o. female.    Chief Complaint:   Sinusitis, Nasal Congestion, Sore Throat, and Cough    HPI - urgent care visit for an established patient of mine.  She states she has been having trouble with her sinuses for the past month.  She describes post nasal drip with cough, drainage and an intermittent sore throat.  She occasionally gets hoarseness.  She has some pressure in her sinuses.  This has been longstanding and intermittent for her.  She has seen ENT in the past.  She is on sinus washes, antihistamines, and 2 nasal sprays.  Overall, she states that her symptom complex is improving, but she wants to make sure nothing more serious is going on.  She would like to see Colorectal surgery for internal hemorrhoids, which can be painful for her.     PMH:  Chronic sinus congestion with postnasal drip  Migraine headaches  Various orthopedic injuries     Meds:  Reviewed and reconciled in EPIC with patient during visit today.     Review of Systems   Constitutional:  Negative for fever.   HENT:  Positive for congestion, postnasal drip, sinus pressure, sore throat and voice change.    Respiratory:  Positive for cough.    Cardiovascular:  Negative for chest pain.   Gastrointestinal:  Positive for rectal pain. Negative for abdominal pain and blood in stool.   Genitourinary:  Negative for difficulty urinating.   Musculoskeletal:  Negative for arthralgias.   Skin:  Negative for rash.   Neurological:  Negative for headaches.   Psychiatric/Behavioral:  Negative for sleep disturbance.        Objective:      Physical Exam  Vitals reviewed.   Constitutional:       Appearance: She is well-developed.   HENT:      Head: Normocephalic and atraumatic.      Right Ear: Tympanic membrane and ear canal normal. There is no impacted cerumen.      Left Ear: Tympanic membrane and ear canal normal. There is no impacted cerumen.      Mouth/Throat:      Mouth: Mucous membranes are  moist.      Pharynx: No posterior oropharyngeal erythema.      Comments: Visible postnasal drip  Cardiovascular:      Rate and Rhythm: Normal rate and regular rhythm.      Heart sounds: Normal heart sounds. No murmur heard.     No friction rub. No gallop.   Pulmonary:      Effort: Pulmonary effort is normal. No respiratory distress.      Breath sounds: Normal breath sounds. No wheezing or rales.   Chest:      Chest wall: No tenderness.   Skin:     General: Skin is warm and dry.      Findings: No erythema.   Neurological:      General: No focal deficit present.      Mental Status: She is alert.   Psychiatric:         Mood and Affect: Mood normal.         Assessment:       1. Postnasal drip    2. Hemorrhoids, unspecified hemorrhoid type        Plan:       Christie was seen today for sinusitis, nasal congestion, sore throat and cough.    Diagnoses and all orders for this visit:    Postnasal drip - improving?  We discussed the importance of 3 part therapy and consistency with this.  Shift nasal steroid to Flonase.  Try a different antihistamine.  If not better in a few days, she should follow up with ENT    Hemorrhoids, unspecified hemorrhoid type - new problem.  I will ask Colorectal surgery to evaluate  -     Ambulatory referral/consult to Colorectal Surgery; Future    RTC p.r.n.    CINTHYA Méndez MD MPH  Staff Internist

## 2025-06-27 ENCOUNTER — TELEPHONE (OUTPATIENT)
Dept: SURGERY | Facility: CLINIC | Age: 40
End: 2025-06-27
Payer: COMMERCIAL

## 2025-06-30 ENCOUNTER — TELEPHONE (OUTPATIENT)
Facility: CLINIC | Age: 40
End: 2025-06-30
Payer: COMMERCIAL

## 2025-06-30 ENCOUNTER — OFFICE VISIT (OUTPATIENT)
Dept: SURGERY | Facility: CLINIC | Age: 40
End: 2025-06-30
Payer: COMMERCIAL

## 2025-06-30 VITALS
SYSTOLIC BLOOD PRESSURE: 111 MMHG | HEART RATE: 73 BPM | BODY MASS INDEX: 27.99 KG/M2 | DIASTOLIC BLOOD PRESSURE: 72 MMHG | WEIGHT: 174.19 LBS | HEIGHT: 66 IN

## 2025-06-30 DIAGNOSIS — K64.9 HEMORRHOIDS, UNSPECIFIED HEMORRHOID TYPE: ICD-10-CM

## 2025-06-30 PROCEDURE — 99999 PR PBB SHADOW E&M-EST. PATIENT-LVL III: CPT | Mod: PBBFAC,,, | Performed by: SURGERY

## 2025-06-30 NOTE — TELEPHONE ENCOUNTER
Spoke to Pt about scheduling an appt. Pt is scheduled for tomorrow. Pt was informed that provider is leaving.

## 2025-06-30 NOTE — PROGRESS NOTES
CRS Office Visit History and Physical    Referring Md:   Janak Méndez Ii, Md  [address] [place] [address]    SUBJECTIVE:     Chief Complaint: hemorrhoids    History of Present Illness:  The patient is a new patient to this practice.   Course is as follows:  Christie Bay is a 40 y.o. female presents with hemorrhoids.  She reports that they have been present for some time.  Worsened with pregnancy.  She is currently in school and reports some constipation with increased stress.  She takes miralax as needed.  Does have rectal pressure and occasional bleeding with bowel movements.  She has previously had banding.     Last Colonoscopy: NA    Review of patient's allergies indicates:   Allergen Reactions    Codeine Anaphylaxis    Demerol [meperidine] Anaphylaxis    Morphine Anaphylaxis       Past Medical History:   Diagnosis Date    GERD (gastroesophageal reflux disease)     History of shingles     Migraine     Migraine headache     Ulcer      Past Surgical History:   Procedure Laterality Date    CHOLECYSTECTOMY      ESOPHAGOGASTRODUODENOSCOPY N/A 2025    Procedure: EGD (ESOPHAGOGASTRODUODENOSCOPY);  Surgeon: Krupa Oliveros MD;  Location: [facility] ENDOSCOPY;  Service: Gastroenterology;  Laterality: N/A;  molina/veena/benedict   Precall complete;patient confirmed;MB    KNEE SURGERY Left     SHOULDER SURGERY Right      Family History   Problem Relation Name Age of Onset    Cancer Mother Yuliet         skin    Migraines Mother Yuliet     Hypertension Father      Kidney cancer Maternal Grandmother Anjana 50    Diabetes Maternal Grandmother Anjana     Myelodysplastic syndrome Paternal Grandmother Tanya 64    Brain cancer Paternal Grandfather  60    Ovarian cancer Neg Hx      Colon cancer Neg Hx      Eclampsia Neg Hx      Miscarriages / Stillbirths Neg Hx       labor Neg Hx      Stroke Neg Hx       Social History[1]     Review of Systems:  Review of Systems   All  "other systems reviewed and are negative.    OBJECTIVE:     Vital Signs (Most Recent)  /72 (BP Location: Left arm, Patient Position: Sitting)   Pulse 73   Ht 5' 5.98" (1.676 m)   Wt 79 kg (174 lb 2.6 oz)   LMP 06/04/2025 (Approximate)   BMI 28.12 kg/m²     Physical Exam:  General: 40 y.o. female in no distress   Neuro: alert and oriented x 4.  Moves all extremities.     HEENT: normocephalic, atraumatic, PERRL, EOMI   Respiratory: respirations are even and unlabored  Cardiac: regular rate and rhythm  Abdomen: soft, NTND  Extremities: Warm dry and intact  Skin: no rashes  Anorectal: small external hemorrhoids, CHELY with intact tone, no masses or blood     Anoscopy:   Verbal consent obtained. A lubricated anoscope was inserted and circumferential inspection performed.  There were grade II internal hemorrhoids noted, most prominent in the right anterolateral position.  The scope was withdrawn.     Labs: NA    Imaging: NA      ASSESSMENT/PLAN:     Diagnoses and all orders for this visit:    Hemorrhoids, unspecified hemorrhoid type  -     Ambulatory referral/consult to Colorectal Surgery        40 y.o. female with internal and external hemorrhoids    - start daily fiber supplement, recommend benefiber 2 tsp daily  - can continue to use miralax as needed  - anusol suppositories not covered under patient's insurance plan, recommend Calmol4 or preparation H suppositories twice daily for 10 days   - patient has previously had hemorrhoid banding, given small external disease could consider repeat banding if no improvement   - follow up in 6-8 weeks if ongoing symptoms     Betty Nicole MD, FACS, FASCRS  Staff Surgeon  Colon & Rectal Surgery           [1]  Social History  Tobacco Use    Smoking status: Never     Passive exposure: Never    Smokeless tobacco: Never   Substance Use Topics    Alcohol use: Not Currently     Comment: 2 per year    Drug use: Never   "

## 2025-06-30 NOTE — TELEPHONE ENCOUNTER
Copied from CRM #[id number]. Topic: Appointments - Appointment Access  >> Jun 30, 2025  4:41 PM Sudheer wrote:  Pt is calling in ref to rescheduling cancelled follow up appt. Because kids had the flu. Patient Requesting Call Back @ [phone]

## 2025-07-01 ENCOUNTER — OFFICE VISIT (OUTPATIENT)
Dept: SPORTS MEDICINE | Facility: CLINIC | Age: 40
End: 2025-07-01
Payer: COMMERCIAL

## 2025-07-01 ENCOUNTER — OFFICE VISIT (OUTPATIENT)
Facility: CLINIC | Age: 40
End: 2025-07-01
Payer: COMMERCIAL

## 2025-07-01 VITALS — DIASTOLIC BLOOD PRESSURE: 79 MMHG | HEART RATE: 63 BPM | SYSTOLIC BLOOD PRESSURE: 116 MMHG

## 2025-07-01 DIAGNOSIS — G43.101 MIGRAINE WITH AURA AND WITH STATUS MIGRAINOSUS, NOT INTRACTABLE: Primary | ICD-10-CM

## 2025-07-01 DIAGNOSIS — G43.009 MIGRAINE WITHOUT AURA AND WITHOUT STATUS MIGRAINOSUS, NOT INTRACTABLE: ICD-10-CM

## 2025-07-01 DIAGNOSIS — G43.109 MIGRAINE WITH AURA AND WITHOUT STATUS MIGRAINOSUS, NOT INTRACTABLE: Primary | ICD-10-CM

## 2025-07-01 DIAGNOSIS — Z51.81 MEDICATION MONITORING ENCOUNTER: ICD-10-CM

## 2025-07-01 PROCEDURE — 99999 PR PBB SHADOW E&M-EST. PATIENT-LVL III: CPT | Mod: PBBFAC,,, | Performed by: PSYCHIATRY & NEUROLOGY

## 2025-07-01 PROCEDURE — 99214 OFFICE O/P EST MOD 30 MIN: CPT | Mod: S$GLB,,, | Performed by: PSYCHIATRY & NEUROLOGY

## 2025-07-01 PROCEDURE — 3078F DIAST BP <80 MM HG: CPT | Mod: CPTII,S$GLB,, | Performed by: PSYCHIATRY & NEUROLOGY

## 2025-07-01 PROCEDURE — 1159F MED LIST DOCD IN RCRD: CPT | Mod: CPTII,S$GLB,, | Performed by: PSYCHIATRY & NEUROLOGY

## 2025-07-01 PROCEDURE — 99999 PR PBB SHADOW E&M-EST. PATIENT-LVL I: CPT | Mod: PBBFAC,,, | Performed by: ORTHOPAEDIC SURGERY

## 2025-07-01 PROCEDURE — 3074F SYST BP LT 130 MM HG: CPT | Mod: CPTII,S$GLB,, | Performed by: PSYCHIATRY & NEUROLOGY

## 2025-07-01 PROCEDURE — 1160F RVW MEDS BY RX/DR IN RCRD: CPT | Mod: CPTII,S$GLB,, | Performed by: PSYCHIATRY & NEUROLOGY

## 2025-07-01 RX ORDER — KETOROLAC TROMETHAMINE 30 MG/ML
30 INJECTION, SOLUTION INTRAMUSCULAR; INTRAVENOUS
Status: COMPLETED | OUTPATIENT
Start: 2025-07-01 | End: 2025-07-01

## 2025-07-01 RX ORDER — RIMEGEPANT SULFATE 75 MG/75MG
75 TABLET, ORALLY DISINTEGRATING ORAL DAILY PRN
Qty: 8 TABLET | Refills: 2 | Status: SHIPPED | OUTPATIENT
Start: 2025-07-01

## 2025-07-01 RX ORDER — METHYLPREDNISOLONE 4 MG/1
TABLET ORAL
Qty: 21 EACH | Refills: 0 | Status: SHIPPED | OUTPATIENT
Start: 2025-07-01

## 2025-07-01 RX ADMIN — KETOROLAC TROMETHAMINE 30 MG: 30 INJECTION, SOLUTION INTRAMUSCULAR; INTRAVENOUS at 02:07

## 2025-07-01 NOTE — PROGRESS NOTES
Chief Complaint: Headache    Subjective:     History of Present Illness    Referring Provider: Self Referral  Accompanied by: No pain     09/16/2022: Christie Bay is a 37 y.o. female with h/o Covid who presents for headache evaluation. She states that she will get a headache 2 days prior to her menstrual cycle but most recently headache was not related to her menstrual cycle and lasted about a week. Headaches started after her gall bladder surgery in 12/2008. Typically, headaches are once a month as above with menstrual cycle, last 2-3 days, right forehead to above right ear that wraps around to right face and behind right eye, pressure. Typically, she has associated photophobia, nausea. She denies associated weakness, numbness, tingling, phonophobia, vomiting, change in vision, aura. Sometimes typical headaches stop her ADLs. She states that BC powder is the only thing seems to take the edge off the first day and resolves the headache the 2nd day. Of note, she had a stomach ulcer in grad school. She has not tried prescription abortive therapy or Excedrin. She has tried Tylenol, ibuprofen. She has not done prophylactic headache medication. She is not currently pregnant.      The unusual headache started on 9/10/22 in the evening, denies an inciting event (injury, medication change), not currently near her menstrual cycle, left forehead to left side to left occipital to base of left skull to behind left eye and on day 3 or 4 of the headache is behind both eyes, constant with varying intensity, pressure, this morning is dull roar. She denies neck pain or recent neck injury. She has associated photophobia, nausea. She denies associated phonophobia, weakness, numbness, tingling, change in vision, dizziness, aura. She denies having had a headache like this before. She has been sleeping decently and wakes up rested. She denies snoring and apnea. She denies a positional component and vasal vagal maneuvers  do not significantly worsen headaches. Headache woke her up once and denies waking up with headaches. Mood is fine. She has tried BC powder, ibuprofen, Tylenol and none of it has helped.     04/24/2025: Christie Bay is a 40 y.o. female with h/o Covid, migraine without aura who presents for follow up. On last visit, prescribed medrol dose pack and counseled on Excedrin and sumatriptan. She states she is trying to get a refill for the sumatriptan. Headaches are occurring more frequently and occur 2 weeks before period and again a day after her period, left eye to left temple, squeezing, lasts 3 days, sumatriptan helps and would cause headache to last 1-2 days and makes her nauseated so taking Zofran from PCP and was taking BC powder but caused GI issue so off of it now. She has associated photophobia, nausea, left eye blurred vision, aura. On 3/11/25, she had kaleidescope vision in left eye followed by vision loss in left eye and lasted 20 minutes and then a mild headache occurred that was base of skull and right sided and Tylenol helped and headache lasted 1 day, occurred once previously in life but was shorter in duration. She denies neck pain. She is sleeping well and headache will wake her up if off her medication. She wakes up rested. Mood is decent. Medrol helped from last visit and denies side effects other than feeling jittery. She tried Excedrin last month when stopped BC powder and worked okay. She denies other side effects to sumatriptan. She had EGD last week to check gut health.    07/01/2025: Christie Bay is a 40 y.o. female with h/o Covid, migraine without aura who presents for follow up. On last visit, started rizatriptan and counseled on estrogen and discontinued sumatriptan. She has had 4 headaches total since last visit, are now occurring after cycle except current headache is before cycle, last 2 days, in left eye and left side, stabbing, pressure, improves when  lays on left side of head. She denies neck pain but sometimes has base of skull pain. She has associated photophobia, nausea, lightheadedness, left eye blurred vision. She denies associated phonophobia, weakness, numbness, tingling, vomiting, aura. Headaches wake her up sometimes and wakes up rested. Mood is fine. She stopped sumatriptan. She states the longest it took for rizatriptan to help was 1 hour not counting her current headache, where she has currently taken 2 doses since yesterday with no resolution but has helped. Rizatriptan causes nausea so has to take Zofran. She has noticed she will get squeezing in chest after takes rizatriptan.     Medications Ordered Prior to Encounter[1]    Review of patient's allergies indicates:   Allergen Reactions    Codeine Anaphylaxis    Demerol [meperidine] Anaphylaxis    Morphine Anaphylaxis       Family History   Problem Relation Name Age of Onset    Cancer Mother Yuliet         skin    Migraines Mother Yuliet     Hypertension Father      Kidney cancer Maternal Grandmother Anjana 50    Diabetes Maternal Grandmother Anjana     Myelodysplastic syndrome Paternal Grandmother Tanya 64    Brain cancer Paternal Grandfather  60    Ovarian cancer Neg Hx      Colon cancer Neg Hx      Eclampsia Neg Hx      Miscarriages / Stillbirths Neg Hx       labor Neg Hx      Stroke Neg Hx         Social History[2]    Review of Systems  Constitutional: No fevers, no chills, no change in weight  Eye/Vision: See HPI  Ear/Nose/Mouth/Throat: See HPI; no cough, no runny nose, no sore throat  Respiratory: No shortness of breath, no problems breathing  Cardiovascular: See HPI  Gastrointestinal: See HPI, no diarrhea, no constipation  Genitourinary: No dysuria  Musculoskeletal: See HPI  Integumentary: No skin changes  Neurologic: See HPI  Psychiatric: Denies depression, denies anxiety, denies SI and HI.    Objective:     Vitals:    25 1038   BP: 116/79   Pulse: 63     Labs:    Office Visit on  06/11/2025   Component Date Value Ref Range Status    SARS Coronavirus 2 Antigen 06/11/2025 Negative  Negative, Presumptive Negative Final     Acceptable 06/11/2025 Yes   Final    Molecular Strep A, POC 06/11/2025 Negative  Negative Final     Acceptable 06/11/2025 Yes   Final     I personally reviewed all current labs noted in today's chart.    Imaging:    No new studies    Assessment:       ICD-10-CM ICD-9-CM    1. Migraine with aura and without status migrainosus, not intractable  G43.109 346.00       2. Migraine without aura and without status migrainosus, not intractable  G43.009 346.10       3. Medication monitoring encounter  Z51.81 V58.83          40 y.o. female with h/o Covid, migraine without aura who presents for follow up. On previous exam, she has convergence insufficiency. We discussed previously that baseline headaches and current headache meet criteria for migraine without aura but now also has migraine with aura more rarely. We discussed previously only needing abortive therapy at this time as headaches are not frequent enough for prophylactic therapy. Headaches have previously been triggered by her menstrual cycle but not currently. We discussed previously given that she does not notice convergence insufficiency with normal vision, do not need to further work it up at this time. We discussed that she should no longer take triptans due to potential cardiac side effect she has experienced. With triptans contraindicated now and with suboptimal benefit to 2 of them, she meets criteria to try CGRPs. We discussed that Nurtec is still a new medication and that we are still learning about side effects. We discussed that the labeled side effects are abdominal pain, acid reflex, nausea, skin rash, hypersensitivity reaction, and shortness of breath. We discussed would need to stop it for pregnancy and family planning and would advise against it if breastfeeding. Counseled the  patient that steroids suppress the immune response, which means that they are at increased risk for juan pablo bacterial or viral infections including COVID19 and if they were to become infected, they may have a more severe disease course. We discussed that any form of steroids including oral or injectable should not be taken within 2 weeks of COVID19 vaccination/booster. The patient has elected to take steroids. The patient's last COVID19 vaccination/booster was not within 2 weeks . Overall, her symptoms are persistent and will do steroids in interim as wait for insurance approval for Nurtec.     I am treating multiple chronic illnesses    Today's medical decision making was based on the number and complexity of problems addressed as documented in today's encounter, risks of complications due to prescription drug management as documented in today's encounter    Plan:     Start Nurtec 75 mg at headache onset. Do not take more than 1 pill in 24 hours  Counseled on not to use estrogen based medications due to having migraines with aura and as a result are at increased risk for blood clots.  Discontinue rizatriptan  Medrol dose pack prescribed. Take as instructed on package insert.    Jai Ferris MD  Sports Neurology       [1]   Current Outpatient Medications on File Prior to Visit   Medication Sig Dispense Refill    albuterol (PROVENTIL/VENTOLIN HFA) 90 mcg/actuation inhaler Inhale 1-2 puffs into the lungs every 6 (six) hours as needed for Wheezing. Rescue 18 g 0    azelastine (ASTELIN) 137 mcg (0.1 %) nasal spray 1 spray (137 mcg total) by Nasal route 2 (two) times daily. 30 mL 5    ondansetron (ZOFRAN-ODT) 4 MG TbDL Dissolve 1 tablet (4 mg total) by mouth 2 (two) times daily. 60 tablet 3    sodium chloride (SALINE NASAL) 0.65 % nasal spray 1 spray by Nasal route as needed for Congestion. 50 mL 0    triamcinolone (NASACORT) 55 mcg nasal inhaler 2 sprays by Nasal route once daily. 16.9 mL 5    [DISCONTINUED]  rizatriptan (MAXALT) 10 MG tablet Take 1 tab at headache/aura onset or night before menstrual cycle or night before last day of menstrual cycle. May take 2nd tab 2 hours later. No more than 2 tabs in 24 hours. 6 tablet 2     No current facility-administered medications on file prior to visit.   [2]   Social History  Tobacco Use    Smoking status: Never     Passive exposure: Never    Smokeless tobacco: Never   Substance Use Topics    Alcohol use: Not Currently     Comment: 2 per year    Drug use: Never

## 2025-07-01 NOTE — PROGRESS NOTES
CC: Headache     40 y.o. Female presents today for initial evaluation of her migraine headache with visual disturbances.  Symptoms have persisted even with taking the maximum dose of prescribed Triptan medication.  She has not taken anything else but did discuss this case with her neurologist.  Her neurologist prescribed her a Medrol Dosepak but advised she started tomorrow.  She is here to see if there is anything else that can be done in the meantime prior to starting this medication.  She has not taken any anti-inflammatories or Tylenol yet today.  She also has Zofran that she can take but has not taken this yet.  She admits to a stabbing pain in her left eye that has persisted.  Her migraine symptoms feel like her normal migraine and are usually associated with hormonal changes.    Occupation: [employer] -       PAST MEDICAL HISTORY:   Past Medical History:   Diagnosis Date    GERD (gastroesophageal reflux disease)     History of shingles     Migraine     Migraine headache     Ulcer        PAST SURGICAL HISTORY:  Past Surgical History:   Procedure Laterality Date    CHOLECYSTECTOMY      ESOPHAGOGASTRODUODENOSCOPY N/A 2025    Procedure: EGD (ESOPHAGOGASTRODUODENOSCOPY);  Surgeon: Krupa Oliveros MD;  Location: [facility] ENDOSCOPY;  Service: Gastroenterology;  Laterality: N/A;  molina/veena/benedict   Precall complete;patient confirmed;MB    KNEE SURGERY Left     SHOULDER SURGERY Right        FAMILY HISTORY:  Family History   Problem Relation Name Age of Onset    Cancer Mother Yuliet         skin    Migraines Mother Yuliet     Hypertension Father      Kidney cancer Maternal Grandmother Anjana 50    Diabetes Maternal Grandmother Anjana     Myelodysplastic syndrome Paternal Grandmother Tanya 64    Brain cancer Paternal Grandfather  60    Ovarian cancer Neg Hx      Colon cancer Neg Hx      Eclampsia Neg Hx      Miscarriages / Stillbirths Neg Hx       labor Neg Hx      Stroke Neg Hx         SOCIAL  "HISTORY:  Social History[1]    MEDICATIONS:   Current Medications[2]    ALLERGIES:   Review of patient's allergies indicates:   Allergen Reactions    Codeine Anaphylaxis    Demerol [meperidine] Anaphylaxis    Morphine Anaphylaxis        PHYSICAL EXAMINATION:  LMP 06/04/2025 (Approximate)   Vitals signs and nursing note have been reviewed.  General: In no acute distress, well developed, well nourished, no diaphoresis  Eyes: EOM full and smooth, no eye redness or discharge  HENT: normocephalic and atraumatic, neck supple, trachea midline, no nasal discharge  Cardiovascular: no LE edema  Lungs: respirations non-labored, no conversational dyspnea   Neuro: AAOx3, CN2-12 grossly intact  Skin: No rashes, warm and dry  Psychiatric: cooperative, pleasant, mood and affect appropriate for age      PROCEDURE:  Ketorolac Tromethamine Injection, USP  Performed by: JIM CHI  Authorized by: JIM CHI  Consent done?: Yes (verbal)  Indications: Pain  Site marked: The procedure site was marked  Timeout: Prior to procedure the correct patient, procedure, and site was verified    Location: deltoid muscle, lateral shoulder, right  Prep: Patient was prepped with alcohol  Procedure: The right lateral shoulder was cleaned with alcohol and 2 cc ketorolac was injected using a 25 G 1.5" needle IM. The plunger was retracted to ensure no infiltration into a vessel. After this was confirmed, the 2 cc was injected IM. The needle was removed and bandage was placed.     NDC: 46600-349-90  LOT: [id number]  EXP: 2026-04       ASSESSMENT:      ICD-10-CM ICD-9-CM   1. Migraine with aura and with status migrainosus, not intractable  G43.101 346.03         PLAN:  Migraine -     - Christie presents to clinic today with complaints of migraine headache with visual disturbances. She has tried her triptan medication but it did not eradicate symptoms.    - IM Toradol injection administered today. See above for procedure detail.     - Medication " counseling done today. OK to take 1000mg Tylenol tonight if symptoms persist. If needed, OK to start medrol dosepack tomorrow AM if symptoms persist through the night.      Future planning includes - pending response to medications    All questions were answered to the best of my ability and all concerns were addressed at this time.    Follow up as needed.       This note is dictated using the M*Modal Fluency Direct word recognition program. There are word recognition mistakes that are occasionally missed on review.               [1]   Social History  Socioeconomic History    Marital status:     Number of children: 0   Occupational History    Occupation: Sports Medicine;      Employer: [employer]   Tobacco Use    Smoking status: Never     Passive exposure: Never    Smokeless tobacco: Never   Substance and Sexual Activity    Alcohol use: Not Currently     Comment: 2 per year    Drug use: Never    Sexual activity: Yes     Partners: Female     Birth control/protection: None     Social Drivers of Health     Financial Resource Strain: Low Risk  (2/13/2025)    Overall Financial Resource Strain (CARDIA)     Difficulty of Paying Living Expenses: Not hard at all   Food Insecurity: No Food Insecurity (2/13/2025)    Hunger Vital Sign     Worried About Running Out of Food in the Last Year: Never true     Ran Out of Food in the Last Year: Never true   Transportation Needs: No Transportation Needs (2/13/2025)    PRAPARE - Transportation     Lack of Transportation (Medical): No     Lack of Transportation (Non-Medical): No   Physical Activity: Sufficiently Active (2/13/2025)    Exercise Vital Sign     Days of Exercise per Week: 4 days     Minutes of Exercise per Session: 40 min   Stress: No Stress Concern Present (2/13/2025)    [place] citizen [place] [employer] - Occupational Stress Questionnaire     Feeling of Stress : Not at all   Housing Stability: Low Risk  (2/13/2025)     Housing Stability Vital Sign     Unable to Pay for Housing in the Last Year: No     Number of Times Moved in the Last Year: 0     Homeless in the Last Year: No   [2]   Current Outpatient Medications:     albuterol (PROVENTIL/VENTOLIN HFA) 90 mcg/actuation inhaler, Inhale 1-2 puffs into the lungs every 6 (six) hours as needed for Wheezing. Rescue, Disp: 18 g, Rfl: 0    azelastine (ASTELIN) 137 mcg (0.1 %) nasal spray, 1 spray (137 mcg total) by Nasal route 2 (two) times daily., Disp: 30 mL, Rfl: 5    methylPREDNISolone (MEDROL DOSEPACK) 4 mg tablet, use as directed, Disp: 1 each, Rfl: 0    ondansetron (ZOFRAN-ODT) 4 MG TbDL, Dissolve 1 tablet (4 mg total) by mouth 2 (two) times daily., Disp: 60 tablet, Rfl: 3    rimegepant (NURTEC) 75 mg odt, Take 1 tablet (75 mg total) by mouth daily as needed for Migraine. Place ODT tablet on the tongue; alternatively the ODT tablet may be placed under the tongue, Disp: 8 tablet, Rfl: 2    sodium chloride (SALINE NASAL) 0.65 % nasal spray, 1 spray by Nasal route as needed for Congestion., Disp: 50 mL, Rfl: 0    triamcinolone (NASACORT) 55 mcg nasal inhaler, 2 sprays by Nasal route once daily., Disp: 16.9 mL, Rfl: 5  No current facility-administered medications for this visit.

## 2025-07-01 NOTE — PATIENT INSTRUCTIONS
Start Nurtec 75 mg at headache onset. Do not take more than 1 pill in 24 hours  Counseled on not to use estrogen based medications due to having migraines with aura and as a result are at increased risk for blood clots.  Discontinue rizatriptan  Medrol dose pack prescribed. Take as instructed on package insert.

## 2025-07-07 ENCOUNTER — TELEPHONE (OUTPATIENT)
Dept: PRIMARY CARE CLINIC | Facility: CLINIC | Age: 40
End: 2025-07-07

## 2025-07-07 ENCOUNTER — RESULTS FOLLOW-UP (OUTPATIENT)
Dept: PRIMARY CARE CLINIC | Facility: CLINIC | Age: 40
End: 2025-07-07

## 2025-07-07 ENCOUNTER — LAB VISIT (OUTPATIENT)
Dept: LAB | Facility: HOSPITAL | Age: 40
End: 2025-07-07
Payer: COMMERCIAL

## 2025-07-07 ENCOUNTER — OFFICE VISIT (OUTPATIENT)
Dept: PRIMARY CARE CLINIC | Facility: CLINIC | Age: 40
End: 2025-07-07
Payer: COMMERCIAL

## 2025-07-07 VITALS
OXYGEN SATURATION: 99 % | WEIGHT: 169 LBS | SYSTOLIC BLOOD PRESSURE: 114 MMHG | DIASTOLIC BLOOD PRESSURE: 80 MMHG | HEART RATE: 73 BPM | HEIGHT: 66 IN | RESPIRATION RATE: 18 BRPM | BODY MASS INDEX: 27.16 KG/M2

## 2025-07-07 DIAGNOSIS — R07.9 CHEST PAIN, UNSPECIFIED TYPE: Primary | ICD-10-CM

## 2025-07-07 DIAGNOSIS — R07.9 CHEST PAIN, UNSPECIFIED TYPE: ICD-10-CM

## 2025-07-07 LAB
ABSOLUTE EOSINOPHIL (OHS): 0.05 K/UL
ABSOLUTE MONOCYTE (OHS): 0.49 K/UL (ref 0.3–1)
ABSOLUTE NEUTROPHIL COUNT (OHS): 4.27 K/UL (ref 1.8–7.7)
ALBUMIN SERPL BCP-MCNC: 4.6 G/DL (ref 3.5–5.2)
ALP SERPL-CCNC: 72 UNIT/L (ref 40–150)
ALT SERPL W/O P-5'-P-CCNC: 18 UNIT/L (ref 10–44)
AMYLASE SERPL-CCNC: 42 UNIT/L (ref 20–110)
ANION GAP (OHS): 8 MMOL/L (ref 8–16)
AST SERPL-CCNC: 19 UNIT/L (ref 11–45)
BASOPHILS # BLD AUTO: 0.02 K/UL
BASOPHILS NFR BLD AUTO: 0.3 %
BILIRUB SERPL-MCNC: 0.9 MG/DL (ref 0.1–1)
BUN SERPL-MCNC: 10 MG/DL (ref 6–20)
CALCIUM SERPL-MCNC: 9.3 MG/DL (ref 8.7–10.5)
CHLORIDE SERPL-SCNC: 106 MMOL/L (ref 95–110)
CO2 SERPL-SCNC: 26 MMOL/L (ref 23–29)
CREAT SERPL-MCNC: 0.7 MG/DL (ref 0.5–1.4)
ERYTHROCYTE [DISTWIDTH] IN BLOOD BY AUTOMATED COUNT: 12.8 % (ref 11.5–14.5)
GFR SERPLBLD CREATININE-BSD FMLA CKD-EPI: >60 ML/MIN/1.73/M2
GLUCOSE SERPL-MCNC: 81 MG/DL (ref 70–110)
HCT VFR BLD AUTO: 38.7 % (ref 37–48.5)
HGB BLD-MCNC: 12.8 GM/DL (ref 12–16)
IMM GRANULOCYTES # BLD AUTO: 0.02 K/UL (ref 0–0.04)
IMM GRANULOCYTES NFR BLD AUTO: 0.3 % (ref 0–0.5)
LIPASE SERPL-CCNC: 15 U/L (ref 4–60)
LYMPHOCYTES # BLD AUTO: 1.73 K/UL (ref 1–4.8)
MCH RBC QN AUTO: 30.3 PG (ref 27–31)
MCHC RBC AUTO-ENTMCNC: 33.1 G/DL (ref 32–36)
MCV RBC AUTO: 92 FL (ref 82–98)
NUCLEATED RBC (/100WBC) (OHS): 0 /100 WBC
OHS QRS DURATION: 78 MS
OHS QTC CALCULATION: 439 MS
PLATELET # BLD AUTO: 255 K/UL (ref 150–450)
PMV BLD AUTO: 10.7 FL (ref 9.2–12.9)
POTASSIUM SERPL-SCNC: 4.8 MMOL/L (ref 3.5–5.1)
PROT SERPL-MCNC: 7.4 GM/DL (ref 6–8.4)
RBC # BLD AUTO: 4.23 M/UL (ref 4–5.4)
RELATIVE EOSINOPHIL (OHS): 0.8 %
RELATIVE LYMPHOCYTE (OHS): 26.3 % (ref 18–48)
RELATIVE MONOCYTE (OHS): 7.4 % (ref 4–15)
RELATIVE NEUTROPHIL (OHS): 64.9 % (ref 38–73)
SODIUM SERPL-SCNC: 140 MMOL/L (ref 136–145)
WBC # BLD AUTO: 6.58 K/UL (ref 3.9–12.7)

## 2025-07-07 PROCEDURE — 83690 ASSAY OF LIPASE: CPT

## 2025-07-07 PROCEDURE — 99499 UNLISTED E&M SERVICE: CPT | Mod: S$GLB,,, | Performed by: INTERNAL MEDICINE

## 2025-07-07 PROCEDURE — 93010 ELECTROCARDIOGRAM REPORT: CPT | Mod: S$GLB,,, | Performed by: INTERNAL MEDICINE

## 2025-07-07 PROCEDURE — 85025 COMPLETE CBC W/AUTO DIFF WBC: CPT

## 2025-07-07 PROCEDURE — 82150 ASSAY OF AMYLASE: CPT

## 2025-07-07 PROCEDURE — 36415 COLL VENOUS BLD VENIPUNCTURE: CPT

## 2025-07-07 PROCEDURE — 93005 ELECTROCARDIOGRAM TRACING: CPT | Mod: S$GLB,,, | Performed by: INTERNAL MEDICINE

## 2025-07-07 PROCEDURE — 80053 COMPREHEN METABOLIC PANEL: CPT

## 2025-07-07 RX ORDER — PANTOPRAZOLE SODIUM 40 MG/1
40 TABLET, DELAYED RELEASE ORAL 2 TIMES DAILY
Qty: 180 TABLET | Refills: 0 | Status: SHIPPED | OUTPATIENT
Start: 2025-07-07 | End: 2025-10-05

## 2025-07-07 NOTE — PROGRESS NOTES
40-year-old woman with a history of migraine, essential tremor, here with a complaint of epigastric pain radiating to her chest.  She is status post gallbladder surgery.  She has a history of laryngeal reflux and had been on Pepcid prior to her EGD in April.      History of present illness and pertinent review of systems:  The patient has a 4 day history.  The pain is epigastrium and spreads to the chest.  The symptoms are pressure-like in hurts and radiates into the back.  There is nausea but no vomiting.  There was a single loose stool yesterday.  The patient had her gallbladder removed 15 or 16 years ago.  She currently notes no jaundice, right upper quadrant pain, increased bruising or bleeding.  The patient has had issues of epigastric pain and discomfort.  She had an upper endoscopy in April of this year.  Esophagus was normal however the gastric body demonstrated erythema consistent with gastritis.  H pylori was negative.  Prior to the upper endoscopy the patient has been on Pepcid for laryngeal reflux.  After the endoscopy she was on omeprazole for 8 weeks.  The patient does not drink alcohol.  With the epigastrium and chest pain the patient has no palpitations, unexplained diaphoresis, shortness a breath or dyspnea on exertion.  The discomfort does take her breath away however.  She notes all food causes discomfort.  She notes her mother had a history of hiatal hernia.  There is a maternal history of coronary artery disease in grandmother and uncles.  The patient also is not a smoker.  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    Problem list, medication list, and allergies have been reviewed.  The patient is allergic to narcotics which includes codeine, Demerol, and morphine which cause anaphylaxis.      Review of systems:    Physical Exam  Vitals and nursing note reviewed.   Constitutional:       General: She is not in acute distress.     Appearance: Normal appearance. She  is normal weight. She is ill-appearing. She is not toxic-appearing or diaphoretic.   HENT:      Head: Atraumatic.      Nose: Nose normal. No congestion.      Mouth/Throat:      Mouth: Mucous membranes are moist.      Pharynx: Oropharynx is clear. No posterior oropharyngeal erythema.   Eyes:      General: No scleral icterus.     Conjunctiva/sclera: Conjunctivae normal.   Cardiovascular:      Rate and Rhythm: Normal rate and regular rhythm.      Pulses: Normal pulses.      Heart sounds: Normal heart sounds. No murmur heard.     No gallop.   Pulmonary:      Effort: Pulmonary effort is normal. No respiratory distress.      Breath sounds: No wheezing, rhonchi or rales.   Abdominal:      General: Bowel sounds are normal. There is no distension.      Palpations: Abdomen is soft.      Tenderness: There is abdominal tenderness. There is no guarding or rebound.      Comments: Hepatosplenomegaly.  Tenderness is diffuse in the epigastrium slightly more on the left side   Musculoskeletal:         General: No tenderness.      Cervical back: Neck supple. No tenderness.      Right lower leg: No edema.      Left lower leg: No edema.   Lymphadenopathy:      Cervical: No cervical adenopathy.   Skin:     Coloration: Skin is not jaundiced or pale.   Neurological:      General: No focal deficit present.      Mental Status: She is alert and oriented to person, place, and time.   Psychiatric:         Mood and Affect: Mood normal.         Behavior: Behavior normal.     EKGs:  Normal sinus rhythm at a rate of 70.  Intervals are normal.  Axis is normal.  STT wave segment is normal [place] interval is normal.  EKG is normal    Assessment/plan:    Chest pain/epigastric pain:  The 1 concern by history is that the pain radiates into the back.  The patient did have her gallbladder removed but there is on occasion patients will form new stones in the common bile duct in the pain radiating into the back could suggest pancreatitis.  The patient has had  ongoing issues at least since April.  The patient also may have a posterior ulcer which may radiate into the back.  Plan:  Reviewed above with patient.    Simple diet is explained in AVS.    Protonix twice daily   Antacid such as Mylanta 1 tbsp 30 minutes prior to meals and possibly 30 minutes after meals if the patient has significant discomfort  Lab to exclude pancreatitis  The patient is aware that I will contact her and my phone number will come up as no caller ID   The patient should reach out to myself or her primary care physician in the next 48-72 hours if she is not better.

## 2025-07-07 NOTE — PATIENT INSTRUCTIONS
Thank you for visiting today.  Your abdominal discomfort could represent pancreatitis because the pain is radiating into the back or could represent a posterior ulcer which may also do this.  There is a history of gastritis and I reviewed with you the results of your upper endoscopy done this past April.  In addition your history was positive for laryngeal reflux which is why you were put on Pepcid.  You have now completed your course on omeprazole and I have not taken these medications for at least a month in his appears that you have symptoms again which may or may not be related.  My recommendations are the following:  I have ordered lab on you to exclude the possibility of pancreatitis or possibly even significant bleeding from an ulcer   I will call you with these labs and please remember my phone number comes up as no caller ID  I have ordered for you Protonix which is a proton pump inhibitor to be taken twice daily.    In addition for the next 3-4 days I would recommend you take an antacid like Mylanta or Maalox 1 tbsp 30 minutes before meals and bedtime.  If you are having symptoms after meals you can take 2nd dose 30 minutes after that meal  In terms of eating I recommend that you eat small frequent meals and try to eat foods that are easily digestible and are not fatty, fried, and I would avoid red sauce and tomatoes.  I would also avoid dairy.  Please contact myself or Dr. Cueto in the next several days and let us know how you are doing

## 2025-07-08 ENCOUNTER — TELEPHONE (OUTPATIENT)
Dept: PRIMARY CARE CLINIC | Facility: CLINIC | Age: 40
End: 2025-07-08
Payer: COMMERCIAL

## 2025-07-08 NOTE — TELEPHONE ENCOUNTER
Called patient regarding her abdominal pain.  She states that her abdominal pain is better.  If the pain continues the addition of Bentyl may be helpful in case the patient has some pyloric spasm.

## 2025-07-08 NOTE — TELEPHONE ENCOUNTER
Reviewed labs with patient.  There are clinically significant abnormalities in her lab to suggest pancreatitis or an ulcer.  Current management will be continued.  The patient has not yet gotten any relief from the Protonix and antacid.  Will check on the patient in the morning.

## 2025-07-13 ENCOUNTER — HOSPITAL ENCOUNTER (EMERGENCY)
Facility: HOSPITAL | Age: 40
Discharge: HOME | End: 2025-07-13
Attending: EMERGENCY MEDICINE
Payer: COMMERCIAL

## 2025-07-13 VITALS
HEART RATE: 74 BPM | BODY MASS INDEX: 27 KG/M2 | RESPIRATION RATE: 16 BRPM | TEMPERATURE: 98 F | WEIGHT: 168 LBS | SYSTOLIC BLOOD PRESSURE: 100 MMHG | HEIGHT: 66 IN | DIASTOLIC BLOOD PRESSURE: 6 MMHG | OXYGEN SATURATION: 100 %

## 2025-07-13 DIAGNOSIS — R07.9 CHEST PAIN, UNSPECIFIED TYPE: Primary | ICD-10-CM

## 2025-07-13 DIAGNOSIS — K21.00 GASTROESOPHAGEAL REFLUX DISEASE WITH ESOPHAGITIS WITHOUT HEMORRHAGE: ICD-10-CM

## 2025-07-13 LAB
ABSOLUTE EOSINOPHIL (OHS): 0.06 K/UL
ABSOLUTE MONOCYTE (OHS): 0.39 K/UL (ref 0.3–1)
ABSOLUTE NEUTROPHIL COUNT (OHS): 4.25 K/UL (ref 1.8–7.7)
ALBUMIN SERPL BCP-MCNC: 4.4 G/DL (ref 3.5–5.2)
ALP SERPL-CCNC: 78 UNIT/L (ref 40–150)
ALT SERPL W/O P-5'-P-CCNC: 15 UNIT/L (ref 10–44)
ANION GAP (OHS): 9 MMOL/L (ref 8–16)
AST SERPL-CCNC: 17 UNIT/L (ref 11–45)
B-HCG UR QL: NEGATIVE
BASOPHILS # BLD AUTO: 0.03 K/UL
BASOPHILS NFR BLD AUTO: 0.5 %
BILIRUB SERPL-MCNC: 1 MG/DL (ref 0.1–1)
BNP SERPL-MCNC: 12 PG/ML (ref 0–99)
BUN SERPL-MCNC: 6 MG/DL (ref 6–20)
CALCIUM SERPL-MCNC: 9.4 MG/DL (ref 8.7–10.5)
CHLORIDE SERPL-SCNC: 106 MMOL/L (ref 95–110)
CO2 SERPL-SCNC: 25 MMOL/L (ref 23–29)
CREAT SERPL-MCNC: 0.7 MG/DL (ref 0.5–1.4)
CTP QC/QA: YES
D DIMER PPP IA.FEU-MCNC: 0.19 MG/L FEU
ERYTHROCYTE [DISTWIDTH] IN BLOOD BY AUTOMATED COUNT: 12.4 % (ref 11.5–14.5)
GFR SERPLBLD CREATININE-BSD FMLA CKD-EPI: >60 ML/MIN/1.73/M2
GLUCOSE SERPL-MCNC: 95 MG/DL (ref 70–110)
HCT VFR BLD AUTO: 40.4 % (ref 37–48.5)
HGB BLD-MCNC: 13.5 GM/DL (ref 12–16)
IMM GRANULOCYTES # BLD AUTO: 0 K/UL (ref 0–0.04)
IMM GRANULOCYTES NFR BLD AUTO: 0 % (ref 0–0.5)
LYMPHOCYTES # BLD AUTO: 1.34 K/UL (ref 1–4.8)
MCH RBC QN AUTO: 30.1 PG (ref 27–31)
MCHC RBC AUTO-ENTMCNC: 33.4 G/DL (ref 32–36)
MCV RBC AUTO: 90 FL (ref 82–98)
NUCLEATED RBC (/100WBC) (OHS): 0 /100 WBC
PLATELET # BLD AUTO: 232 K/UL (ref 150–450)
PMV BLD AUTO: 10 FL (ref 9.2–12.9)
POTASSIUM SERPL-SCNC: 4.5 MMOL/L (ref 3.5–5.1)
PROT SERPL-MCNC: 7.4 GM/DL (ref 6–8.4)
RBC # BLD AUTO: 4.48 M/UL (ref 4–5.4)
RELATIVE EOSINOPHIL (OHS): 1 %
RELATIVE LYMPHOCYTE (OHS): 22.1 % (ref 18–48)
RELATIVE MONOCYTE (OHS): 6.4 % (ref 4–15)
RELATIVE NEUTROPHIL (OHS): 70 % (ref 38–73)
SODIUM SERPL-SCNC: 140 MMOL/L (ref 136–145)
TROPONIN I SERPL HS-MCNC: <3 NG/L
TROPONIN I SERPL HS-MCNC: <3 NG/L
WBC # BLD AUTO: 6.07 K/UL (ref 3.9–12.7)

## 2025-07-13 PROCEDURE — 93005 ELECTROCARDIOGRAM TRACING: CPT

## 2025-07-13 PROCEDURE — 25000003 PHARM REV CODE 250: Performed by: EMERGENCY MEDICINE

## 2025-07-13 PROCEDURE — 85025 COMPLETE CBC W/AUTO DIFF WBC: CPT | Performed by: EMERGENCY MEDICINE

## 2025-07-13 PROCEDURE — 63600175 PHARM REV CODE 636 W HCPCS: Mod: JZ,TB | Performed by: EMERGENCY MEDICINE

## 2025-07-13 PROCEDURE — 81025 URINE PREGNANCY TEST: CPT | Performed by: EMERGENCY MEDICINE

## 2025-07-13 PROCEDURE — 83880 ASSAY OF NATRIURETIC PEPTIDE: CPT | Performed by: EMERGENCY MEDICINE

## 2025-07-13 PROCEDURE — 84484 ASSAY OF TROPONIN QUANT: CPT | Performed by: EMERGENCY MEDICINE

## 2025-07-13 PROCEDURE — 85379 FIBRIN DEGRADATION QUANT: CPT | Performed by: EMERGENCY MEDICINE

## 2025-07-13 PROCEDURE — 99285 EMERGENCY DEPT VISIT HI MDM: CPT | Mod: 25

## 2025-07-13 PROCEDURE — 93010 ELECTROCARDIOGRAM REPORT: CPT | Mod: ,,, | Performed by: INTERNAL MEDICINE

## 2025-07-13 PROCEDURE — 80053 COMPREHEN METABOLIC PANEL: CPT | Performed by: EMERGENCY MEDICINE

## 2025-07-13 RX ORDER — SUCRALFATE 1 G/10ML
1 SUSPENSION ORAL
Status: COMPLETED | OUTPATIENT
Start: 2025-07-13 | End: 2025-07-13

## 2025-07-13 RX ORDER — KETOROLAC TROMETHAMINE 30 MG/ML
10 INJECTION, SOLUTION INTRAMUSCULAR; INTRAVENOUS
Status: DISCONTINUED | OUTPATIENT
Start: 2025-07-13 | End: 2025-07-13 | Stop reason: HOSPADM

## 2025-07-13 RX ORDER — SUCRALFATE 1 G/10ML
1 SUSPENSION ORAL
Qty: 400 ML | Refills: 0 | Status: SHIPPED | OUTPATIENT
Start: 2025-07-13 | End: 2025-07-15 | Stop reason: SDUPTHER

## 2025-07-13 RX ADMIN — SUCRALFATE 1 G: 1 SUSPENSION ORAL at 12:07

## 2025-07-13 NOTE — ED PROVIDER NOTES
"Chief Complaint   Chest Pain (Mid-sternal with radiation to back x1wk. Denies SOB, hx of blood clots, or OCP use. Denies cardiac hx )      History Of Present Illness   Christie Bay is a 40 y.o. female presenting with chest pressure that has been intermittent for 10 days until becoming constant yesterday.  She found it difficult to sleep last night as she could not find a comfortable position.  The pain worsens with inspiration and eating.  She does have a history of acid reflux and is compliant with her Protonix.  She is not on oral contraceptives.  No history of hypertension, diabetes, high cholesterol.  She is not a smoker.  She saw her PCP for this 6 days ago.    Review of patient's allergies indicates:   Allergen Reactions    Codeine Anaphylaxis    Demerol [meperidine] Anaphylaxis    Morphine Anaphylaxis       Medications Ordered Prior to Encounter[1]    Past History   As per HPI and below:  Past Medical History[2]  Past Surgical History[3]    Social History[4]    Physical Exam     Vitals:    07/13/25 1038 07/13/25 1344   BP: 121/78 107/73   BP Location:  Left arm   Patient Position:  Sitting   Pulse: 87 78   Resp: 19 16   Temp: 97.9 °F (36.6 °C) 98.1 °F (36.7 °C)   TempSrc: Oral Oral   SpO2: 98% 100%   Weight: 76.2 kg (168 lb)    Height: 5' 6" (1.676 m)      Appearance: Uncomfortable.  Skin: No rashes seen.  Good turgor.  No abrasions.  No ecchymoses.  Eyes: No conjunctival injection.  ENT: Oropharynx clear.    Chest: Clear to auscultation bilaterally.  Good air movement.  No wheezes.  No rhonchi.  Cardiovascular: Regular rate and rhythm.  No murmurs. No gallops. No rubs.  Abdomen: Soft.  Not distended.  Nontender.  No guarding.  No rebound.  Musculoskeletal: Good range of motion all joints.  No deformities.  Neck supple.  No meningismus.  Neurologic: Motor intact.  Sensation intact.  Cerebellar intact.  Cranial nerves intact.  Mental Status:  Alert and oriented x 3.  Appropriate, " conversant.      Initial MDM   Intermittent chest pressure for over a week, now constant for a day.  Worsened by eating in somewhat by inspiration.  Not particularly tender.  No PE risk factors, PERC negative, but given duration will screen with D-dimer.  Will do cardiac workup including CXR.    External Records Reviewed:  Saw her PCP several days ago, labs were pancreatitis at that time were negative.    Medications Given     Medications   ketorolac injection 9.999 mg (9.999 mg Intravenous Not Given 7/13/25 1211)   sucralfate 100 mg/mL suspension 1 g (1 g Oral Given 7/13/25 1212)       Results and Course   Abnormal Labs Reviewed - No abnormal labs to display    Imaging Results              X-Ray Chest AP Portable (Final result)  Result time 07/13/25 12:09:54      Final result by Sarbjit Vazquez MD (07/13/25 12:09:54)                   Impression:      No convincing evidence of acute cardiopulmonary disease.      Electronically signed by: Sarbjit Vazquez  Date:    07/13/2025  Time:    12:09               Narrative:    EXAMINATION:  XR CHEST AP PORTABLE    CLINICAL HISTORY:  Chest Pain;    TECHNIQUE:  Single frontal view of the chest was performed.    COMPARISON:  Chest radiograph 04/22/2025    FINDINGS:  Cardiomediastinal contours grossly within normal limits    Lungs essentially clear.    No definite pneumothorax or large volume pleural effusion    No acute findings in the visualized abdomen.  Osseous and soft tissue structures appear without definite acute change.  Operative sequela right upper quadrant the abdomen may reflect prior cholecystectomy.                                      ED Course as of 07/13/25 1431   Sun Jul 13, 2025   1046 Triage EKG reviewed: No STEMI [LP]   1105 EKG 12-lead  EKG shows normal sinus rhythm and no acute ischemia per my independent interpretation.   [DC]   1205 Troponin I High Sensitivity: <3 [DC]   1205 BNP: 12 [DC]   1205 D-Dimer: 0.19 [DC]   1205 Creatinine: 0.7 [DC]   1205  WBC: 6.07 [DC]   1205 Hemoglobin: 13.5 [DC]   1205 Platelet Count: 232 [DC]   1205 X-Ray Chest AP Portable  CXR shows no acute disease per my independent interpretation.   [DC]   1248 hCG Qualitative, Urine: Negative [DC]   1410 Troponin I High Sensitivity: <3 [DC]   1429 Patient reports some improvement in the chest heaviness.  She still has occasional fleeting sharp pain with cough or deep inspiration. [DC]      ED Course User Index  [DC] Jac Richmond MD  [LP] Frederick Kidd III, MD               MDM, Impression and Plan   40 y.o. female with chest heaviness that has been intermittent since April, more intermittent over the last 10 days and fairly constant since yesterday.  Cardiac workup is entirely benign.  PE workup is entirely benign.  X-ray shows no evidence of perforation.  Pain is somewhat improved with Carafate.  She also has a musculoskeletal component that is tender at the lower left sternal border.  Will discharge with Carafate, recommend Tylenol for the musculoskeletal portion.  She will be seeing GI in 2 days.         Final diagnoses:  [R07.9] Chest pain, unspecified type (Primary)  [K21.00] Gastroesophageal reflux disease with esophagitis without hemorrhage          ED Disposition Condition    Discharge Stable          ED Prescriptions       Medication Sig Dispense Start Date End Date Auth. Provider    sucralfate (CARAFATE) 100 mg/mL suspension Take 10 mLs (1 g total) by mouth 4 (four) times daily before meals and nightly. for 10 days 400 mL 7/13/2025 7/23/2025 Jac Richmond MD          Follow-up Information       Follow up With Specialties Details Why Contact Info    Keep your GI appointment as scheduled        Your primary care doctor  In 1 week                   [1]   No current facility-administered medications on file prior to encounter.     Current Outpatient Medications on File Prior to Encounter   Medication Sig Dispense Refill    pantoprazole (PROTONIX) 40 MG tablet Take 1 tablet  (40 mg total) by mouth 2 (two) times daily. 180 tablet 0    sodium chloride (SALINE NASAL) 0.65 % nasal spray 1 spray by Nasal route as needed for Congestion. 50 mL 0    triamcinolone (NASACORT) 55 mcg nasal inhaler 2 sprays by Nasal route once daily. 16.9 mL 5    albuterol (PROVENTIL/VENTOLIN HFA) 90 mcg/actuation inhaler Inhale 1-2 puffs into the lungs every 6 (six) hours as needed for Wheezing. Rescue 18 g 0    azelastine (ASTELIN) 137 mcg (0.1 %) nasal spray 1 spray (137 mcg total) by Nasal route 2 (two) times daily. 30 mL 5    methylPREDNISolone (MEDROL DOSEPACK) 4 mg tablet use as directed 21 each 0    ondansetron (ZOFRAN-ODT) 4 MG TbDL Dissolve 1 tablet (4 mg total) by mouth 2 (two) times daily. 60 tablet 3    rimegepant (NURTEC) 75 mg odt Take 1 tablet (75 mg total) by mouth daily as needed for Migraine. Place ODT tablet on the tongue; alternatively the ODT tablet may be placed under the tongue 8 tablet 2   [2]   Past Medical History:  Diagnosis Date    GERD (gastroesophageal reflux disease)     History of shingles     Migraine     Migraine headache     Ulcer    [3]   Past Surgical History:  Procedure Laterality Date    CHOLECYSTECTOMY      ESOPHAGOGASTRODUODENOSCOPY N/A 04/14/2025    Procedure: EGD (ESOPHAGOGASTRODUODENOSCOPY);  Surgeon: Krupa Oliveros MD;  Location: [facility] ENDOSCOPY;  Service: Gastroenterology;  Laterality: N/A;  molina/veena/benedict  4/11 Precall complete;patient confirmed;MB    KNEE SURGERY Left     SHOULDER SURGERY Right    [4]   Social History  Tobacco Use    Smoking status: Never     Passive exposure: Never    Smokeless tobacco: Never   Substance Use Topics    Alcohol use: Not Currently     Comment: 2 per year    Drug use: Never        Jac Richmond MD  07/13/25 [id number]

## 2025-07-13 NOTE — ED TRIAGE NOTES
Christie Annie Bay, a 40 y.o. female presents to the ED w/ complaint of midsternal chest pain for 1 week off and on with radiating to back    Triage note:  Chief Complaint   Patient presents with    Chest Pain     Mid-sternal with radiation to back x1wk. Denies SOB, hx of blood clots, or OCP use. Denies cardiac hx      Review of patient's allergies indicates:   Allergen Reactions    Codeine Anaphylaxis    Demerol [meperidine] Anaphylaxis    Morphine Anaphylaxis           APPEARANCE: awake and alert in NAD. PAIN  10/10  SKIN: warm, dry and intact. No breakdown or bruising.  MUSCULOSKELETAL: Patient moving all extremities spontaneously, no obvious swelling or deformities noted. Ambulates independently.  RESPIRATORY: Denies shortness of breath.Respirations unlabored.   CARDIAC: endorse CP, 2+ distal pulses; no peripheral edema  ABDOMEN: S/ND/NT, endorses nausea  : voids spontaneously, denies difficulty  Neurologic: AAO x 4; follows commands equal strength in all extremities; denies numbness/tingling. Denies dizziness

## 2025-07-14 LAB
OHS QRS DURATION: 72 MS
OHS QTC CALCULATION: 424 MS

## 2025-07-14 NOTE — PROGRESS NOTES
Gastroenterology Clinic Consultation Note    Patient ID: Christie Bay is a 40 y.o. female.    Chief Complaint: Gastroesophageal Reflux (ED follow up)    History of Present Illness    CHIEF COMPLAINT:  Patient presents today for worsening epigastric pain and reflux.    HISTORY OF PRESENT ILLNESS:  She recently visited the emergency department on Sunday for chest and back pain, where cardiac and pulmonary embolism workups were benign. She reports pain radiating from front to back with sharp, traversing characteristics. During pain episodes, she experiences tachycardia.  Current treatment provides partial symptom relief. She was previously seen in the ER on July 13th with epigastric pain and guarding. She denies ongoing chest pain at current visit.     GASTROINTESTINAL HISTORY:  EGD in April 2025 revealed gastritis with negative H. pylori. She has a history of ulcer and underwent cholecystectomy 15-16 years ago. She has a history of hemorrhoids with constipation, previously treated with hemorrhoid banding.    CURRENT MEDICATIONS:  She takes Protonix twice daily (started last Monday), Carafate (Sucralfate) up to 4 times daily 30-45 minutes before meals, Miralax as needed for constipation, and Pepcid daily. She reports medication compliance without adverse effects.    LIFESTYLE MODIFICATIONS:  She has eliminated alcohol, BC powder, and caffeine. She has been avoiding red sauce since last week and reports no specific food triggers identified.      ROS:  General: -fever, -chills, -fatigue, -weight gain, -weight loss  Eyes: -vision changes, -redness, -discharge  ENT: -ear pain, -nasal congestion, -sore throat  Cardiovascular: +chest pain, +palpitations, -lower extremity edema, +feelings of fast heart rate  Respiratory: -cough, -shortness of breath  Gastrointestinal: -abdominal pain, -nausea, -vomiting, -diarrhea, -constipation, -blood in stool, +indigestion, +difficulty swallowing, +sense of lump/mass in  throat when swallowing  Genitourinary: -dysuria, -hematuria, -frequency  Musculoskeletal: -joint pain, -muscle pain, +back pain  Skin: -rash, -lesion  Neurological: -headache, -dizziness, -numbness, -tingling  Psychiatric: -anxiety, -depression, -sleep difficulty         Physical Exam  Vitals and nursing note reviewed.   Constitutional:       General: She is not in acute distress.     Appearance: Normal appearance. She is not ill-appearing.   HENT:      Head: Normocephalic and atraumatic.      Right Ear: External ear normal.      Left Ear: External ear normal.      Nose: Nose normal.   Eyes:      General: No scleral icterus.     Extraocular Movements: Extraocular movements intact.   Cardiovascular:      Rate and Rhythm: Normal rate.   Pulmonary:      Effort: Pulmonary effort is normal. No respiratory distress.   Abdominal:      General: There is no distension.      Palpations: Abdomen is soft.      Tenderness: There is no guarding.   Musculoskeletal:         General: Normal range of motion.      Cervical back: Normal range of motion.   Skin:     General: Skin is warm.   Neurological:      Mental Status: She is alert and oriented to person, place, and time.   Psychiatric:         Mood and Affect: Mood normal.         Behavior: Behavior is cooperative.         Thought Content: Thought content normal.         Medical History:  has a past medical history of GERD (gastroesophageal reflux disease), History of shingles, Migraine, Migraine headache, and Ulcer.    Surgical History:  has a past surgical history that includes Knee surgery (Left); Cholecystectomy; Shoulder surgery (Right); and Esophagogastroduodenoscopy (N/A, 04/14/2025).    Family History: family history includes Brain cancer (age of onset: 60) in her paternal grandfather; Cancer in her mother; Diabetes in her maternal grandmother; Hypertension in her father; Kidney cancer (age of onset: 50) in her maternal grandmother; Migraines in her mother; Myelodysplastic  "syndrome (age of onset: 64) in her paternal grandmother..       Review of patient's allergies indicates:   Allergen Reactions    Codeine Anaphylaxis    Demerol [meperidine] Anaphylaxis    Morphine Anaphylaxis       Medications Ordered Prior to Encounter[1]    Labs:  Lab Results   Component Value Date    WBC 6.07 07/13/2025    HGB 13.5 07/13/2025    HCT 40.4 07/13/2025     07/13/2025    CHOL 200 (H) 12/27/2024    TRIG 69 12/27/2024    HDL 63 12/27/2024    ALKPHOS 78 07/13/2025    LIPASE 15 07/07/2025    ALT 15 07/13/2025    AST 17 07/13/2025     07/13/2025    K 4.5 07/13/2025     07/13/2025    CREATININE 0.7 07/13/2025    BUN 6 07/13/2025    CO2 25 07/13/2025    TSH 0.681 12/27/2024    HGBA1C 5.2 12/27/2024       Vital Signs:  /74   Pulse 79   Ht 5' 6" (1.676 m)   Wt 75.7 kg (166 lb 14.2 oz)   LMP 07/01/2025   BMI 26.94 kg/m²   Body mass index is 26.94 kg/m².    Imaging reviewed:   XR CHEST AP PORTABLE     CLINICAL HISTORY:  Chest Pain;     TECHNIQUE:  Single frontal view of the chest was performed.     COMPARISON:  Chest radiograph 04/22/2025     FINDINGS:  Cardiomediastinal contours grossly within normal limits     Lungs essentially clear.     No definite pneumothorax or large volume pleural effusion     No acute findings in the visualized abdomen.  Osseous and soft tissue structures appear without definite acute change.  Operative sequela right upper quadrant the abdomen may reflect prior cholecystectomy.     Impression:     No convincing evidence of acute cardiopulmonary disease.        Electronically signed by:Sarbjit Vazquez  Date:                                            07/13/2025      Endoscopy reviewed:     Procedure:             Upper GI endoscopy   Indications:           Epigastric abdominal pain   Providers:             Krupa Oliveros MD, Corie Hutton RN,                          Rebecca Ivan CRNA, Julienne Cullen, Technician   Referring MD:          Yumiko BAPTISTE " CARMEN Donald   Complications:         No immediate complications.   Medicines:             Monitored Anesthesia Care   Procedure:             Pre-Anesthesia Assessment:                          - ASA Grade Assessment: per anesthesia.                          After obtaining informed consent, the endoscope                          was passed under direct vision. Throughout the                          procedure, the patient's blood pressure, pulse,                          and oxygen saturations were monitored                          continuously. The Olympus scope GIF-                          ([id number]) was introduced through the mouth, and                          advanced to the second part of duodenum. The upper                          GI endoscopy was accomplished without difficulty.                          The patient tolerated the procedure well.   Findings:       The examined esophagus was normal.        The Z-line was regular and was found 36 cm from the incisors.        Patchy moderate inflammation characterized by congestion (edema) and        erythema was found in the gastric body and in the gastric antrum.        Biopsies were taken with a cold forceps for Helicobacter pylori        testing.        The cardia and gastric fundus were normal on retroflexion.        The duodenal bulb, first portion of the duodenum and second portion        of the duodenum were normal. Biopsies for histology were taken with        a cold forceps for evaluation of celiac disease.   Impression:            - Normal esophagus.                          - Z-line regular, 36 cm from the incisors.                          - Gastritis. Biopsied.                          - Normal duodenal bulb, first portion of the                          duodenum and second portion of the duodenum.                          Biopsied.   Recommendation:        - Discharge patient to home (ambulatory).                          - Resume regular  diet.                          - Continue present medications.                          - Await pathology results.   Krupa Oliveros MD   4/14/2025 12:01:58 PM     Assessment:  1. Epigastric pain    2. Gastroesophageal reflux disease, unspecified whether esophagitis present      Orders Placed This Encounter    esomeprazole (NEXIUM) 40 MG capsule    famotidine (PEPCID) 40 MG tablet    sucralfate (CARAFATE) 100 mg/mL suspension       Assessment & Plan      GASTRO-ESOPHAGEAL REFLUX DISEASE (GERD):  - Ongoing epigastric pain and reflux symptoms, noting recent ER visit for chest and back pain.  - Explained that chronic reflux can cause symptoms mimicking cardiac issues, including back pain and chest discomfort.  - Avoid trigger foods that may exacerbate reflux symptoms: spicy, greasy, fatty, citrus, alcohol, chocolate, carbonated beverages, and caffeine.  - Do not lie down within 3 hours after eating.  - Monitor and report any specific food triggers noticed.  - Started Nexium (esomeprazole) twice daily, switching from Protonix.  - Increased Pepcid (famotidine) to twice daily.  - Continued Carafate (sucralfate) up to 4 times daily, to be taken 30-45 minutes before meals and at bedtime.  - Take Nexium and Pepcid together, but separate from other medications by at least 1 hour.    GASTRITIS:  - Reviewed previous EGD findings of chronic gastritis and reactive gastropathy, likely due to NSAID use.  - Explained the coating action of Carafate (sucralfate) on the stomach lining.    DIAPHRAGMATIC HERNIA:  - Considered possibility of hiatal hernia, though not evident on previous EGD.    ANXIETY DISORDER:  - Recognized potential anxiety component exacerbating symptoms.    MEDICATION SIDE EFFECTS AND MANAGEMENT:  - Discussed potential side effects of PPIs, including constipation and long-term impact on bone health and nutrient absorption.  - Maximized medical management before considering more invasive diagnostic procedures or  surgical interventions.    FOLLOW-UP PLAN:  - Follow up in 8 weeks.  - Contact the office if symptoms persist, may need to schedule impedance study or Bravo pH monitoring.            For GERD/Reflux:  Take your PPI 30-45 minutes before breakfast and dinner. If symptoms improve ok discontinue an use PPI as needed for symptoms.   Take Pepcid 40 mg twice per day   Remain upright for at least 3 hours after eating.   Elevate the head of the bed for nighttime.   Avoid foods that you have noticed make your symptoms worse (possible triggers include: peppermint, alcohol, chocolate, caffeine, spicy foods, greasy/fried foods, acidic foods-citrus).   Lose weight if you are overweight -- of all of the lifestyle changes you can make, this one is the most effective.        LACI DUKES-C  Gastroenterology Department  [facility] [address] Office [phone]     This note was generated with the assistance of ambient listening technology. Verbal consent was obtained by the patient and accompanying visitor(s) for the recording of patient appointment to facilitate this note. I attest to having reviewed and edited the generated note for accuracy, though some syntax or spelling errors may persist. Please contact the author of this note for any clarification.                      [1]   Current Outpatient Medications on File Prior to Visit   Medication Sig Dispense Refill    albuterol (PROVENTIL/VENTOLIN HFA) 90 mcg/actuation inhaler Inhale 1-2 puffs into the lungs every 6 (six) hours as needed for Wheezing. Rescue 18 g 0    azelastine (ASTELIN) 137 mcg (0.1 %) nasal spray 1 spray (137 mcg total) by Nasal route 2 (two) times daily. 30 mL 5    methylPREDNISolone (MEDROL DOSEPACK) 4 mg tablet use as directed 21 each 0    ondansetron (ZOFRAN-ODT) 4 MG TbDL Dissolve 1 tablet (4 mg total) by mouth 2 (two) times daily. 60 tablet 3    rimegepant (NURTEC) 75 mg odt Take 1 tablet (75 mg total) by mouth daily as needed for  Migraine. Place ODT tablet on the tongue; alternatively the ODT tablet may be placed under the tongue 8 tablet 2    sodium chloride (SALINE NASAL) 0.65 % nasal spray 1 spray by Nasal route as needed for Congestion. 50 mL 0    triamcinolone (NASACORT) 55 mcg nasal inhaler 2 sprays by Nasal route once daily. 16.9 mL 5    [DISCONTINUED] pantoprazole (PROTONIX) 40 MG tablet Take 1 tablet (40 mg total) by mouth 2 (two) times daily. 180 tablet 0    [DISCONTINUED] sucralfate (CARAFATE) 100 mg/mL suspension Take 10 mL by mouth 4 (four) times daily before meals and nightly for 10 days 400 mL 0     No current facility-administered medications on file prior to visit.

## 2025-07-15 ENCOUNTER — OFFICE VISIT (OUTPATIENT)
Dept: GASTROENTEROLOGY | Facility: CLINIC | Age: 40
End: 2025-07-15
Payer: COMMERCIAL

## 2025-07-15 VITALS
HEART RATE: 79 BPM | BODY MASS INDEX: 26.82 KG/M2 | HEIGHT: 66 IN | WEIGHT: 166.88 LBS | DIASTOLIC BLOOD PRESSURE: 74 MMHG | SYSTOLIC BLOOD PRESSURE: 103 MMHG

## 2025-07-15 DIAGNOSIS — K21.9 GASTROESOPHAGEAL REFLUX DISEASE, UNSPECIFIED WHETHER ESOPHAGITIS PRESENT: ICD-10-CM

## 2025-07-15 DIAGNOSIS — R10.13 EPIGASTRIC PAIN: Primary | ICD-10-CM

## 2025-07-15 PROCEDURE — 99214 OFFICE O/P EST MOD 30 MIN: CPT | Mod: S$GLB,,,

## 2025-07-15 PROCEDURE — 3078F DIAST BP <80 MM HG: CPT | Mod: CPTII,S$GLB,,

## 2025-07-15 PROCEDURE — 99999 PR PBB SHADOW E&M-EST. PATIENT-LVL IV: CPT | Mod: PBBFAC,,,

## 2025-07-15 PROCEDURE — 3008F BODY MASS INDEX DOCD: CPT | Mod: CPTII,S$GLB,,

## 2025-07-15 PROCEDURE — 1159F MED LIST DOCD IN RCRD: CPT | Mod: CPTII,S$GLB,,

## 2025-07-15 PROCEDURE — 3074F SYST BP LT 130 MM HG: CPT | Mod: CPTII,S$GLB,,

## 2025-07-15 RX ORDER — ESOMEPRAZOLE MAGNESIUM 40 MG/1
40 CAPSULE, DELAYED RELEASE ORAL
Qty: 60 CAPSULE | Refills: 11 | Status: SHIPPED | OUTPATIENT
Start: 2025-07-15 | End: 2026-07-15

## 2025-07-15 RX ORDER — SUCRALFATE 1 G/10ML
1 SUSPENSION ORAL
Qty: 400 ML | Refills: 0 | Status: SHIPPED | OUTPATIENT
Start: 2025-07-15 | End: 2025-07-25

## 2025-07-15 RX ORDER — FAMOTIDINE 40 MG/1
40 TABLET, FILM COATED ORAL 2 TIMES DAILY
Qty: 60 TABLET | Refills: 11 | Status: SHIPPED | OUTPATIENT
Start: 2025-07-15 | End: 2026-07-15

## 2025-07-18 ENCOUNTER — OFFICE VISIT (OUTPATIENT)
Dept: OBSTETRICS AND GYNECOLOGY | Facility: CLINIC | Age: 40
End: 2025-07-18
Payer: COMMERCIAL

## 2025-07-18 VITALS — SYSTOLIC BLOOD PRESSURE: 113 MMHG | WEIGHT: 170 LBS | DIASTOLIC BLOOD PRESSURE: 74 MMHG | BODY MASS INDEX: 27.43 KG/M2

## 2025-07-18 DIAGNOSIS — N89.8 VAGINAL DISCHARGE: Primary | ICD-10-CM

## 2025-07-18 PROCEDURE — 99999 PR PBB SHADOW E&M-EST. PATIENT-LVL III: CPT | Mod: PBBFAC,,, | Performed by: OBSTETRICS & GYNECOLOGY

## 2025-07-18 NOTE — PROGRESS NOTES
CC:No chief complaint on file.      HPI: 39 y/o female   Patient noticed green vaginal discharge that was mucus like in texture without odor. Started 2 days ago. She started Nexium and Pepcid 3 days ago as well. The discharge is lighter today. Denies burning, itching, dysuria, or pelvic pain. Denies birth control use. Reports LMP: 25, which lasted for 5 days.     40 y.o.   OB History          1    Para   1    Term   1       0    AB   0    Living   1         SAB   0    IAB   0    Ectopic   0    Multiple   0    Live Births   1               Complaining of:     (Not in a hospital admission)      Review of patient's allergies indicates:   Allergen Reactions    Codeine Anaphylaxis    Demerol [meperidine] Anaphylaxis    Morphine Anaphylaxis        Past Medical History:   Diagnosis Date    GERD (gastroesophageal reflux disease)     History of shingles     Migraine     Migraine headache     Ulcer      Past Surgical History:   Procedure Laterality Date    CHOLECYSTECTOMY      ESOPHAGOGASTRODUODENOSCOPY N/A 2025    Procedure: EGD (ESOPHAGOGASTRODUODENOSCOPY);  Surgeon: Krupa Oliveros MD;  Location: [facility] ENDOSCOPY;  Service: Gastroenterology;  Laterality: N/A;  molina/veena/bendeict   Precall complete;patient confirmed;MB    KNEE SURGERY Left     SHOULDER SURGERY Right      Family History   Problem Relation Name Age of Onset    Cancer Mother Yuliet         skin    Migraines Mother Yuliet     Hypertension Father      Kidney cancer Maternal Grandmother Anjana 50    Diabetes Maternal Grandmother Anjana     Myelodysplastic syndrome Paternal Grandmother Tanya 64    Brain cancer Paternal Grandfather  60    Ovarian cancer Neg Hx      Colon cancer Neg Hx      Eclampsia Neg Hx      Miscarriages / Stillbirths Neg Hx       labor Neg Hx      Stroke Neg Hx       Social History[1]  ROS:  GENERAL: Feeling well overall. Denies fever or chills.   SKIN: Denies rash or lesions.   HEAD: Denies head injury or  headache.   NODES: Denies enlarged lymph nodes.   CHEST: Denies chest pain or shortness of breath.   CARDIOVASCULAR: Denies palpitations or left sided chest pain.    ABDOMEN: Denies diarrhea, nausea, vomiting or rectal bleeding.   URINARY: No dysuria, hematuria, or burning on urination.  REPRODUCTIVE: See HPI.   BREASTS: Denies pain, lumps, or nipple discharge.   HEMATOLOGIC: No easy bruisability or excessive bleeding.   MUSCULOSKELETAL: Denies joint pain or swelling.   NEUROLOGIC: Denies syncope or weakness.   PSYCHIATRIC: Denies depression, anxiety or mood swings.      PE: /74   Wt 77.1 kg (169 lb 15.6 oz)   LMP 07/02/2025   BMI 27.43 kg/m²      APPEARANCE: Well nourished, well developed, in no acute distress.  SKIN: Normal skin turgor, no lesions.  NECK: Neck symmetric without masses or thyromegaly.  NODES: No inguinal, cervical, axillary or femoral lymph node enlargement.  CARDIOVASCULAR: Normal S1, S2. No rubs, murmurs or gallops.  NEUROLOGIC: Normal mood and affect. No depression or anxiety.   ABDOMEN: Soft. No tenderness or masses. No hepatosplenomegaly. No hernias.  RESPIRATORY: Normal respiratory effort with no retractions or use of accessory muscles.  BREASTS: Symmetrical, no skin changes or visible lesions. No palpable masses, nipple discharge, or adenopathy bilaterally.   BLADDER: Non-tender  GENITALIA: normal external genitalia, no erythema, no discharge  URETHRA: normal urethra, normal urethral meatus  VAGINA: Normal  CERVIX: no lesions or cervical motion tenderness and white, physiological discharge   UTERUS: normal  ADNEXA: normal adnexa    Chaperone present for GYN exam:Yes  Name of Chaperone present: Mahsa Dash MA      ASSESSMENT/ PLAN    Diagnoses and all orders for this visit:    Vaginal discharge  -     Vaginosis Screen by DNA Probe  -     C. trachomatis/N. gonorrhoeae by AMP DNA [facility]; Cervix            Ascencion Early MD         [1]   Social History  Tobacco Use    Smoking  status: Never     Passive exposure: Never    Smokeless tobacco: Never   Substance Use Topics    Alcohol use: Not Currently     Comment: 2 per year    Drug use: Never

## 2025-07-28 ENCOUNTER — OFFICE VISIT (OUTPATIENT)
Dept: PRIMARY CARE CLINIC | Facility: CLINIC | Age: 40
End: 2025-07-28
Payer: COMMERCIAL

## 2025-07-28 ENCOUNTER — LAB VISIT (OUTPATIENT)
Dept: LAB | Facility: HOSPITAL | Age: 40
End: 2025-07-28
Attending: INTERNAL MEDICINE
Payer: COMMERCIAL

## 2025-07-28 VITALS
OXYGEN SATURATION: 98 % | TEMPERATURE: 98 F | HEART RATE: 76 BPM | SYSTOLIC BLOOD PRESSURE: 116 MMHG | HEIGHT: 66 IN | WEIGHT: 171.94 LBS | DIASTOLIC BLOOD PRESSURE: 68 MMHG | BODY MASS INDEX: 27.63 KG/M2

## 2025-07-28 DIAGNOSIS — R82.998 DARK URINE: ICD-10-CM

## 2025-07-28 DIAGNOSIS — J02.9 SORE THROAT: Primary | ICD-10-CM

## 2025-07-28 LAB
BACTERIA #/AREA URNS AUTO: NORMAL /HPF
BILIRUB UR QL STRIP.AUTO: NEGATIVE
CLARITY UR: CLEAR
COLOR UR AUTO: YELLOW
GLUCOSE UR QL STRIP: NEGATIVE
HGB UR QL STRIP: NEGATIVE
KETONES UR QL STRIP: NEGATIVE
LEUKOCYTE ESTERASE UR QL STRIP: ABNORMAL
MICROSCOPIC COMMENT: NORMAL
NITRITE UR QL STRIP: NEGATIVE
PH UR STRIP: 7 [PH]
PROT UR QL STRIP: NEGATIVE
RBC #/AREA URNS AUTO: 1 /HPF (ref 0–4)
SP GR UR STRIP: 1.01
UROBILINOGEN UR STRIP-ACNC: NEGATIVE EU/DL
WBC #/AREA URNS AUTO: 1 /HPF (ref 0–5)
YEAST UR QL AUTO: NORMAL /HPF

## 2025-07-28 PROCEDURE — 81003 URINALYSIS AUTO W/O SCOPE: CPT

## 2025-07-28 PROCEDURE — 1159F MED LIST DOCD IN RCRD: CPT | Mod: CPTII,S$GLB,, | Performed by: INTERNAL MEDICINE

## 2025-07-28 PROCEDURE — 3078F DIAST BP <80 MM HG: CPT | Mod: CPTII,S$GLB,, | Performed by: INTERNAL MEDICINE

## 2025-07-28 PROCEDURE — 3008F BODY MASS INDEX DOCD: CPT | Mod: CPTII,S$GLB,, | Performed by: INTERNAL MEDICINE

## 2025-07-28 PROCEDURE — 3074F SYST BP LT 130 MM HG: CPT | Mod: CPTII,S$GLB,, | Performed by: INTERNAL MEDICINE

## 2025-07-28 PROCEDURE — 99214 OFFICE O/P EST MOD 30 MIN: CPT | Mod: S$GLB,,, | Performed by: INTERNAL MEDICINE

## 2025-07-28 PROCEDURE — 99999 PR PBB SHADOW E&M-EST. PATIENT-LVL III: CPT | Mod: PBBFAC,,, | Performed by: INTERNAL MEDICINE

## 2025-07-28 NOTE — PROGRESS NOTES
[facility] Note    Chief Complaint      Chief Complaint   Patient presents with    Sore Throat     Last few days, hurts swallow, post nasal drip      History of Present Illness      Christie Bay is a 40 y.o. female who presents today for sore throat.  Patient comes to appointment alone.    Had postnasal drip, throat clearing since 7/25.  Started with throat pain (swallowing razor blades).  Took tylenol OTC without relief.  Some ear pain.    2 of her kids are ill. Has had strep throat in past.  No ulcers in throat.  Stools were loose, no BRBPR.    On Flagyl with OBGYN for vaginitis.  Some lower abd pain, urine has been darker.    Problem List Items Addressed This Visit    None  Visit Diagnoses         Sore throat    -  Primary      Dark urine        Relevant Orders    Urinalysis, Reflex to Urine Culture Urine, Clean Catch            Health Maintenance   Topic Date Due    COVID-19 Vaccine (5 - 2024-25 season) 09/01/2024    Influenza Vaccine (1) 09/01/2025    Hemoglobin A1c (Diabetic Prevention Screening)  12/27/2027    Cervical Cancer Screening  02/20/2030    TETANUS VACCINE  05/01/2030    RSV Vaccine (Age 60+ and Pregnant patients) (1 - 1-dose 75+ series) 03/08/2061    Hepatitis C Screening  Completed    HIV Screening  Completed    Lipid Panel  Completed    Pneumococcal Vaccines (Age 0-49)  Aged Out       Past Medical History:   Diagnosis Date    GERD (gastroesophageal reflux disease)     History of shingles     Joint pain 2006    Shoulder sx 2006, Knee sx 2007    Migraine     Migraine headache     Ulcer 2008       Past Surgical History:   Procedure Laterality Date    CHOLECYSTECTOMY  2008    ESOPHAGOGASTRODUODENOSCOPY N/A 04/14/2025    Procedure: EGD (ESOPHAGOGASTRODUODENOSCOPY);  Surgeon: Krupa Oliveros MD;  Location: [facility] ENDOSCOPY;  Service: Gastroenterology;  Laterality: N/A;  molina/veena/benedict  4/11 Precall complete;patient confirmed;MB    KNEE SURGERY Left     SHOULDER  "SURGERY Right        family history includes Acne in her brother and brother; Allergies in her mother; Asthma in her mother; Brain cancer (age of onset: 60) in her paternal grandfather; Cancer in her maternal grandmother, mother, paternal grandfather, and paternal grandmother; Diabetes in her maternal grandmother; Eczema in her mother; Heart disease in her maternal grandmother; Hypertension in her father; Kidney cancer (age of onset: 50) in her maternal grandmother; Melanoma in her mother; Migraines in her mother; Myelodysplastic syndrome (age of onset: 64) in her paternal grandmother; Psoriasis in her mother.    Social History[1]    Review of Systems   Constitutional:  Negative for chills and fever.   Respiratory:  Negative for cough and shortness of breath.    Cardiovascular:  Negative for chest pain and palpitations.   Gastrointestinal:  Negative for constipation, diarrhea, nausea and vomiting.   Genitourinary:  Negative for dysuria and hematuria.   Musculoskeletal:  Negative for falls.   Neurological:  Negative for headaches.        Encounter Medications[2]     Review of patient's allergies indicates:   Allergen Reactions    Codeine Anaphylaxis    Demerol [meperidine] Anaphylaxis    Morphine Anaphylaxis       Physical Exam      Vital Signs  Temp: 98.4 °F (36.9 °C)  Pulse: 76  SpO2: 98 %  BP: 116/68  BP Location: Right arm  Patient Position: Sitting  Height and Weight  Height: 5' 6" (167.6 cm)  Weight: 78 kg (171 lb 15.3 oz)  BSA (Calculated - sq m): 1.91 sq meters  BMI (Calculated): 27.8  Weight in (lb) to have BMI = 25: 154.6]    Physical Exam  Constitutional:       Appearance: She is well-developed.   HENT:      Head: Normocephalic and atraumatic.   Cardiovascular:      Rate and Rhythm: Normal rate and regular rhythm.      Heart sounds: Normal heart sounds. No murmur heard.  Pulmonary:      Effort: Pulmonary effort is normal. No respiratory distress.      Breath sounds: Normal breath sounds.   Abdominal:      " "General: There is no distension.      Palpations: Abdomen is soft.      Tenderness: There is no abdominal tenderness. There is no guarding.   Skin:     General: Skin is warm and dry.   Neurological:      Mental Status: She is alert. Mental status is at baseline.   Psychiatric:         Behavior: Behavior normal.          Laboratory:  CBC:  Recent Labs   Lab Result Units 07/07/25  1449 07/13/25  1120   WBC K/uL 6.58 6.07   RBC M/uL 4.23 4.48   HGB gm/dL 12.8 13.5   HCT % 38.7 40.4   Platelet Count K/uL 255 232   MCV fL 92 90   MCH pg 30.3 30.1   MCHC g/dL 33.1 33.4     CMP:  Recent Labs   Lab Result Units 07/07/25  1449 07/13/25  1120   Glucose mg/dL 81 95   Calcium mg/dL 9.3 9.4   Albumin g/dL 4.6 4.4   Protein Total gm/dL 7.4 7.4   Sodium mmol/L 140 140   Potassium mmol/L 4.8 4.5   CO2 mmol/L 26 25   Chloride mmol/L 106 106   BUN mg/dL 10 6   ALP unit/L 72 78   ALT unit/L 18 15   AST unit/L 19 17   Bilirubin Total mg/dL 0.9 1.0     URINALYSIS:  No results for input(s): "COLORU", "CLARITYU", "SPECGRAV", "PHUR", "PROTEINUA", "GLUCOSEU", "BILIRUBINCON", "BLOODU", "WBCU", "RBCU", "BACTERIA", "MUCUS", "NITRITE", "LEUKOCYTESUR", "UROBILINOGEN", "HYALINECASTS" in the last 2160 hours.   LIPIDS:  No results for input(s): "TSH", "HDL", "CHOL", "TRIG", "LDLCALC", "CHOLHDL", "NONHDLCHOL", "TOTALCHOLEST" in the last 2160 hours.  TSH:  No results for input(s): "TSH" in the last 2160 hours.  A1C:  No results for input(s): "HGBA1C" in the last 2160 hours.    Radiology:  X-Ray Chest AP Portable  Result Date: 7/13/2025  EXAMINATION:  XR CHEST AP PORTABLE    CLINICAL HISTORY:  Chest Pain;    TECHNIQUE:  Single frontal view of the chest was performed.    COMPARISON:  Chest radiograph 04/22/2025    FINDINGS:  Cardiomediastinal contours grossly within normal limits    Lungs essentially clear.    No definite pneumothorax or large volume pleural effusion    No acute findings in the visualized abdomen.  Osseous and soft tissue structures " appear without definite acute change.  Operative sequela right upper quadrant the abdomen may reflect prior cholecystectomy.           Assessment/Plan     Christie Bay is a 40 y.o.female with:    1. Sore throat    2. Dark urine  - Urinalysis, Reflex to Urine Culture Urine, Clean Catch; Future    -start probiotic  -Continue current medications and maintain follow up with specialists.    -Follow up if symptoms worsen or fail to improve.       Beverley Duval MD  [facility]                   [1]   Social History  Tobacco Use    Smoking status: Never     Passive exposure: Never    Smokeless tobacco: Never   Substance Use Topics    Alcohol use: Not Currently     Comment: 2 per year    Drug use: Never   [2]   Outpatient Encounter Medications as of 7/28/2025   Medication Sig Dispense Refill    azelastine (ASTELIN) 137 mcg (0.1 %) nasal spray 1 spray (137 mcg total) by Nasal route 2 (two) times daily. 30 mL 5    esomeprazole (NEXIUM) 40 MG capsule Take 1 capsule (40 mg total) by mouth 2 (two) times daily before meals. 60 capsule 11    famotidine (PEPCID) 40 MG tablet Take 1 tablet (40 mg total) by mouth 2 (two) times daily. 60 tablet 11    metroNIDAZOLE (FLAGYL) 500 MG tablet Take 1 tablet (500 mg total) by mouth every 12 (twelve) hours. 14 tablet 0    ondansetron (ZOFRAN-ODT) 4 MG TbDL Dissolve 1 tablet (4 mg total) by mouth 2 (two) times daily. 60 tablet 3    rimegepant (NURTEC) 75 mg odt Take 1 tablet (75 mg total) by mouth daily as needed for Migraine. Place ODT tablet on the tongue; alternatively the ODT tablet may be placed under the tongue 8 tablet 2    triamcinolone (NASACORT) 55 mcg nasal inhaler 2 sprays by Nasal route once daily. 16.9 mL 5    [DISCONTINUED] albuterol (PROVENTIL/VENTOLIN HFA) 90 mcg/actuation inhaler Inhale 1-2 puffs into the lungs every 6 (six) hours as needed for Wheezing. Rescue 18 g 0    [DISCONTINUED] methylPREDNISolone (MEDROL DOSEPACK) 4 mg tablet use as directed  21 each 0    [DISCONTINUED] sodium chloride (SALINE NASAL) 0.65 % nasal spray 1 spray by Nasal route as needed for Congestion. 50 mL 0     No facility-administered encounter medications on file as of 7/28/2025.

## 2025-07-29 ENCOUNTER — OFFICE VISIT (OUTPATIENT)
Dept: OPHTHALMOLOGY | Facility: CLINIC | Age: 40
End: 2025-07-29
Payer: COMMERCIAL

## 2025-07-29 ENCOUNTER — PATIENT MESSAGE (OUTPATIENT)
Dept: OPTOMETRY | Facility: CLINIC | Age: 40
End: 2025-07-29
Payer: COMMERCIAL

## 2025-07-29 DIAGNOSIS — G43.709 CHRONIC MIGRAINE WITHOUT AURA WITHOUT STATUS MIGRAINOSUS, NOT INTRACTABLE: Primary | ICD-10-CM

## 2025-07-29 DIAGNOSIS — H31.002 CHORIORETINAL SCAR OF LEFT EYE: ICD-10-CM

## 2025-07-29 DIAGNOSIS — H43.393 FLOATERS, BILATERAL: ICD-10-CM

## 2025-07-29 LAB — HOLD SPECIMEN: NORMAL

## 2025-07-29 PROCEDURE — 99999 PR PBB SHADOW E&M-EST. PATIENT-LVL III: CPT | Mod: PBBFAC,,, | Performed by: OPHTHALMOLOGY

## 2025-07-29 NOTE — PROGRESS NOTES
"Subjective     Patient ID: Christie Bay is a 40 y.o. female.    Chief Complaint: Concerns About Ocular Health    HPI    DSL- 11/2024     39 y/o female present to clinic for urgent care. History of Myopia and   migraines. She present to clinic for concerns of increased floaters of   both eye OS>OD x 2 weeks. She does not have flashes of lights associated   with floaters. She denies excessive lifting or straining activities. She   denies  ocular pain. She has concerns of blurry vision with new brand of   contact lens. She does has ocular migraines with auras but has not happen   "in awhile'. She has tinnitus and dry dry eyes when not using at's. No   headaches or diplopia.     Eyemeds  At's OU PRN  Last edited by Irwin Rodriguez on 7/29/2025  8:43 AM.      Review of Systems       Objective     Physical Exam       Assessment and Plan     1. Chronic migraine without aura without status migrainosus, not intractable    2. Floaters, bilateral  -     Color Fundus Photography - OU - Both Eyes; Future    3. Chorioretinal scar of left eye    Other orders  -     Color Fundus Photography - OU - Both Eyes  -     Color Fundus Photography - OU - Both Eyes        Rd precautions given -          Follow up in about 6 months (around 1/29/2026) for regular visit with dr sheridan .  - patient would like go back to original contact lens brand   "

## 2025-07-31 ENCOUNTER — OFFICE VISIT (OUTPATIENT)
Dept: SPORTS MEDICINE | Facility: CLINIC | Age: 40
End: 2025-07-31
Payer: COMMERCIAL

## 2025-07-31 ENCOUNTER — APPOINTMENT (OUTPATIENT)
Dept: RADIOLOGY | Facility: OTHER | Age: 40
End: 2025-07-31
Attending: ORTHOPAEDIC SURGERY
Payer: COMMERCIAL

## 2025-07-31 VITALS — HEIGHT: 66 IN | WEIGHT: 171.94 LBS | BODY MASS INDEX: 27.63 KG/M2

## 2025-07-31 DIAGNOSIS — M99.03 SOMATIC DYSFUNCTION OF LUMBAR REGION: ICD-10-CM

## 2025-07-31 DIAGNOSIS — M99.06 SOMATIC DYSFUNCTION OF LOWER EXTREMITY: ICD-10-CM

## 2025-07-31 DIAGNOSIS — M99.02 SOMATIC DYSFUNCTION OF THORACIC REGION: ICD-10-CM

## 2025-07-31 DIAGNOSIS — M54.59 MECHANICAL LOW BACK PAIN: Primary | ICD-10-CM

## 2025-07-31 DIAGNOSIS — M54.9 BACK PAIN, UNSPECIFIED BACK LOCATION, UNSPECIFIED BACK PAIN LATERALITY, UNSPECIFIED CHRONICITY: ICD-10-CM

## 2025-07-31 DIAGNOSIS — M25.50 POLYARTHRALGIA: ICD-10-CM

## 2025-07-31 DIAGNOSIS — M99.04 SOMATIC DYSFUNCTION OF SACRAL REGION: ICD-10-CM

## 2025-07-31 DIAGNOSIS — M99.05 SOMATIC DYSFUNCTION OF PELVIS REGION: ICD-10-CM

## 2025-07-31 PROCEDURE — 99999 PR PBB SHADOW E&M-EST. PATIENT-LVL III: CPT | Mod: PBBFAC,,, | Performed by: ORTHOPAEDIC SURGERY

## 2025-07-31 PROCEDURE — 72110 X-RAY EXAM L-2 SPINE 4/>VWS: CPT | Mod: 26,,, | Performed by: RADIOLOGY

## 2025-07-31 PROCEDURE — 72110 X-RAY EXAM L-2 SPINE 4/>VWS: CPT | Mod: TC,PN

## 2025-08-01 NOTE — PROGRESS NOTES
CC: bilateral low back pain    40 y.o. Female presents today for evaluation of her bilateral low back pain. She denies any specific injury or trauma. She notes discomfot at her lateral hips and lower back and recently has started to have symptoms radiate/wrap around her legs and travel into her lower legs and ankles.    How long: Several weeks  What makes it better: Rest, activity modifications  What makes it worse: certain exercise activities  Does it radiate: yes, down legs wrapping  Numbness/tingling down legs: denies  Attempted treatments: OTC NSAIDs, stretches, activity modifications  Pain score: 2/10  Any mechanical symptoms: denies  Feelings of instability: denies  Problems with ADLs: admits to difficulties working around the house    Occupation: AT administration at [employer]      PAST MEDICAL HISTORY:   Past Medical History:   Diagnosis Date    GERD (gastroesophageal reflux disease)     History of shingles     Joint pain 2006    Shoulder sx 2006, Knee sx 2007    Migraine     Migraine headache     Ulcer 2008       PAST SURGICAL HISTORY:   Past Surgical History:   Procedure Laterality Date    CHOLECYSTECTOMY  2008    ESOPHAGOGASTRODUODENOSCOPY N/A 04/14/2025    Procedure: EGD (ESOPHAGOGASTRODUODENOSCOPY);  Surgeon: Krupa Oliveros MD;  Location: [facility] ENDOSCOPY;  Service: Gastroenterology;  Laterality: N/A;  molina/veena/benedict  4/11 Precall complete;patient confirmed;MB    KNEE SURGERY Left     SHOULDER SURGERY Right        FAMILY HISTORY:   Family History   Problem Relation Name Age of Onset    Cancer Mother Yuliet         Skin    Migraines Mother Yuliet     Eczema Mother Yuliet     Psoriasis Mother Yuliet     Melanoma Mother Yuliet     Asthma Mother Yuliet     Allergies Mother Yuliet     Hypertension Father Mayo     Kidney cancer Maternal Grandmother Anjana 50    Diabetes Maternal Grandmother Anjana     Cancer Maternal Grandmother Anjana         Breast    Heart disease Maternal Grandmother Anjana     Myelodysplastic syndrome Paternal  "Grandmother Tanya 64    Cancer Paternal Grandmother Tanya         Blood    Brain cancer Paternal Grandfather Mark 60    Cancer Paternal Grandfather Mark         Brain    Acne Brother Bill     Acne Brother Bill     Ovarian cancer Neg Hx      Colon cancer Neg Hx      Eclampsia Neg Hx      Miscarriages / Stillbirths Neg Hx       labor Neg Hx      Stroke Neg Hx         SOCIAL HISTORY:   Social History[1]    MEDICATIONS:   Current Medications[2]    ALLERGIES:   Review of patient's allergies indicates:   Allergen Reactions    Codeine Anaphylaxis    Demerol [meperidine] Anaphylaxis    Morphine Anaphylaxis        PHYSICAL EXAMINATION:  Ht 5' 6" (1.676 m)   Wt 78 kg (171 lb 15.3 oz)   LMP 2025   BMI 27.75 kg/m²   Vitals signs and nursing note have been reviewed.  General: In no acute distress, well developed, well nourished, no diaphoresis  Eyes: EOM full and smooth, no eye redness or discharge  HENT: normocephalic and atraumatic, neck supple, trachea midline, no nasal discharge, no external ear redness or discharge  Cardiovascular: no LE edema  Lungs: respirations non-labored, no conversational dyspnea   Abd: non-distended, no rigidity  MSK: no amputation or deformity, no swelling of extremities  Neuro: AAOx3, CN2-12 grossly intact  Skin: No rashes, warm and dry  Psychiatric: cooperative, pleasant, mood and affect appropriate for age    Lumbar Spine: bilateral lumbar region    Observation:    Normal cervicothoracolumbar curves.    No obvious pelvic obliquity while standing.    No edema, erythema, or ecchymosis noted in lumbosacral region.    No midline skin abnormalities.    No atrophy of lower limb musculature.    Tenderness:   + tenderness throughout the lumbar spine, iliolumbar region, posterior pelvis.  No tenderness over the sacrum, piriformis, greater/lesser trochanters.  No bony deformities or step-offs palpated.     Range of Motion:  Active flexion to 60°.   Active extension to 25°. "   Active rotation to 30° on left and 30° on right.    Active sidebending to 25° on left and 25° on right.  Passive hip flexion to 135° on left and 135° on right.    Passive hip internal rotation to 45° on left and 45° on right.    Passive hip external rotation to 45° on left and 45° on right.    All ROM testing is without pain.    Strength Testing: (*pain)  Hip flexion - 5/5 on left and 5/5 on right  Hip extension - 5/5 on left and 5/5 on right  Knee flexion - 5/5 on left and 5/5 on right  Knee extension - 5/5 on left and 5/5 on right  Dorsiflexion - 5/5 on left and 5/5 on right  Plantarflexion - 5/5 on left and 5/5 on right  Great toe extension - 5/5 on left and 5/5 on right    Special Tests:  Standing Delarosa's test - negative on left and negative on right  Stork test - negative on left and negative on right    Seated straight leg raise - negative on left and negative on right  Supine straight leg raise - negative on left and negative on right  Slump test - negative on left and negative on right  Provocation maneuvers exhibit no worsening of symptoms on left or on right.    ELIZABETH test - negative  FADIR test - negative  Log roll test - negative    Hemal test reveals tight iliopsoas, rectus femoris, and IT band.  + hamstring tightness bilaterally, more pronounced on the left    Neurovascular Exam:  Sensation intact to light touch in the L2-S2 dermatomes bilaterally.   No pretibial edema or abnormal hair pattern of the shin.    Intact and symmetric DP and PT pulses bilaterally.  Normal gait without trendelenberg, heel walking, toe walking, and tandem walking.    Posture:  Upright and Anterior pelvic tilt with increased lumbar lordosis  Gait: Non-antalgic     TART (Tissue texture abnormality, Asymmetry,  Restriction of motion and/or Tenderness) changes:    Head:     Cervical Spine  Thoracic Spine  Lumbar Spine   C1 Neutral T1 Neutral L1 TTAB   C2 Neutral T2 Neutral L2 TTAB   C3 Neutral T3 Neutral L3 TTAB   C4 Neutral T4  Neutral L4 FRSL   C5 Neutral T5 Neutral L5 ERSL   C6 Neutral T6 Neutral     C7 Neutral T7 Neutral       T8 TTAB       T9 TTAB       T10 TTAB       T11 TTAB       T12 TTAB       Ribs:    Upper Extremity:    Abdomen:    Pelvis:  Innominate:Left anterior rotation  Pubic bone:Neutral    Sacrum:Left on Right sacral torsion    Lower Extremity:  External tibial torsion left      Key   F= Flexed   E = Extended   R = Rotated   N = Neutral   S = Sidebent   TTA = tissue texture abnormality   L/R/B (last letter) = left/right/bilateral   HTP = fascial herniated trigger point   TB = fascial trigger band   CD = fascial continuum distortion   MFR = myofascial restriction         IMAGIN. X-ray ordered due to bilateral low back pain  2. X-ray images were reviewed personally by me and then directly with patient.  3. FINDINGS: X-ray images obtained demonstrate no fracture or dislocation. No spondylosis or spondylolisthesis.   4. IMPRESSION: As above.       ASSESSMENT:      ICD-10-CM ICD-9-CM   1. Mechanical low back pain  M54.59 724.2   2. Polyarthralgia  M25.50 719.49   3. Somatic dysfunction of lumbar region  M99.03 739.3   4. Somatic dysfunction of pelvis region  M99.05 739.5   5. Somatic dysfunction of lower extremity  M99.06 739.6   6. Somatic dysfunction of sacral region  M99.04 739.4   7. Somatic dysfunction of thoracic region  M99.02 739.2         PLAN:  1-2. Mechanical LBP/myalgia -   3. Polyarthrlagia -     - Christie presents today for low back pain that has recently started wrapping down her legs. Denies new injury or trauma.    - XRs ordered in the office today and images were personally reviewed with the patient. See above for further detail.    - Symptoms, exam, and imaging are most consistent with mechanical low back pain secondary to myofascial restriction and associated somatic dysfunction.  We discussed the importance of decreasing inflammation and strengthening and stabilizing to help promote and maintain  symptom improvement/resolution.  This is commonly accomplished with a short course of an anti-inflammatory and icing in addition to osteopathic manipulation, a home exercise program or physical therapy.    - Based on her description/body language of pain and somatic dysfunction identified on exam, I discussed osteopathic manipulation as a treatment option today.  She consents to evaluation and treatment.  See below.    - HEP for core and pelvic mobility and strengthening prescribed today. Handouts printed, provided, explained, and exercises were demonstrated as needed. Encouraged to do daily. [id number] HOME EXERCISE PROGRAM (HEP):  The patient was taught a homegoing physical therapy regimen as described above by provider with assistance of sports medicine assistant. The patient demonstrated understanding of the exercises and proper technique of their execution. This process took 15 minutes.     - Labs drawn to rule out autoimmune causes.      4-8. Somatic dysfunction of lumbar, pelvis, sacral, thoracic, lower extremity regions -     - OMT 5-6 regions. Oral consent obtained. Reviewed benefits and potential side effects. OMT indicated today due to signs and symptoms as well as local and remote somatic dysfunction findings and their related neurokinetic, lymphatic, fascial and/or arteriovenous body connections. OMT techniques used: Soft Tissue, Myofascial Release, Muscle Energy, and High Velocity Low Amplitude. Treatment was tolerated well. Improvement noted in segmental mobility post-treatment in dysfunctional regions. There were no adverse events and no complications immediately following treatment. Advised plenty of water to help alleviate soreness.      Future planning includes - possibly more OMT if helpful and if indicated    All questions were answered to the best of my ability and all concerns were addressed at this time.    Follow up in 4-6 weeks for above, or sooner if needed.      This note is dictated using the  M*Modal Fluency Direct word recognition program. There are word recognition mistakes that are occasionally missed on review.                 [1]   Social History  Socioeconomic History    Marital status:     Number of children: 0   Occupational History    Occupation: Sports Medicine;      Employer: [employer]   Tobacco Use    Smoking status: Never     Passive exposure: Never    Smokeless tobacco: Never   Substance and Sexual Activity    Alcohol use: Not Currently     Comment: 2 per year    Drug use: Never    Sexual activity: Yes     Partners: Female     Birth control/protection: None     Social Drivers of Health     Financial Resource Strain: Low Risk  (2/13/2025)    Overall Financial Resource Strain (CARDIA)     Difficulty of Paying Living Expenses: Not hard at all   Food Insecurity: No Food Insecurity (2/13/2025)    Hunger Vital Sign     Worried About Running Out of Food in the Last Year: Never true     Ran Out of Food in the Last Year: Never true   Transportation Needs: No Transportation Needs (2/13/2025)    PRAPARE - Transportation     Lack of Transportation (Medical): No     Lack of Transportation (Non-Medical): No   Physical Activity: Sufficiently Active (2/13/2025)    Exercise Vital Sign     Days of Exercise per Week: 4 days     Minutes of Exercise per Session: 40 min   Stress: No Stress Concern Present (2/13/2025)    [employer] [place] [employer] - Occupational Stress Questionnaire     Feeling of Stress : Not at all   Housing Stability: Low Risk  (2/13/2025)    Housing Stability Vital Sign     Unable to Pay for Housing in the Last Year: No     Number of Times Moved in the Last Year: 0     Homeless in the Last Year: No   [2]   Current Outpatient Medications:     azelastine (ASTELIN) 137 mcg (0.1 %) nasal spray, 1 spray (137 mcg total) by Nasal route 2 (two) times daily., Disp: 30 mL, Rfl: 5    esomeprazole (NEXIUM) 40 MG capsule, Take 1 capsule (40 mg  total) by mouth 2 (two) times daily before meals., Disp: 60 capsule, Rfl: 11    famotidine (PEPCID) 40 MG tablet, Take 1 tablet (40 mg total) by mouth 2 (two) times daily., Disp: 60 tablet, Rfl: 11    metroNIDAZOLE (FLAGYL) 500 MG tablet, Take 1 tablet (500 mg total) by mouth every 12 (twelve) hours., Disp: 14 tablet, Rfl: 0    ondansetron (ZOFRAN-ODT) 4 MG TbDL, Dissolve 1 tablet (4 mg total) by mouth 2 (two) times daily., Disp: 60 tablet, Rfl: 3    rimegepant (NURTEC) 75 mg odt, Take 1 tablet (75 mg total) by mouth daily as needed for Migraine. Place ODT tablet on the tongue; alternatively the ODT tablet may be placed under the tongue, Disp: 8 tablet, Rfl: 2    triamcinolone (NASACORT) 55 mcg nasal inhaler, 2 sprays by Nasal route once daily., Disp: 16.9 mL, Rfl: 5

## 2025-08-05 ENCOUNTER — OFFICE VISIT (OUTPATIENT)
Dept: OTOLARYNGOLOGY | Facility: CLINIC | Age: 40
End: 2025-08-05
Payer: COMMERCIAL

## 2025-08-05 VITALS
HEIGHT: 66 IN | SYSTOLIC BLOOD PRESSURE: 104 MMHG | DIASTOLIC BLOOD PRESSURE: 71 MMHG | HEART RATE: 78 BPM | BODY MASS INDEX: 27.28 KG/M2 | WEIGHT: 169.75 LBS

## 2025-08-05 DIAGNOSIS — J01.00 ACUTE NON-RECURRENT MAXILLARY SINUSITIS: Primary | ICD-10-CM

## 2025-08-05 DIAGNOSIS — J30.9 ALLERGIC RHINITIS, UNSPECIFIED SEASONALITY, UNSPECIFIED TRIGGER: Chronic | ICD-10-CM

## 2025-08-05 DIAGNOSIS — J34.2 DEVIATED NASAL SEPTUM: Chronic | ICD-10-CM

## 2025-08-05 DIAGNOSIS — J32.0 CHRONIC MAXILLARY SINUSITIS: Chronic | ICD-10-CM

## 2025-08-05 DIAGNOSIS — J34.3 HYPERTROPHY OF BOTH INFERIOR NASAL TURBINATES: Chronic | ICD-10-CM

## 2025-08-05 PROCEDURE — 3078F DIAST BP <80 MM HG: CPT | Mod: CPTII,S$GLB,, | Performed by: OTOLARYNGOLOGY

## 2025-08-05 PROCEDURE — 99999 PR PBB SHADOW E&M-EST. PATIENT-LVL III: CPT | Mod: PBBFAC,,, | Performed by: OTOLARYNGOLOGY

## 2025-08-05 PROCEDURE — 1160F RVW MEDS BY RX/DR IN RCRD: CPT | Mod: CPTII,S$GLB,, | Performed by: OTOLARYNGOLOGY

## 2025-08-05 PROCEDURE — 99214 OFFICE O/P EST MOD 30 MIN: CPT | Mod: S$GLB,,, | Performed by: OTOLARYNGOLOGY

## 2025-08-05 PROCEDURE — 3074F SYST BP LT 130 MM HG: CPT | Mod: CPTII,S$GLB,, | Performed by: OTOLARYNGOLOGY

## 2025-08-05 PROCEDURE — 1159F MED LIST DOCD IN RCRD: CPT | Mod: CPTII,S$GLB,, | Performed by: OTOLARYNGOLOGY

## 2025-08-05 PROCEDURE — 3008F BODY MASS INDEX DOCD: CPT | Mod: CPTII,S$GLB,, | Performed by: OTOLARYNGOLOGY

## 2025-08-05 RX ORDER — CEFDINIR 300 MG/1
300 CAPSULE ORAL 2 TIMES DAILY
Qty: 20 CAPSULE | Refills: 0 | Status: SHIPPED | OUTPATIENT
Start: 2025-08-05 | End: 2025-08-15

## 2025-08-05 NOTE — PATIENT INSTRUCTIONS
Gave Rx for cefdinir if sinusitis symptoms worsen over next few days.     Referral to Allergy for workup.     Continue nasal sprays daily, use saline sinus rinse.

## 2025-08-05 NOTE — PROGRESS NOTES
Chief Complaint   Patient presents with    Follow-up     Pt stated she still has some nasal drippings        HPI:       Christie Bay is a 39 y.o. female who is referred by Eliane Paredes NP for evaluation of chronic sinusitis/nasal congestion. She a several week(s) history of nasal congestion and postnasal drip. She describes difficulty breathing at night. There is bilateral nasal obstruction, alternate.She does not use sinus rinses or nasal sprays. She has midface pain and pressure.  She has rhinorrhea and postnasal drip. There is not maxillary tooth pain. She denies headaches.  She has not had sinus or nasal surgery. There is no history of sinonasal trauma.  She does have frequent coughing and throat clearing and does feel that the voice has been affected by the chronic postnasal drip.  She has globus sensation as well.  She has not been diagnosed with allergic rhinitis.  She started with URI symptoms a few weeks ago and they have waxed and waned.  She has not had antibiotics or other treatments for this.  She has used Flonase and nasal saline in the past but none currently.    She does not have a history of nasal allergies, eczema, asthma.  She has not had allergy testing.  She does have small children that go to  and school and they have been sick a lot recently.  She has no pets, no carpeting, no construction or mold remediation in the house, and she is not exposed to smoking or vaping.    She does have a history of acid reflux and has recently taken Prilosec for that.  Started with URI a few weeks ago and have waxed and waned over last few weeks.  No abx or other treatemnts for this.    Has used flonase and saline NS in the past.                Interval HPI 12/05/2024 :      Reports she completed the round of antibiotics she was given at her last visit, and then started with new symptoms subsequent to that.  She started having tooth pain and was seen by her dentist and then an  endodontist who noted infection in the tooth requiring revision root canal, as the tooth had been previously worked on many years ago.  After having the dental work and completing another course of antibiotics, the sinus symptoms have resolved back to baseline.    She still has some ongoing nasal congestion, tends to be worse at night and 1st thing in the morning.  She is using the Nasacort but is only doing 1 spray each nostril daily.  Overall she does feel symptoms are better, but still feels that her nasal breathing is not optimal.    She has continued taking prilosec as well.     Interval HPI 04/03/2025 :      Symptoms fairly well-controlled.  She still uses Nasacort daily.  We attempted ordering Ryaltris, but the cost was too high and she decided to continue Nasacort.  She still gets intermittent symptoms of postnasal drip, but overall better than previous.    Interval HPI 08/05/2025 :    CHIEF COMPLAINT:  - Patient presents for a follow-up visit to discuss recurrent viral illnesses and ongoing sinus problems.    HPI:  Patient reports recurrent viral illnesses occurring approximately every other month. Her current episode has improved significantly, with a recent mild sore throat. Nasal sprays have been helpful in managing her symptoms, resulting in noticeable improvement. She has a history of sinus problems, with a previous sinus CT showing minimal blockage around the left sinus opening as well as deviated septum. Her recurrent illnesses are attributed to exposure from her children, who frequently bring home viral infections.  We have previously discussed septoplasty and turbinate reduction if she desires.  She has not seen Allergy/immunology for any workup.  She has had some sinus pressure and tenderness as well as tooth pain, but this is improving.  She does have some ongoing postnasal drip and cough but this is also improving.  She continues to use her nasal sprays and is using a sinus rinse daily  currently.          Past Medical History:   Diagnosis Date    GERD (gastroesophageal reflux disease)     History of shingles     Joint pain 2006    Shoulder sx 2006, Knee sx 2007    Migraine     Migraine headache     Ulcer 2008     Social History     Socioeconomic History    Marital status:     Number of children: 0   Occupational History    Occupation: Sports Medicine;      Employer: [employer]   Tobacco Use    Smoking status: Never     Passive exposure: Never    Smokeless tobacco: Never   Substance and Sexual Activity    Alcohol use: Not Currently     Comment: 2 per year    Drug use: Never    Sexual activity: Yes     Partners: Female     Birth control/protection: None     Social Drivers of Health     Financial Resource Strain: Low Risk  (2/13/2025)    Overall Financial Resource Strain (CARDIA)     Difficulty of Paying Living Expenses: Not hard at all   Food Insecurity: No Food Insecurity (2/13/2025)    Hunger Vital Sign     Worried About Running Out of Food in the Last Year: Never true     Ran Out of Food in the Last Year: Never true   Transportation Needs: No Transportation Needs (2/13/2025)    PRAPARE - Transportation     Lack of Transportation (Medical): No     Lack of Transportation (Non-Medical): No   Physical Activity: Sufficiently Active (2/13/2025)    Exercise Vital Sign     Days of Exercise per Week: 4 days     Minutes of Exercise per Session: 40 min   Stress: No Stress Concern Present (2/13/2025)    [employer] [place] [employer] - Occupational Stress Questionnaire     Feeling of Stress : Not at all   Housing Stability: Low Risk  (2/13/2025)    Housing Stability Vital Sign     Unable to Pay for Housing in the Last Year: No     Number of Times Moved in the Last Year: 0     Homeless in the Last Year: No     Past Surgical History:   Procedure Laterality Date    CHOLECYSTECTOMY  2008    ESOPHAGOGASTRODUODENOSCOPY N/A 04/14/2025    Procedure: EGD  (ESOPHAGOGASTRODUODENOSCOPY);  Surgeon: Krupa Oliveros MD;  Location: [facility] ENDOSCOPY;  Service: Gastroenterology;  Laterality: N/A;  molina/portal/benedict   Precall complete;patient confirmed;MB    KNEE SURGERY Left     SHOULDER SURGERY Right      Family History   Problem Relation Name Age of Onset    Cancer Mother Yuliet         Skin    Migraines Mother Yuliet     Eczema Mother Yuliet     Psoriasis Mother Yuliet     Melanoma Mother Yuliet     Asthma Mother Yuliet     Allergies Mother Yuliet     Hypertension Father Mayo     Kidney cancer Maternal Grandmother Anjana 50    Diabetes Maternal Grandmother Anjana     Cancer Maternal Grandmother Anjana         Breast    Heart disease Maternal Grandmother Anjana     Myelodysplastic syndrome Paternal Grandmother Tanya 64    Cancer Paternal Grandmother Tanya         Blood    Brain cancer Paternal Grandfather Mark 60    Cancer Paternal Grandfather Mark         Brain    Acne Brother Bill     Acne Brother Bill     Ovarian cancer Neg Hx      Colon cancer Neg Hx      Eclampsia Neg Hx      Miscarriages / Stillbirths Neg Hx       labor Neg Hx      Stroke Neg Hx             Review of Systems  General: negative for chills, fever or weight loss  Psychological: negative for mood changes or depression  Ophthalmic: negative for blurry vision, photophobia or eye pain  ENT: see HPI  Respiratory: no cough, shortness of breath, or wheezing  Cardiovascular: no chest pain or dyspnea on exertion  Gastrointestinal: no abdominal pain, change in bowel habits, or black/ bloody stools  Musculoskeletal: negative for gait disturbance or muscular weakness  Neurological: no syncope or seizures; no ataxia  Dermatological: negative for pruritis,  rash and jaundice  Hematologic/lymphatic: no easy bruising, no new adenopathy      Physical Exam:    Vitals:    / 0802   BP: 104/71   Pulse: 78           Constitutional:   She is oriented to person, place, and time. Vital signs are normal. She appears  well-developed and well-nourished. She appears alert. She is cooperative. Normal speech.      Head:  Normocephalic and atraumatic. Salivary glands normal.  Facial strength is normal.      Ears:    Right Ear: Tympanic membrane is not erythematous. No middle ear effusion.   Left Ear: Tympanic membrane is not erythematous.  No middle ear effusion.     Nose:  Mucosal edema (Mild, improved overall) and septal deviation (slight) present. No rhinorrhea or polyps. Turbinate hypertrophy (3+, boggy, congested mucosa).  Turbinates normal.  Right sinus exhibits maxillary sinus tenderness (Mild). Right sinus exhibits no frontal sinus tenderness. Left sinus exhibits maxillary sinus tenderness (mild). Left sinus exhibits no frontal sinus tenderness.     Mouth/Throat  Oropharynx clear and moist without lesions or asymmetry, normal uvula midline, lips, teeth, and gums normal and oropharynx normal. No uvula swelling, oral lesions, trismus or mucous membrane lesions. No oropharyngeal exudate, posterior oropharyngeal edema or posterior oropharyngeal erythema. Tonsils present, +1.  Mirror exam not performed due to patient tolerance.  Mirror exam not performed due to patient tolerance.      Neck:  Neck normal without thyromegaly masses, asymmetry, normal tracheal structure, crepitus, and tenderness, thyroid normal, trachea normal, phonation normal, full range of motion with neck supple and no adenopathy. No JVD present. Carotid bruit is not present. No thyroid mass and no thyromegaly present.     She has no cervical adenopathy.     Cardiovascular:    Normal rate, regular rhythm and rate and rhythm, heart sounds, and pulses normal.              Pulmonary/Chest:   Effort and breath sounds normal.     Psychiatric:   She has a normal mood and affect. Her speech is normal and behavior is normal.     Neurological:   She is alert and oriented to person, place, and time. She has neurological normal, alert and oriented. No cranial nerve deficit.      Skin:   No abrasions, lacerations, lesions, or rashes.       CT MEDTRONIC SINUSES WITHOUT-     CLINICAL HISTORY:  Chronic sinus issues.  Nasal congestion/blockage and postnasal drip.  History of infected tooth.     TECHNIQUE:  Noncontrast CT imaging the sinuses was performed utilizing ecoVent protocol.  Sagittal and coronal reformats were created.     COMPARISON:  None     FINDINGS:  There is nasal septal deviation to the right.  No nasal polyp formation is identified.  Paradoxical turn of the left middle turbinate.     The ostiomeatal complex on the right is patent.  Mucosal thickening narrows the left maxillary ostium and infundibulum.     The paranasal sinuses are currently clear.     Evaluation for developmental variation demonstrates: Pneumatization of the Kimberli yayo, minimal pneumatization overlying the groove of the left anterior ethmoid artery, the sphenoid sinus septations insert on the carotid canals without dehiscence, and the sphenoid sinus is pneumatized below the sella.     No acute odontogenic periapical abscess formation in the maxilla is suggested.  No mucosal thickening along the floor of the maxillary sinuses.     The middle ears and mastoid air cells are clear.     No acute intracranial process.     Impression:     Nasal septal deviation to the right.     Mucosal thickening compromises the left ostiomeatal complex.     The paranasal sinuses are currently clear.                Assessment:    ICD-10-CM ICD-9-CM    1. Acute non-recurrent maxillary sinusitis  J01.00 461.0 cefdinir (OMNICEF) 300 MG capsule      2. Chronic maxillary sinusitis  J32.0 473.0 Ambulatory referral/consult to Allergy      cefdinir (OMNICEF) 300 MG capsule      3. Allergic rhinitis, unspecified seasonality, unspecified trigger  J30.9 477.9 Ambulatory referral/consult to Allergy      4. Deviated nasal septum  J34.2 470       5. Hypertrophy of both inferior nasal turbinates  J34.3 478.0               The primary  encounter diagnosis was Acute non-recurrent maxillary sinusitis. Diagnoses of Chronic maxillary sinusitis, Allergic rhinitis, unspecified seasonality, unspecified trigger, Deviated nasal septum, and Hypertrophy of both inferior nasal turbinates were also pertinent to this visit.      Plan:  Assessment & Plan    - Assessed recurrent viral infections, noting they are common with young children due to frequent exposure but monitoring for progression to prolonged sinus infections.  - Considered effectiveness of current nasal sprays in preventing recurrent sinus infections and helping recover from viral infections.  - Continued nasal sprays as currently prescribed.  - Reviewed previous sinus CT   - continue using saline sinus rinses daily  - Evaluated potential benefits of septum procedure for improving overall airflow and medication utilization  - Considered referral to allergy/immunology for further workup of potential allergy issues.   - gave prescription for cefdinir to use if current symptoms worsen in the next few days.        Selene Oneal MD      This note was generated with the assistance of ambient listening technology. Verbal consent was obtained by the patient and accompanying visitor(s) for the recording of patient appointment to facilitate this note. I attest to having reviewed and edited the generated note for accuracy, though some syntax or spelling errors may persist. Please contact the author of this note for any clarification.

## 2025-08-11 ENCOUNTER — OFFICE VISIT (OUTPATIENT)
Dept: ALLERGY | Facility: CLINIC | Age: 40
End: 2025-08-11
Payer: COMMERCIAL

## 2025-08-11 ENCOUNTER — LAB VISIT (OUTPATIENT)
Dept: LAB | Facility: HOSPITAL | Age: 40
End: 2025-08-11
Attending: STUDENT IN AN ORGANIZED HEALTH CARE EDUCATION/TRAINING PROGRAM
Payer: COMMERCIAL

## 2025-08-11 VITALS — HEIGHT: 66 IN | WEIGHT: 173.31 LBS | BODY MASS INDEX: 27.85 KG/M2

## 2025-08-11 DIAGNOSIS — J32.9 RECURRENT SINUSITIS: ICD-10-CM

## 2025-08-11 DIAGNOSIS — J31.0 CHRONIC RHINITIS: ICD-10-CM

## 2025-08-11 DIAGNOSIS — J31.0 CHRONIC RHINITIS: Primary | ICD-10-CM

## 2025-08-11 LAB
IGA SERPL-MCNC: 123 MG/DL (ref 40–350)
IGG SERPL-MCNC: 1107 MG/DL (ref 650–1600)
IGM SERPL-MCNC: 187 MG/DL (ref 50–300)

## 2025-08-11 PROCEDURE — 82784 ASSAY IGA/IGD/IGG/IGM EACH: CPT | Mod: 59

## 2025-08-11 PROCEDURE — 99203 OFFICE O/P NEW LOW 30 MIN: CPT | Mod: S$GLB,,, | Performed by: STUDENT IN AN ORGANIZED HEALTH CARE EDUCATION/TRAINING PROGRAM

## 2025-08-11 PROCEDURE — 86003 ALLG SPEC IGE CRUDE XTRC EA: CPT | Mod: 59

## 2025-08-11 PROCEDURE — 99999 PR PBB SHADOW E&M-EST. PATIENT-LVL III: CPT | Mod: PBBFAC,,, | Performed by: STUDENT IN AN ORGANIZED HEALTH CARE EDUCATION/TRAINING PROGRAM

## 2025-08-11 PROCEDURE — 1160F RVW MEDS BY RX/DR IN RCRD: CPT | Mod: CPTII,S$GLB,, | Performed by: STUDENT IN AN ORGANIZED HEALTH CARE EDUCATION/TRAINING PROGRAM

## 2025-08-11 PROCEDURE — 1159F MED LIST DOCD IN RCRD: CPT | Mod: CPTII,S$GLB,, | Performed by: STUDENT IN AN ORGANIZED HEALTH CARE EDUCATION/TRAINING PROGRAM

## 2025-08-11 PROCEDURE — 36415 COLL VENOUS BLD VENIPUNCTURE: CPT

## 2025-08-11 PROCEDURE — 86581 STRPTCS PNEUM ANTB SEROT IA: CPT

## 2025-08-11 PROCEDURE — 86003 ALLG SPEC IGE CRUDE XTRC EA: CPT

## 2025-08-11 PROCEDURE — 3008F BODY MASS INDEX DOCD: CPT | Mod: CPTII,S$GLB,, | Performed by: STUDENT IN AN ORGANIZED HEALTH CARE EDUCATION/TRAINING PROGRAM

## 2025-08-11 RX ORDER — TRIAMCINOLONE ACETONIDE 55 UG/1
2 SPRAY, METERED NASAL DAILY
Qty: 16.9 ML | Refills: 5 | Status: SHIPPED | OUTPATIENT
Start: 2025-08-11

## 2025-08-14 LAB
W ALTERNARIA ALTERNATA, CLASS: NORMAL
W ALTERNARIA ALTERNATA, IGE: <0.1 KU/L
W ASPERGILLUS FUMIGATUS, CLASS: NORMAL
W ASPERGILLUS FUMIGATUS, IGE: <0.1 KU/L
W BAHIA GRASS, CLASS: NORMAL
W BAHIA GRASS, IGE: <0.1 KU/L
W BERMUDA GRASS, CLASS: NORMAL
W BERMUDA GRASS, IGE: <0.1 KU/L
W CAT DANDER, CLASS: NORMAL
W CAT DANDER, IGE: <0.1 KU/L
W CEDAR, CLASS: NORMAL
W CEDAR, IGE: <0.1 KU/L
W COMMON PIGWEED, CLASS: NORMAL
W COMMON PIGWEED, IGE: <0.1 KU/L
W COTTONWOOD, CLASS: NORMAL
W COTTONWOOD, IGE: <0.1 KU/L
W DERMATOPHAGOIDES FARINAE CLASS: NORMAL
W DERMATOPHAGOIDES FARINAE, IGE: <0.1 KU/L
W DERMATOPHAGOIDES PTERONYSSINUS CLASS: NORMAL
W DERMATOPHAGOIDES PTERONYSSINUS, IGE: <0.1 KU/L
W DOG DANDER, CLASS: NORMAL
W DOG DANDER, IGE: <0.1 KU/L
W ENGLISH PLANTAIN, RIBWORT, CLASS: NORMAL
W ENGLISH PLANTAIN, RIBWORT, IGE: <0.1 KU/L
W JOHNSON GRASS, CLASS: NORMAL
W JOHNSON GRASS, IGE: <0.1 KU/L
W MAPLE (BOX ELDER), CLASS: NORMAL
W MAPLE (BOX ELDER), IGE: <0.1 KU/L
W OAK, CLASS: NORMAL
W OAK, IGE: <0.1 KU/L
W PECAN, HICKORY, CLASS: NORMAL
W PECAN, HICKORY, IGE: <0.1 KU/L
W ROUGH MARSHELDER, CLASS: NORMAL
W ROUGH MARSHELDER, IGE: <0.1 KU/L
W SALTWORT, RUSSIAN THISTLE, CLASS: NORMAL
W SALTWORT, RUSSIAN THISTLE, IGE: <0.1 KU/L
W SHEEP SORREL, CLASS: NORMAL
W SHEEP SORREL, IGE: <0.1 KU/L
W TIMOTHY GRASS, CLASS: NORMAL
W TIMOTHY GRASS, IGE: <0.1 KU/L
W WESTERN RAGWEED, CLASS: NORMAL
W WESTERN RAGWEED, IGE: <0.1 KU/L
W WHITE ASH, CLASS: NORMAL
W WHITE ASH, IGE: <0.1 KU/L

## 2025-08-15 LAB
IMMUNOLOGIST REVIEW: NORMAL
S PN DA SERO 19F IGG SER-MCNC: 0.8 MCG/ML
S PNEUM DA 1 IGG SER-MCNC: 0.1 MCG/ML
S PNEUM DA 10A IGG SER-MCNC: 0.4 MCG/ML
S PNEUM DA 11A IGG SER-MCNC: 2.6 MCG/ML
S PNEUM DA 12F IGG SER-MCNC: 0.1 MCG/ML
S PNEUM DA 14 IGG SER-MCNC: <0.1 MCG/ML
S PNEUM DA 15B IGG SER-MCNC: 0.5 MCG/ML
S PNEUM DA 17F IGG SER-MCNC: 0.5 MCG/ML
S PNEUM DA 18C IGG SER-MCNC: 0.1 MCG/ML
S PNEUM DA 19A IGG SER-MCNC: 0.6 MCG/ML
S PNEUM DA 2 IGG SER-MCNC: 0.1 MCG/ML
S PNEUM DA 20A IGG SER-MCNC: 0.9 MCG/ML
S PNEUM DA 22F IGG SER-MCNC: 0.5 MCG/ML
S PNEUM DA 23F IGG SER-MCNC: 0.3 MCG/ML
S PNEUM DA 3 IGG SER-MCNC: 0.1 MCG/ML
S PNEUM DA 33F IGG SER-MCNC: 1.2 MCG/ML
S PNEUM DA 4 IGG SER-MCNC: 0.1 MCG/ML
S PNEUM DA 5 IGG SER-MCNC: <0.1 MCG/ML
S PNEUM DA 6B IGG SER-MCNC: 0.1 MCG/ML
S PNEUM DA 7F IGG SER-MCNC: 0.3 MCG/ML
S PNEUM DA 8 IGG SER-MCNC: 0.4 MCG/ML
S PNEUM DA 9N IGG SER-MCNC: 0.2 MCG/ML
S PNEUM DA 9V IGG SER-MCNC: 0.1 MCG/ML

## 2025-08-18 ENCOUNTER — PATIENT MESSAGE (OUTPATIENT)
Dept: GASTROENTEROLOGY | Facility: CLINIC | Age: 40
End: 2025-08-18
Payer: COMMERCIAL

## 2025-08-18 ENCOUNTER — PATIENT MESSAGE (OUTPATIENT)
Dept: ALLERGY | Facility: CLINIC | Age: 40
End: 2025-08-18
Payer: COMMERCIAL

## 2025-08-18 DIAGNOSIS — J32.9 RECURRENT SINUSITIS: Primary | ICD-10-CM

## 2025-08-18 DIAGNOSIS — R76.0 ABNORMAL ANTIBODY TITER: ICD-10-CM

## 2025-08-19 ENCOUNTER — OFFICE VISIT (OUTPATIENT)
Dept: OBSTETRICS AND GYNECOLOGY | Facility: CLINIC | Age: 40
End: 2025-08-19
Payer: COMMERCIAL

## 2025-08-19 VITALS
HEIGHT: 66 IN | DIASTOLIC BLOOD PRESSURE: 72 MMHG | WEIGHT: 171.06 LBS | BODY MASS INDEX: 27.49 KG/M2 | SYSTOLIC BLOOD PRESSURE: 106 MMHG

## 2025-08-19 DIAGNOSIS — R10.13 EPIGASTRIC PAIN: ICD-10-CM

## 2025-08-19 DIAGNOSIS — K21.9 GASTROESOPHAGEAL REFLUX DISEASE, UNSPECIFIED WHETHER ESOPHAGITIS PRESENT: Primary | ICD-10-CM

## 2025-08-19 DIAGNOSIS — R19.7 DIARRHEA, UNSPECIFIED TYPE: ICD-10-CM

## 2025-08-19 DIAGNOSIS — R23.2 HOT FLASHES: Primary | ICD-10-CM

## 2025-08-19 DIAGNOSIS — Z78.0 MENOPAUSE: ICD-10-CM

## 2025-08-19 PROCEDURE — 99214 OFFICE O/P EST MOD 30 MIN: CPT | Mod: S$GLB,,, | Performed by: OBSTETRICS & GYNECOLOGY

## 2025-08-19 PROCEDURE — 3074F SYST BP LT 130 MM HG: CPT | Mod: CPTII,S$GLB,, | Performed by: OBSTETRICS & GYNECOLOGY

## 2025-08-19 PROCEDURE — 3078F DIAST BP <80 MM HG: CPT | Mod: CPTII,S$GLB,, | Performed by: OBSTETRICS & GYNECOLOGY

## 2025-08-19 PROCEDURE — 99999 PR PBB SHADOW E&M-EST. PATIENT-LVL III: CPT | Mod: PBBFAC,,, | Performed by: OBSTETRICS & GYNECOLOGY

## 2025-08-19 PROCEDURE — 3008F BODY MASS INDEX DOCD: CPT | Mod: CPTII,S$GLB,, | Performed by: OBSTETRICS & GYNECOLOGY

## 2025-08-19 PROCEDURE — 1159F MED LIST DOCD IN RCRD: CPT | Mod: CPTII,S$GLB,, | Performed by: OBSTETRICS & GYNECOLOGY

## 2025-08-19 RX ORDER — PROGESTERONE 100 MG/1
100 CAPSULE ORAL NIGHTLY
Qty: 30 CAPSULE | Refills: 11 | Status: SHIPPED | OUTPATIENT
Start: 2025-08-19 | End: 2026-08-19

## 2025-08-19 RX ORDER — PANTOPRAZOLE SODIUM 40 MG/1
40 TABLET, DELAYED RELEASE ORAL 2 TIMES DAILY
Qty: 60 TABLET | Refills: 11 | Status: SHIPPED | OUTPATIENT
Start: 2025-08-19 | End: 2026-08-19

## 2025-08-20 ENCOUNTER — CLINICAL SUPPORT (OUTPATIENT)
Dept: ALLERGY | Facility: CLINIC | Age: 40
End: 2025-08-20
Payer: COMMERCIAL

## 2025-08-20 DIAGNOSIS — J32.9 RECURRENT SINUSITIS: Primary | ICD-10-CM

## 2025-08-20 DIAGNOSIS — R76.0 ABNORMAL ANTIBODY TITER: ICD-10-CM

## 2025-08-20 PROCEDURE — 83993 ASSAY FOR CALPROTECTIN FECAL: CPT

## 2025-08-20 PROCEDURE — 87046 STOOL CULTR AEROBIC BACT EA: CPT

## 2025-08-20 PROCEDURE — 87046 STOOL CULTR AEROBIC BACT EA: CPT | Mod: 59

## 2025-08-20 PROCEDURE — 90471 IMMUNIZATION ADMIN: CPT | Mod: S$GLB,,, | Performed by: STUDENT IN AN ORGANIZED HEALTH CARE EDUCATION/TRAINING PROGRAM

## 2025-08-20 PROCEDURE — 87328 CRYPTOSPORIDIUM AG IA: CPT

## 2025-08-20 PROCEDURE — 90732 PPSV23 VACC 2 YRS+ SUBQ/IM: CPT | Mod: S$GLB,,, | Performed by: STUDENT IN AN ORGANIZED HEALTH CARE EDUCATION/TRAINING PROGRAM

## 2025-08-20 PROCEDURE — 82653 EL-1 FECAL QUANTITATIVE: CPT

## 2025-08-20 PROCEDURE — 87338 HPYLORI STOOL AG IA: CPT

## 2025-08-20 PROCEDURE — 87427 SHIGA-LIKE TOXIN AG IA: CPT

## 2025-08-21 ENCOUNTER — OFFICE VISIT (OUTPATIENT)
Dept: INTERNAL MEDICINE | Facility: CLINIC | Age: 40
End: 2025-08-21
Payer: COMMERCIAL

## 2025-08-21 VITALS
WEIGHT: 170.19 LBS | OXYGEN SATURATION: 98 % | SYSTOLIC BLOOD PRESSURE: 104 MMHG | HEIGHT: 65 IN | HEART RATE: 91 BPM | BODY MASS INDEX: 28.36 KG/M2 | DIASTOLIC BLOOD PRESSURE: 66 MMHG

## 2025-08-21 DIAGNOSIS — R07.89 OTHER CHEST PAIN: Primary | ICD-10-CM

## 2025-08-21 DIAGNOSIS — K21.9 GASTROESOPHAGEAL REFLUX DISEASE, UNSPECIFIED WHETHER ESOPHAGITIS PRESENT: ICD-10-CM

## 2025-08-21 DIAGNOSIS — R10.13 EPIGASTRIC PAIN: ICD-10-CM

## 2025-08-21 DIAGNOSIS — G43.709 CHRONIC MIGRAINE WITHOUT AURA WITHOUT STATUS MIGRAINOSUS, NOT INTRACTABLE: ICD-10-CM

## 2025-08-21 LAB
OHS QRS DURATION: 80 MS
OHS QTC CALCULATION: 421 MS

## 2025-08-21 PROCEDURE — 99999 PR PBB SHADOW E&M-EST. PATIENT-LVL IV: CPT | Mod: PBBFAC,,, | Performed by: INTERNAL MEDICINE

## 2025-08-21 PROCEDURE — 93010 ELECTROCARDIOGRAM REPORT: CPT | Mod: S$GLB,,, | Performed by: STUDENT IN AN ORGANIZED HEALTH CARE EDUCATION/TRAINING PROGRAM

## 2025-08-22 ENCOUNTER — TELEPHONE (OUTPATIENT)
Dept: ENDOSCOPY | Facility: HOSPITAL | Age: 40
End: 2025-08-22
Payer: COMMERCIAL

## 2025-08-22 ENCOUNTER — PATIENT MESSAGE (OUTPATIENT)
Dept: GASTROENTEROLOGY | Facility: CLINIC | Age: 40
End: 2025-08-22
Payer: COMMERCIAL

## 2025-08-22 DIAGNOSIS — Z12.11 SPECIAL SCREENING FOR MALIGNANT NEOPLASMS, COLON: ICD-10-CM

## 2025-08-22 DIAGNOSIS — R19.5 ELEVATED FECAL CALPROTECTIN: ICD-10-CM

## 2025-08-22 DIAGNOSIS — R19.7 DIARRHEA, UNSPECIFIED TYPE: Primary | ICD-10-CM

## 2025-08-22 RX ORDER — SODIUM, POTASSIUM,MAG SULFATES 17.5-3.13G
1 SOLUTION, RECONSTITUTED, ORAL ORAL DAILY
Qty: 1 KIT | Refills: 0 | Status: SHIPPED | OUTPATIENT
Start: 2025-08-22 | End: 2025-08-25

## 2025-08-28 ENCOUNTER — ANESTHESIA EVENT (OUTPATIENT)
Dept: ENDOSCOPY | Facility: HOSPITAL | Age: 40
End: 2025-08-28
Payer: COMMERCIAL

## 2025-08-28 ENCOUNTER — HOSPITAL ENCOUNTER (OUTPATIENT)
Facility: HOSPITAL | Age: 40
Discharge: HOME | End: 2025-08-28
Attending: INTERNAL MEDICINE | Admitting: INTERNAL MEDICINE
Payer: COMMERCIAL

## 2025-08-28 ENCOUNTER — ANESTHESIA (OUTPATIENT)
Dept: ENDOSCOPY | Facility: HOSPITAL | Age: 40
End: 2025-08-28
Payer: COMMERCIAL

## 2025-08-28 VITALS
RESPIRATION RATE: 20 BRPM | HEART RATE: 74 BPM | OXYGEN SATURATION: 100 % | DIASTOLIC BLOOD PRESSURE: 69 MMHG | TEMPERATURE: 98 F | SYSTOLIC BLOOD PRESSURE: 106 MMHG

## 2025-08-28 DIAGNOSIS — Z12.11 SPECIAL SCREENING FOR MALIGNANT NEOPLASMS, COLON: ICD-10-CM

## 2025-08-28 DIAGNOSIS — R19.7 DIARRHEA, UNSPECIFIED TYPE: Primary | ICD-10-CM

## 2025-08-28 DIAGNOSIS — R19.5 ELEVATED FECAL CALPROTECTIN: ICD-10-CM

## 2025-08-28 LAB
B-HCG UR QL: NEGATIVE
CTP QC/QA: YES

## 2025-08-28 PROCEDURE — 37000008 HC ANESTHESIA 1ST 15 MINUTES: Performed by: INTERNAL MEDICINE

## 2025-08-28 PROCEDURE — 99900035 HC TECH TIME PER 15 MIN (STAT)

## 2025-08-28 PROCEDURE — 81025 URINE PREGNANCY TEST: CPT | Performed by: INTERNAL MEDICINE

## 2025-08-28 PROCEDURE — 63600175 PHARM REV CODE 636 W HCPCS: Performed by: NURSE ANESTHETIST, CERTIFIED REGISTERED

## 2025-08-28 PROCEDURE — 45380 COLONOSCOPY AND BIOPSY: CPT | Performed by: INTERNAL MEDICINE

## 2025-08-28 PROCEDURE — 94761 N-INVAS EAR/PLS OXIMETRY MLT: CPT

## 2025-08-28 PROCEDURE — 45380 COLONOSCOPY AND BIOPSY: CPT | Mod: ,,, | Performed by: INTERNAL MEDICINE

## 2025-08-28 PROCEDURE — 88305 TISSUE EXAM BY PATHOLOGIST: CPT | Mod: TC | Performed by: PATHOLOGY

## 2025-08-28 PROCEDURE — 25000003 PHARM REV CODE 250: Performed by: INTERNAL MEDICINE

## 2025-08-28 PROCEDURE — 37000009 HC ANESTHESIA EA ADD 15 MINS: Performed by: INTERNAL MEDICINE

## 2025-08-28 PROCEDURE — 27201012 HC FORCEPS, HOT/COLD, DISP: Performed by: INTERNAL MEDICINE

## 2025-08-28 RX ORDER — SODIUM CHLORIDE 9 MG/ML
INJECTION, SOLUTION INTRAVENOUS CONTINUOUS
Status: DISCONTINUED | OUTPATIENT
Start: 2025-08-28 | End: 2025-08-28 | Stop reason: HOSPADM

## 2025-08-28 RX ORDER — LIDOCAINE HYDROCHLORIDE 20 MG/ML
INJECTION, SOLUTION EPIDURAL; INFILTRATION; INTRACAUDAL; PERINEURAL
Status: DISCONTINUED | OUTPATIENT
Start: 2025-08-28 | End: 2025-08-28

## 2025-08-28 RX ORDER — PROPOFOL 10 MG/ML
VIAL (ML) INTRAVENOUS CONTINUOUS PRN
Status: DISCONTINUED | OUTPATIENT
Start: 2025-08-28 | End: 2025-08-28

## 2025-08-28 RX ORDER — PROPOFOL 10 MG/ML
VIAL (ML) INTRAVENOUS
Status: DISCONTINUED | OUTPATIENT
Start: 2025-08-28 | End: 2025-08-28

## 2025-08-28 RX ADMIN — LIDOCAINE HYDROCHLORIDE 50 MG: 20 INJECTION, SOLUTION EPIDURAL; INFILTRATION; INTRACAUDAL; PERINEURAL at 07:08

## 2025-08-28 RX ADMIN — PROPOFOL 100 MG: 10 INJECTION, EMULSION INTRAVENOUS at 07:08

## 2025-08-28 RX ADMIN — PROPOFOL 50 MG: 10 INJECTION, EMULSION INTRAVENOUS at 07:08

## 2025-08-28 RX ADMIN — PROPOFOL 150 MCG/KG/MIN: 10 INJECTION, EMULSION INTRAVENOUS at 07:08

## 2025-08-28 RX ADMIN — SODIUM CHLORIDE: 0.9 INJECTION, SOLUTION INTRAVENOUS at 07:08

## 2025-08-29 LAB
DHEA SERPL-MCNC: NORMAL
ESTROGEN SERPL-MCNC: NORMAL PG/ML
INSULIN SERPL-ACNC: NORMAL U[IU]/ML
LAB AP CLINICAL INFORMATION: NORMAL
LAB AP GROSS DESCRIPTION: NORMAL
LAB AP PERFORMING LOCATION(S): NORMAL
LAB AP REPORT FOOTNOTES: NORMAL

## 2025-09-05 ENCOUNTER — TELEPHONE (OUTPATIENT)
Dept: OBSTETRICS AND GYNECOLOGY | Facility: CLINIC | Age: 40
End: 2025-09-05
Payer: COMMERCIAL

## 2025-09-05 DIAGNOSIS — R79.0 LOW FERRITIN: Primary | ICD-10-CM

## 2025-09-05 RX ORDER — FERROUS SULFATE 325(65) MG
325 TABLET, DELAYED RELEASE (ENTERIC COATED) ORAL 2 TIMES DAILY
Qty: 60 TABLET | Refills: 3 | Status: SHIPPED | OUTPATIENT
Start: 2025-09-05